# Patient Record
Sex: FEMALE | Race: WHITE | HISPANIC OR LATINO | Employment: OTHER | URBAN - NONMETROPOLITAN AREA
[De-identification: names, ages, dates, MRNs, and addresses within clinical notes are randomized per-mention and may not be internally consistent; named-entity substitution may affect disease eponyms.]

---

## 2022-07-28 ENCOUNTER — APPOINTMENT (OUTPATIENT)
Dept: GENERAL RADIOLOGY | Facility: HOSPITAL | Age: 82
End: 2022-07-28

## 2022-07-28 ENCOUNTER — HOSPITAL ENCOUNTER (EMERGENCY)
Facility: HOSPITAL | Age: 82
Discharge: HOME | End: 2022-07-28
Attending: EMERGENCY MEDICINE | Admitting: EMERGENCY MEDICINE

## 2022-07-28 ENCOUNTER — APPOINTMENT (OUTPATIENT)
Dept: CT IMAGING | Facility: HOSPITAL | Age: 82
End: 2022-07-28

## 2022-07-28 VITALS
SYSTOLIC BLOOD PRESSURE: 109 MMHG | TEMPERATURE: 98.3 F | HEIGHT: 60 IN | OXYGEN SATURATION: 95 % | DIASTOLIC BLOOD PRESSURE: 58 MMHG | HEART RATE: 65 BPM | WEIGHT: 110 LBS | BODY MASS INDEX: 21.6 KG/M2 | RESPIRATION RATE: 16 BRPM

## 2022-07-28 DIAGNOSIS — N39.0 URINARY TRACT INFECTION WITHOUT HEMATURIA, SITE UNSPECIFIED: Primary | ICD-10-CM

## 2022-07-28 LAB
ALBUMIN SERPL-MCNC: 3.3 G/DL (ref 3.5–5.2)
ALBUMIN/GLOB SERPL: 1.2 G/DL
ALP SERPL-CCNC: 59 U/L (ref 39–117)
ALT SERPL W P-5'-P-CCNC: 9 U/L (ref 1–33)
ANION GAP SERPL CALCULATED.3IONS-SCNC: 15.2 MMOL/L (ref 5–15)
AST SERPL-CCNC: 14 U/L (ref 1–32)
BACTERIA UR QL AUTO: ABNORMAL /HPF
BASOPHILS # BLD AUTO: 0.02 10*3/MM3 (ref 0–0.2)
BASOPHILS NFR BLD AUTO: 0.3 % (ref 0–1.5)
BILIRUB SERPL-MCNC: 0.3 MG/DL (ref 0–1.2)
BILIRUB UR QL STRIP: NEGATIVE
BUN SERPL-MCNC: 40 MG/DL (ref 8–23)
BUN/CREAT SERPL: 26.5 (ref 7–25)
CALCIUM SPEC-SCNC: 8.9 MG/DL (ref 8.6–10.5)
CHLORIDE SERPL-SCNC: 107 MMOL/L (ref 98–107)
CLARITY UR: ABNORMAL
CO2 SERPL-SCNC: 19.8 MMOL/L (ref 22–29)
COLOR UR: YELLOW
CREAT SERPL-MCNC: 1.51 MG/DL (ref 0.57–1)
D-LACTATE SERPL-SCNC: 1.9 MMOL/L (ref 0.5–2)
D-LACTATE SERPL-SCNC: 3.4 MMOL/L (ref 0.5–2)
DEPRECATED RDW RBC AUTO: 51.7 FL (ref 37–54)
EGFRCR SERPLBLD CKD-EPI 2021: 34.2 ML/MIN/1.73
EOSINOPHIL # BLD AUTO: 0.23 10*3/MM3 (ref 0–0.4)
EOSINOPHIL NFR BLD AUTO: 3.3 % (ref 0.3–6.2)
ERYTHROCYTE [DISTWIDTH] IN BLOOD BY AUTOMATED COUNT: 15.2 % (ref 12.3–15.4)
FLUAV RNA RESP QL NAA+PROBE: NOT DETECTED
FLUBV RNA RESP QL NAA+PROBE: NOT DETECTED
GLOBULIN UR ELPH-MCNC: 2.7 GM/DL
GLUCOSE BLDC GLUCOMTR-MCNC: 134 MG/DL (ref 70–130)
GLUCOSE SERPL-MCNC: 125 MG/DL (ref 65–99)
GLUCOSE UR STRIP-MCNC: NEGATIVE MG/DL
HCT VFR BLD AUTO: 30.5 % (ref 34–46.6)
HGB BLD-MCNC: 9.6 G/DL (ref 12–15.9)
HGB UR QL STRIP.AUTO: NEGATIVE
HOLD SPECIMEN: NORMAL
HOLD SPECIMEN: NORMAL
HYALINE CASTS UR QL AUTO: ABNORMAL /LPF
IMM GRANULOCYTES # BLD AUTO: 0.03 10*3/MM3 (ref 0–0.05)
IMM GRANULOCYTES NFR BLD AUTO: 0.4 % (ref 0–0.5)
KETONES UR QL STRIP: NEGATIVE
LEUKOCYTE ESTERASE UR QL STRIP.AUTO: ABNORMAL
LYMPHOCYTES # BLD AUTO: 1.23 10*3/MM3 (ref 0.7–3.1)
LYMPHOCYTES NFR BLD AUTO: 17.6 % (ref 19.6–45.3)
MAGNESIUM SERPL-MCNC: 1.8 MG/DL (ref 1.6–2.4)
MCH RBC QN AUTO: 29.1 PG (ref 26.6–33)
MCHC RBC AUTO-ENTMCNC: 31.5 G/DL (ref 31.5–35.7)
MCV RBC AUTO: 92.4 FL (ref 79–97)
MONOCYTES # BLD AUTO: 0.37 10*3/MM3 (ref 0.1–0.9)
MONOCYTES NFR BLD AUTO: 5.3 % (ref 5–12)
NEUTROPHILS NFR BLD AUTO: 5.1 10*3/MM3 (ref 1.7–7)
NEUTROPHILS NFR BLD AUTO: 73.1 % (ref 42.7–76)
NITRITE UR QL STRIP: NEGATIVE
NRBC BLD AUTO-RTO: 0 /100 WBC (ref 0–0.2)
PH UR STRIP.AUTO: 6 [PH] (ref 5–8)
PLATELET # BLD AUTO: 197 10*3/MM3 (ref 140–450)
PMV BLD AUTO: 10.6 FL (ref 6–12)
POTASSIUM SERPL-SCNC: 3.2 MMOL/L (ref 3.5–5.2)
PROCALCITONIN SERPL-MCNC: 0.06 NG/ML (ref 0–0.25)
PROT SERPL-MCNC: 6 G/DL (ref 6–8.5)
PROT UR QL STRIP: NEGATIVE
RBC # BLD AUTO: 3.3 10*6/MM3 (ref 3.77–5.28)
RBC # UR STRIP: ABNORMAL /HPF
REF LAB TEST METHOD: ABNORMAL
SARS-COV-2 RNA RESP QL NAA+PROBE: NOT DETECTED
SODIUM SERPL-SCNC: 142 MMOL/L (ref 136–145)
SP GR UR STRIP: 1.01 (ref 1–1.03)
SQUAMOUS #/AREA URNS HPF: ABNORMAL /HPF
TROPONIN T SERPL-MCNC: 0.02 NG/ML (ref 0–0.03)
UROBILINOGEN UR QL STRIP: ABNORMAL
WBC # UR STRIP: ABNORMAL /HPF
WBC NRBC COR # BLD: 6.98 10*3/MM3 (ref 3.4–10.8)
WHOLE BLOOD HOLD COAG: NORMAL
WHOLE BLOOD HOLD SPECIMEN: NORMAL

## 2022-07-28 PROCEDURE — 87186 SC STD MICRODIL/AGAR DIL: CPT | Performed by: EMERGENCY MEDICINE

## 2022-07-28 PROCEDURE — 85025 COMPLETE CBC W/AUTO DIFF WBC: CPT | Performed by: EMERGENCY MEDICINE

## 2022-07-28 PROCEDURE — 83735 ASSAY OF MAGNESIUM: CPT | Performed by: EMERGENCY MEDICINE

## 2022-07-28 PROCEDURE — 80053 COMPREHEN METABOLIC PANEL: CPT | Performed by: EMERGENCY MEDICINE

## 2022-07-28 PROCEDURE — 70450 CT HEAD/BRAIN W/O DYE: CPT

## 2022-07-28 PROCEDURE — C9803 HOPD COVID-19 SPEC COLLECT: HCPCS | Performed by: PHYSICIAN ASSISTANT

## 2022-07-28 PROCEDURE — 84484 ASSAY OF TROPONIN QUANT: CPT | Performed by: EMERGENCY MEDICINE

## 2022-07-28 PROCEDURE — 83605 ASSAY OF LACTIC ACID: CPT | Performed by: PHYSICIAN ASSISTANT

## 2022-07-28 PROCEDURE — 99285 EMERGENCY DEPT VISIT HI MDM: CPT

## 2022-07-28 PROCEDURE — 84145 PROCALCITONIN (PCT): CPT | Performed by: PHYSICIAN ASSISTANT

## 2022-07-28 PROCEDURE — 87077 CULTURE AEROBIC IDENTIFY: CPT | Performed by: EMERGENCY MEDICINE

## 2022-07-28 PROCEDURE — 87086 URINE CULTURE/COLONY COUNT: CPT | Performed by: EMERGENCY MEDICINE

## 2022-07-28 PROCEDURE — 82962 GLUCOSE BLOOD TEST: CPT

## 2022-07-28 PROCEDURE — 81001 URINALYSIS AUTO W/SCOPE: CPT | Performed by: EMERGENCY MEDICINE

## 2022-07-28 PROCEDURE — 25010000002 CEFTRIAXONE SODIUM-DEXTROSE 1-3.74 GM-%(50ML) RECONSTITUTED SOLUTION: Performed by: PHYSICIAN ASSISTANT

## 2022-07-28 PROCEDURE — 93005 ELECTROCARDIOGRAM TRACING: CPT

## 2022-07-28 PROCEDURE — 71045 X-RAY EXAM CHEST 1 VIEW: CPT

## 2022-07-28 PROCEDURE — 96374 THER/PROPH/DIAG INJ IV PUSH: CPT

## 2022-07-28 PROCEDURE — 87636 SARSCOV2 & INF A&B AMP PRB: CPT | Performed by: PHYSICIAN ASSISTANT

## 2022-07-28 RX ORDER — AMLODIPINE BESYLATE 2.5 MG/1
2.5 TABLET ORAL DAILY
COMMUNITY

## 2022-07-28 RX ORDER — CEFTRIAXONE 1 G/50ML
1 INJECTION, SOLUTION INTRAVENOUS ONCE
Status: COMPLETED | OUTPATIENT
Start: 2022-07-28 | End: 2022-07-28

## 2022-07-28 RX ORDER — VALSARTAN AND HYDROCHLOROTHIAZIDE 320; 25 MG/1; MG/1
1 TABLET, FILM COATED ORAL DAILY
COMMUNITY

## 2022-07-28 RX ORDER — ONDANSETRON 4 MG/1
4 TABLET, ORALLY DISINTEGRATING ORAL EVERY 6 HOURS PRN
Qty: 20 TABLET | Refills: 0 | Status: SHIPPED | OUTPATIENT
Start: 2022-07-28

## 2022-07-28 RX ORDER — CEPHALEXIN 500 MG/1
500 CAPSULE ORAL 3 TIMES DAILY
Qty: 21 CAPSULE | Refills: 0 | Status: SHIPPED | OUTPATIENT
Start: 2022-07-28 | End: 2022-08-04

## 2022-07-28 RX ORDER — SODIUM CHLORIDE 0.9 % (FLUSH) 0.9 %
10 SYRINGE (ML) INJECTION AS NEEDED
Status: DISCONTINUED | OUTPATIENT
Start: 2022-07-28 | End: 2022-07-28 | Stop reason: HOSPADM

## 2022-07-28 RX ORDER — QUETIAPINE FUMARATE 25 MG/1
25 TABLET, FILM COATED ORAL
COMMUNITY

## 2022-07-28 RX ORDER — ATORVASTATIN CALCIUM 40 MG/1
40 TABLET, FILM COATED ORAL DAILY
COMMUNITY

## 2022-07-28 RX ORDER — ASPIRIN 81 MG/1
81 TABLET, CHEWABLE ORAL DAILY
COMMUNITY

## 2022-07-28 RX ADMIN — SODIUM CHLORIDE 1000 ML: 9 INJECTION, SOLUTION INTRAVENOUS at 13:30

## 2022-07-28 RX ADMIN — CEFTRIAXONE 1 G: 1 INJECTION, SOLUTION INTRAVENOUS at 18:07

## 2022-07-30 LAB — BACTERIA SPEC AEROBE CULT: ABNORMAL

## 2022-09-29 ENCOUNTER — HOSPITAL ENCOUNTER (EMERGENCY)
Facility: HOSPITAL | Age: 82
Discharge: HOME | End: 2022-09-29
Attending: FAMILY MEDICINE | Admitting: FAMILY MEDICINE

## 2022-09-29 ENCOUNTER — APPOINTMENT (OUTPATIENT)
Dept: GENERAL RADIOLOGY | Facility: HOSPITAL | Age: 82
End: 2022-09-29

## 2022-09-29 VITALS
HEART RATE: 102 BPM | DIASTOLIC BLOOD PRESSURE: 97 MMHG | RESPIRATION RATE: 20 BRPM | WEIGHT: 90 LBS | OXYGEN SATURATION: 94 % | SYSTOLIC BLOOD PRESSURE: 152 MMHG | TEMPERATURE: 99.4 F

## 2022-09-29 DIAGNOSIS — U07.1 COVID-19 VIRUS INFECTION: Primary | ICD-10-CM

## 2022-09-29 LAB
B PARAPERT DNA SPEC QL NAA+PROBE: NOT DETECTED
B PERT DNA SPEC QL NAA+PROBE: NOT DETECTED
C PNEUM DNA NPH QL NAA+NON-PROBE: NOT DETECTED
FLUAV SUBTYP SPEC NAA+PROBE: NOT DETECTED
FLUBV RNA ISLT QL NAA+PROBE: NOT DETECTED
HADV DNA SPEC NAA+PROBE: NOT DETECTED
HCOV 229E RNA SPEC QL NAA+PROBE: NOT DETECTED
HCOV HKU1 RNA SPEC QL NAA+PROBE: NOT DETECTED
HCOV NL63 RNA SPEC QL NAA+PROBE: NOT DETECTED
HCOV OC43 RNA SPEC QL NAA+PROBE: NOT DETECTED
HMPV RNA NPH QL NAA+NON-PROBE: NOT DETECTED
HPIV1 RNA ISLT QL NAA+PROBE: NOT DETECTED
HPIV2 RNA SPEC QL NAA+PROBE: NOT DETECTED
HPIV3 RNA NPH QL NAA+PROBE: NOT DETECTED
HPIV4 P GENE NPH QL NAA+PROBE: NOT DETECTED
M PNEUMO IGG SER IA-ACNC: NOT DETECTED
RHINOVIRUS RNA SPEC NAA+PROBE: NOT DETECTED
RSV RNA NPH QL NAA+NON-PROBE: NOT DETECTED
SARS-COV-2 RNA NPH QL NAA+NON-PROBE: DETECTED

## 2022-09-29 PROCEDURE — 71045 X-RAY EXAM CHEST 1 VIEW: CPT

## 2022-09-29 PROCEDURE — 99283 EMERGENCY DEPT VISIT LOW MDM: CPT

## 2022-09-29 PROCEDURE — 0202U NFCT DS 22 TRGT SARS-COV-2: CPT | Performed by: FAMILY MEDICINE

## 2022-09-29 RX ORDER — ACETAMINOPHEN 500 MG
500 TABLET ORAL ONCE
Status: COMPLETED | OUTPATIENT
Start: 2022-09-29 | End: 2022-09-29

## 2022-09-29 RX ORDER — IBUPROFEN 200 MG
400 TABLET ORAL ONCE
Status: COMPLETED | OUTPATIENT
Start: 2022-09-29 | End: 2022-09-29

## 2022-09-29 RX ORDER — VALSARTAN AND HYDROCHLOROTHIAZIDE 160; 12.5 MG/1; MG/1
1 TABLET, FILM COATED ORAL DAILY
COMMUNITY

## 2022-09-29 RX ORDER — AMLODIPINE BESYLATE AND BENAZEPRIL HYDROCHLORIDE 2.5; 1 MG/1; MG/1
1 CAPSULE ORAL DAILY
COMMUNITY

## 2022-09-29 RX ORDER — ACETAMINOPHEN 325 MG/1
650 TABLET ORAL EVERY 6 HOURS PRN
Status: DISCONTINUED | OUTPATIENT
Start: 2022-09-29 | End: 2022-09-30 | Stop reason: HOSPADM

## 2022-09-29 RX ORDER — ATORVASTATIN CALCIUM 40 MG/1
40 TABLET, FILM COATED ORAL DAILY
COMMUNITY

## 2022-09-29 RX ORDER — QUETIAPINE FUMARATE 25 MG/1
25 TABLET, FILM COATED ORAL NIGHTLY
COMMUNITY

## 2022-09-29 RX ORDER — IBUPROFEN 600 MG/1
600 TABLET ORAL 3 TIMES DAILY
Qty: 30 TABLET | Refills: 0 | Status: SHIPPED | OUTPATIENT
Start: 2022-09-29

## 2022-09-29 RX ORDER — ACETAMINOPHEN 500 MG
500 TABLET ORAL EVERY 6 HOURS PRN
Qty: 30 TABLET | Refills: 0 | Status: SHIPPED | OUTPATIENT
Start: 2022-09-29

## 2022-09-29 RX ADMIN — ACETAMINOPHEN 500 MG: 500 TABLET, FILM COATED ORAL at 21:57

## 2022-09-29 RX ADMIN — ACETAMINOPHEN 650 MG: 325 TABLET, FILM COATED ORAL at 20:01

## 2022-09-29 RX ADMIN — IBUPROFEN 400 MG: 200 TABLET, FILM COATED ORAL at 21:57

## 2024-01-01 ENCOUNTER — APPOINTMENT (OUTPATIENT)
Dept: CT IMAGING | Facility: HOSPITAL | Age: 84
DRG: 207 | End: 2024-01-01
Payer: COMMERCIAL

## 2024-01-01 ENCOUNTER — APPOINTMENT (OUTPATIENT)
Dept: GENERAL RADIOLOGY | Facility: HOSPITAL | Age: 84
DRG: 207 | End: 2024-01-01
Payer: COMMERCIAL

## 2024-01-01 ENCOUNTER — HOSPITAL ENCOUNTER (INPATIENT)
Facility: HOSPITAL | Age: 84
LOS: 8 days | DRG: 207 | End: 2024-10-02
Attending: EMERGENCY MEDICINE | Admitting: INTERNAL MEDICINE
Payer: COMMERCIAL

## 2024-01-01 ENCOUNTER — APPOINTMENT (OUTPATIENT)
Dept: SLEEP MEDICINE | Facility: HOSPITAL | Age: 84
DRG: 207 | End: 2024-01-01
Payer: COMMERCIAL

## 2024-01-01 VITALS
OXYGEN SATURATION: 61 % | DIASTOLIC BLOOD PRESSURE: 73 MMHG | HEIGHT: 55 IN | WEIGHT: 112.43 LBS | BODY MASS INDEX: 26.02 KG/M2 | SYSTOLIC BLOOD PRESSURE: 152 MMHG | RESPIRATION RATE: 30 BRPM | TEMPERATURE: 100.8 F

## 2024-01-01 DIAGNOSIS — R09.2 RESPIRATORY ARREST: Primary | ICD-10-CM

## 2024-01-01 DIAGNOSIS — T17.308A CHOKING, INITIAL ENCOUNTER: ICD-10-CM

## 2024-01-01 LAB
A-A DO2: 198 MMHG
A-A DO2: 499.2 MMHG
A-A DO2: 52.8 MMHG
A-A DO2: 82.7 MMHG
ABO GROUP BLD: NORMAL
ABO GROUP BLD: NORMAL
ACANTHOCYTES BLD QL SMEAR: ABNORMAL
ALBUMIN SERPL-MCNC: 2.4 G/DL (ref 3.5–5.2)
ALBUMIN SERPL-MCNC: 2.6 G/DL (ref 3.5–5.2)
ALBUMIN SERPL-MCNC: 3 G/DL (ref 3.5–5.2)
ALBUMIN SERPL-MCNC: 3.6 G/DL (ref 3.5–5.2)
ALBUMIN SERPL-MCNC: 3.6 G/DL (ref 3.5–5.2)
ALBUMIN/GLOB SERPL: 0.9 G/DL
ALBUMIN/GLOB SERPL: 1 G/DL
ALBUMIN/GLOB SERPL: 1 G/DL
ALBUMIN/GLOB SERPL: 1.1 G/DL
ALBUMIN/GLOB SERPL: 1.2 G/DL
ALP SERPL-CCNC: 115 U/L (ref 39–117)
ALP SERPL-CCNC: 135 U/L (ref 39–117)
ALP SERPL-CCNC: 155 U/L (ref 39–117)
ALP SERPL-CCNC: 231 U/L (ref 39–117)
ALP SERPL-CCNC: 86 U/L (ref 39–117)
ALT SERPL W P-5'-P-CCNC: 150 U/L (ref 1–33)
ALT SERPL W P-5'-P-CCNC: 152 U/L (ref 1–33)
ALT SERPL W P-5'-P-CCNC: 235 U/L (ref 1–33)
ALT SERPL W P-5'-P-CCNC: 71 U/L (ref 1–33)
ALT SERPL W P-5'-P-CCNC: 94 U/L (ref 1–33)
AMORPH URATE CRY URNS QL MICRO: ABNORMAL /HPF
ANION GAP SERPL CALCULATED.3IONS-SCNC: 10.5 MMOL/L (ref 5–15)
ANION GAP SERPL CALCULATED.3IONS-SCNC: 10.9 MMOL/L (ref 5–15)
ANION GAP SERPL CALCULATED.3IONS-SCNC: 11.7 MMOL/L (ref 5–15)
ANION GAP SERPL CALCULATED.3IONS-SCNC: 15.2 MMOL/L (ref 5–15)
ANION GAP SERPL CALCULATED.3IONS-SCNC: 25.2 MMOL/L (ref 5–15)
ANION GAP SERPL CALCULATED.3IONS-SCNC: 6.6 MMOL/L (ref 5–15)
APTT PPP: 38.3 SECONDS (ref 23–35)
ARTERIAL PATENCY WRIST A: ABNORMAL
ARTERIAL PATENCY WRIST A: POSITIVE
AST SERPL-CCNC: 132 U/L (ref 1–32)
AST SERPL-CCNC: 179 U/L (ref 1–32)
AST SERPL-CCNC: 197 U/L (ref 1–32)
AST SERPL-CCNC: 202 U/L (ref 1–32)
AST SERPL-CCNC: 339 U/L (ref 1–32)
ATMOSPHERIC PRESS: 723 MMHG
ATMOSPHERIC PRESS: 729 MMHG
ATMOSPHERIC PRESS: 730 MMHG
ATMOSPHERIC PRESS: 730 MMHG
BACTERIA SPEC AEROBE CULT: ABNORMAL
BACTERIA SPEC AEROBE CULT: NORMAL
BACTERIA SPEC AEROBE CULT: NORMAL
BACTERIA UR QL AUTO: ABNORMAL /HPF
BASE EXCESS BLDA CALC-SCNC: -25.3 MMOL/L (ref 0–2)
BASE EXCESS BLDA CALC-SCNC: -6.1 MMOL/L (ref 0–2)
BASE EXCESS BLDA CALC-SCNC: 0.3 MMOL/L (ref 0–2)
BASE EXCESS BLDA CALC-SCNC: 8.2 MMOL/L (ref 0–2)
BASOPHILS # BLD AUTO: 0.02 10*3/MM3 (ref 0–0.2)
BASOPHILS # BLD AUTO: 0.03 10*3/MM3 (ref 0–0.2)
BASOPHILS NFR BLD AUTO: 0.1 % (ref 0–1.5)
BASOPHILS NFR BLD AUTO: 0.3 % (ref 0–1.5)
BDY SITE: ABNORMAL
BH BB BLOOD EXPIRATION DATE: NORMAL
BH BB BLOOD TYPE BARCODE: 6200
BH BB DISPENSE STATUS: NORMAL
BH BB PRODUCT CODE: NORMAL
BH BB UNIT NUMBER: NORMAL
BILIRUB CONJ SERPL-MCNC: <0.2 MG/DL (ref 0–0.3)
BILIRUB SERPL-MCNC: 0.2 MG/DL (ref 0–1.2)
BILIRUB SERPL-MCNC: 0.3 MG/DL (ref 0–1.2)
BILIRUB SERPL-MCNC: 0.3 MG/DL (ref 0–1.2)
BILIRUB SERPL-MCNC: 0.4 MG/DL (ref 0–1.2)
BILIRUB SERPL-MCNC: 0.4 MG/DL (ref 0–1.2)
BILIRUB UR QL STRIP: NEGATIVE
BLD GP AB SCN SERPL QL: NEGATIVE
BUN SERPL-MCNC: 12 MG/DL (ref 8–23)
BUN SERPL-MCNC: 16 MG/DL (ref 8–23)
BUN SERPL-MCNC: 23 MG/DL (ref 8–23)
BUN SERPL-MCNC: 28 MG/DL (ref 8–23)
BUN SERPL-MCNC: 30 MG/DL (ref 8–23)
BUN SERPL-MCNC: 39 MG/DL (ref 8–23)
BUN/CREAT SERPL: 15 (ref 7–25)
BUN/CREAT SERPL: 16.5 (ref 7–25)
BUN/CREAT SERPL: 19.2 (ref 7–25)
BUN/CREAT SERPL: 19.8 (ref 7–25)
BUN/CREAT SERPL: 22.6 (ref 7–25)
BUN/CREAT SERPL: 31.7 (ref 7–25)
BURR CELLS BLD QL SMEAR: ABNORMAL
CALCIUM SPEC-SCNC: 7.3 MG/DL (ref 8.6–10.5)
CALCIUM SPEC-SCNC: 7.4 MG/DL (ref 8.6–10.5)
CALCIUM SPEC-SCNC: 7.7 MG/DL (ref 8.6–10.5)
CALCIUM SPEC-SCNC: 8 MG/DL (ref 8.6–10.5)
CALCIUM SPEC-SCNC: 9.4 MG/DL (ref 8.6–10.5)
CALCIUM SPEC-SCNC: 9.6 MG/DL (ref 8.6–10.5)
CHLORIDE SERPL-SCNC: 101 MMOL/L (ref 98–107)
CHLORIDE SERPL-SCNC: 103 MMOL/L (ref 98–107)
CHLORIDE SERPL-SCNC: 104 MMOL/L (ref 98–107)
CHLORIDE SERPL-SCNC: 104 MMOL/L (ref 98–107)
CHLORIDE SERPL-SCNC: 98 MMOL/L (ref 98–107)
CHLORIDE SERPL-SCNC: 98 MMOL/L (ref 98–107)
CLARITY UR: ABNORMAL
CO2 SERPL-SCNC: 13.8 MMOL/L (ref 22–29)
CO2 SERPL-SCNC: 20.5 MMOL/L (ref 22–29)
CO2 SERPL-SCNC: 22.4 MMOL/L (ref 22–29)
CO2 SERPL-SCNC: 24.1 MMOL/L (ref 22–29)
CO2 SERPL-SCNC: 30.3 MMOL/L (ref 22–29)
CO2 SERPL-SCNC: 30.8 MMOL/L (ref 22–29)
COHGB MFR BLD: 0.3 % (ref 0–2)
COHGB MFR BLD: 0.6 % (ref 0–2)
COHGB MFR BLD: 0.8 % (ref 0–2)
COHGB MFR BLD: <0 % (ref 0–2)
COLOR UR: YELLOW
CREAT SERPL-MCNC: 0.8 MG/DL (ref 0.57–1)
CREAT SERPL-MCNC: 0.97 MG/DL (ref 0.57–1)
CREAT SERPL-MCNC: 1.16 MG/DL (ref 0.57–1)
CREAT SERPL-MCNC: 1.23 MG/DL (ref 0.57–1)
CREAT SERPL-MCNC: 1.33 MG/DL (ref 0.57–1)
CREAT SERPL-MCNC: 1.46 MG/DL (ref 0.57–1)
CROSSMATCH INTERPRETATION: NORMAL
CRP SERPL-MCNC: 0.45 MG/DL (ref 0–0.5)
D-LACTATE SERPL-SCNC: 14.1 MMOL/L (ref 0.5–2)
D-LACTATE SERPL-SCNC: 3.1 MMOL/L (ref 0.5–2)
D-LACTATE SERPL-SCNC: 4.8 MMOL/L (ref 0.5–2)
D-LACTATE SERPL-SCNC: 8.9 MMOL/L (ref 0.5–2)
DEPRECATED RDW RBC AUTO: 49.2 FL (ref 37–54)
DEPRECATED RDW RBC AUTO: 53.1 FL (ref 37–54)
DEPRECATED RDW RBC AUTO: 53.2 FL (ref 37–54)
DEPRECATED RDW RBC AUTO: 54.8 FL (ref 37–54)
DEPRECATED RDW RBC AUTO: 55.5 FL (ref 37–54)
DEPRECATED RDW RBC AUTO: 62.4 FL (ref 37–54)
EGFRCR SERPLBLD CKD-EPI 2021: 34.9 ML/MIN/1.73
EGFRCR SERPLBLD CKD-EPI 2021: 39 ML/MIN/1.73
EGFRCR SERPLBLD CKD-EPI 2021: 42.9 ML/MIN/1.73
EGFRCR SERPLBLD CKD-EPI 2021: 46 ML/MIN/1.73
EGFRCR SERPLBLD CKD-EPI 2021: 57 ML/MIN/1.73
EGFRCR SERPLBLD CKD-EPI 2021: 71.9 ML/MIN/1.73
EOSINOPHIL # BLD AUTO: 0.03 10*3/MM3 (ref 0–0.4)
EOSINOPHIL # BLD AUTO: 0.05 10*3/MM3 (ref 0–0.4)
EOSINOPHIL # BLD MANUAL: 0.62 10*3/MM3 (ref 0–0.4)
EOSINOPHIL NFR BLD AUTO: 0.2 % (ref 0.3–6.2)
EOSINOPHIL NFR BLD AUTO: 0.5 % (ref 0.3–6.2)
EOSINOPHIL NFR BLD MANUAL: 5 % (ref 0.3–6.2)
ERYTHROCYTE [DISTWIDTH] IN BLOOD BY AUTOMATED COUNT: 16.4 % (ref 12.3–15.4)
ERYTHROCYTE [DISTWIDTH] IN BLOOD BY AUTOMATED COUNT: 17 % (ref 12.3–15.4)
ERYTHROCYTE [DISTWIDTH] IN BLOOD BY AUTOMATED COUNT: 17.5 % (ref 12.3–15.4)
ERYTHROCYTE [DISTWIDTH] IN BLOOD BY AUTOMATED COUNT: 17.6 % (ref 12.3–15.4)
ERYTHROCYTE [DISTWIDTH] IN BLOOD BY AUTOMATED COUNT: 17.9 % (ref 12.3–15.4)
ERYTHROCYTE [DISTWIDTH] IN BLOOD BY AUTOMATED COUNT: 18.2 % (ref 12.3–15.4)
GLOBULIN UR ELPH-MCNC: 2.3 GM/DL
GLOBULIN UR ELPH-MCNC: 2.8 GM/DL
GLOBULIN UR ELPH-MCNC: 2.9 GM/DL
GLOBULIN UR ELPH-MCNC: 3.1 GM/DL
GLOBULIN UR ELPH-MCNC: 3.4 GM/DL
GLUCOSE BLDC GLUCOMTR-MCNC: 112 MG/DL (ref 70–130)
GLUCOSE BLDC GLUCOMTR-MCNC: 134 MG/DL (ref 70–130)
GLUCOSE BLDC GLUCOMTR-MCNC: 160 MG/DL (ref 70–130)
GLUCOSE BLDC GLUCOMTR-MCNC: 164 MG/DL (ref 70–130)
GLUCOSE BLDC GLUCOMTR-MCNC: 164 MG/DL (ref 70–130)
GLUCOSE BLDC GLUCOMTR-MCNC: 166 MG/DL (ref 70–130)
GLUCOSE BLDC GLUCOMTR-MCNC: 166 MG/DL (ref 70–130)
GLUCOSE BLDC GLUCOMTR-MCNC: 186 MG/DL (ref 70–130)
GLUCOSE BLDC GLUCOMTR-MCNC: 190 MG/DL (ref 70–130)
GLUCOSE BLDC GLUCOMTR-MCNC: 211 MG/DL (ref 70–130)
GLUCOSE BLDC GLUCOMTR-MCNC: 213 MG/DL (ref 70–130)
GLUCOSE BLDC GLUCOMTR-MCNC: 243 MG/DL (ref 70–130)
GLUCOSE SERPL-MCNC: 155 MG/DL (ref 65–99)
GLUCOSE SERPL-MCNC: 158 MG/DL (ref 65–99)
GLUCOSE SERPL-MCNC: 182 MG/DL (ref 65–99)
GLUCOSE SERPL-MCNC: 190 MG/DL (ref 65–99)
GLUCOSE SERPL-MCNC: 215 MG/DL (ref 65–99)
GLUCOSE SERPL-MCNC: 240 MG/DL (ref 65–99)
GLUCOSE UR STRIP-MCNC: NEGATIVE MG/DL
HCO3 BLDA-SCNC: 18.6 MMOL/L (ref 22–28)
HCO3 BLDA-SCNC: 24.5 MMOL/L (ref 22–28)
HCO3 BLDA-SCNC: 31.8 MMOL/L (ref 22–28)
HCO3 BLDA-SCNC: 9.6 MMOL/L (ref 22–28)
HCT VFR BLD AUTO: 21.5 % (ref 34–46.6)
HCT VFR BLD AUTO: 23 % (ref 34–46.6)
HCT VFR BLD AUTO: 24.7 % (ref 34–46.6)
HCT VFR BLD AUTO: 26.9 % (ref 34–46.6)
HCT VFR BLD AUTO: 29.5 % (ref 34–46.6)
HCT VFR BLD AUTO: 33.3 % (ref 34–46.6)
HCT VFR BLD CALC: 21.4 %
HCT VFR BLD CALC: 24.7 %
HCT VFR BLD CALC: 29.6 %
HCT VFR BLD CALC: 30 %
HGB BLD-MCNC: 6.8 G/DL (ref 12–15.9)
HGB BLD-MCNC: 7.2 G/DL (ref 12–15.9)
HGB BLD-MCNC: 7.8 G/DL (ref 12–15.9)
HGB BLD-MCNC: 8.7 G/DL (ref 12–15.9)
HGB BLD-MCNC: 9.5 G/DL (ref 12–15.9)
HGB BLD-MCNC: 9.6 G/DL (ref 12–15.9)
HGB UR QL STRIP.AUTO: ABNORMAL
HOLD SPECIMEN: NORMAL
HOLD SPECIMEN: NORMAL
HYALINE CASTS UR QL AUTO: ABNORMAL /LPF
HYPOCHROMIA BLD QL: ABNORMAL
IMM GRANULOCYTES # BLD AUTO: 0.07 10*3/MM3 (ref 0–0.05)
IMM GRANULOCYTES # BLD AUTO: 0.07 10*3/MM3 (ref 0–0.05)
IMM GRANULOCYTES NFR BLD AUTO: 0.5 % (ref 0–0.5)
IMM GRANULOCYTES NFR BLD AUTO: 0.6 % (ref 0–0.5)
INHALED O2 CONCENTRATION: 100 %
INHALED O2 CONCENTRATION: 100 %
INHALED O2 CONCENTRATION: 30 %
INHALED O2 CONCENTRATION: 35 %
INR PPP: 1.32 (ref 0.9–1.1)
KETONES UR QL STRIP: NEGATIVE
LEUKOCYTE ESTERASE UR QL STRIP.AUTO: ABNORMAL
LYMPHOCYTES # BLD AUTO: 1.57 10*3/MM3 (ref 0.7–3.1)
LYMPHOCYTES # BLD AUTO: 1.69 10*3/MM3 (ref 0.7–3.1)
LYMPHOCYTES # BLD MANUAL: 6.77 10*3/MM3 (ref 0.7–3.1)
LYMPHOCYTES NFR BLD AUTO: 11.1 % (ref 19.6–45.3)
LYMPHOCYTES NFR BLD AUTO: 14.5 % (ref 19.6–45.3)
LYMPHOCYTES NFR BLD MANUAL: 6 % (ref 5–12)
Lab: ABNORMAL
MAGNESIUM SERPL-MCNC: 1.7 MG/DL (ref 1.6–2.4)
MAGNESIUM SERPL-MCNC: 2.2 MG/DL (ref 1.6–2.4)
MAGNESIUM SERPL-MCNC: 3 MG/DL (ref 1.6–2.4)
MCH RBC QN AUTO: 26.4 PG (ref 26.6–33)
MCH RBC QN AUTO: 26.5 PG (ref 26.6–33)
MCH RBC QN AUTO: 26.6 PG (ref 26.6–33)
MCH RBC QN AUTO: 27.2 PG (ref 26.6–33)
MCHC RBC AUTO-ENTMCNC: 28.8 G/DL (ref 31.5–35.7)
MCHC RBC AUTO-ENTMCNC: 31.3 G/DL (ref 31.5–35.7)
MCHC RBC AUTO-ENTMCNC: 31.6 G/DL (ref 31.5–35.7)
MCHC RBC AUTO-ENTMCNC: 31.6 G/DL (ref 31.5–35.7)
MCHC RBC AUTO-ENTMCNC: 32.2 G/DL (ref 31.5–35.7)
MCHC RBC AUTO-ENTMCNC: 32.3 G/DL (ref 31.5–35.7)
MCV RBC AUTO: 81.5 FL (ref 79–97)
MCV RBC AUTO: 82.6 FL (ref 79–97)
MCV RBC AUTO: 84 FL (ref 79–97)
MCV RBC AUTO: 84 FL (ref 79–97)
MCV RBC AUTO: 84.9 FL (ref 79–97)
MCV RBC AUTO: 94.3 FL (ref 79–97)
METHGB BLD QL: 0.8 % (ref 0–1.5)
METHGB BLD QL: 0.9 % (ref 0–1.5)
METHGB BLD QL: 1.2 % (ref 0–1.5)
METHGB BLD QL: 1.2 % (ref 0–1.5)
MODALITY: ABNORMAL
MONOCYTES # BLD AUTO: 0.45 10*3/MM3 (ref 0.1–0.9)
MONOCYTES # BLD AUTO: 0.71 10*3/MM3 (ref 0.1–0.9)
MONOCYTES # BLD: 0.74 10*3/MM3 (ref 0.1–0.9)
MONOCYTES NFR BLD AUTO: 4.2 % (ref 5–12)
MONOCYTES NFR BLD AUTO: 4.7 % (ref 5–12)
MRSA DNA SPEC QL NAA+PROBE: NORMAL
NEUTROPHILS # BLD AUTO: 4.19 10*3/MM3 (ref 1.7–7)
NEUTROPHILS NFR BLD AUTO: 12.74 10*3/MM3 (ref 1.7–7)
NEUTROPHILS NFR BLD AUTO: 79.9 % (ref 42.7–76)
NEUTROPHILS NFR BLD AUTO: 8.65 10*3/MM3 (ref 1.7–7)
NEUTROPHILS NFR BLD AUTO: 83.4 % (ref 42.7–76)
NEUTROPHILS NFR BLD MANUAL: 33 % (ref 42.7–76)
NEUTS BAND NFR BLD MANUAL: 1 % (ref 0–5)
NITRITE UR QL STRIP: NEGATIVE
NOTIFIED BY: ABNORMAL
NOTIFIED WHO: ABNORMAL
NRBC BLD AUTO-RTO: 0 /100 WBC (ref 0–0.2)
NRBC BLD AUTO-RTO: 0 /100 WBC (ref 0–0.2)
OXYHGB MFR BLDV: 88.2 % (ref 94–99)
OXYHGB MFR BLDV: 96.6 % (ref 94–99)
OXYHGB MFR BLDV: 97.2 % (ref 94–99)
OXYHGB MFR BLDV: 98.1 % (ref 94–99)
PCO2 BLDA: 32.8 MM HG (ref 35–45)
PCO2 BLDA: 36.8 MM HG (ref 35–45)
PCO2 BLDA: 39.3 MM HG (ref 35–45)
PCO2 BLDA: 70.5 MM HG (ref 35–45)
PCO2 TEMP ADJ BLD: ABNORMAL MM[HG]
PEEP RESPIRATORY: 5 CM[H2O]
PH BLDA: 7.36 PH UNITS (ref 7.3–7.5)
PH BLDA: 7.43 PH UNITS (ref 7.3–7.5)
PH BLDA: 7.52 PH UNITS (ref 7.3–7.5)
PH BLDA: <6.767 PH UNITS (ref 7.3–7.5)
PH UR STRIP.AUTO: 6 [PH] (ref 5–8)
PH, TEMP CORRECTED: ABNORMAL
PHENYTOIN SERPL-MCNC: 10.2 MCG/ML (ref 10–20)
PHENYTOIN SERPL-MCNC: 12.6 MCG/ML (ref 10–20)
PHENYTOIN SERPL-MCNC: 17 MCG/ML (ref 10–20)
PHENYTOIN SERPL-MCNC: 19.4 MCG/ML (ref 10–20)
PHOSPHATE SERPL-MCNC: 1.7 MG/DL (ref 2.5–4.5)
PHOSPHATE SERPL-MCNC: 3.3 MG/DL (ref 2.5–4.5)
PHOSPHATE SERPL-MCNC: 8 MG/DL (ref 2.5–4.5)
PLATELET # BLD AUTO: 148 10*3/MM3 (ref 140–450)
PLATELET # BLD AUTO: 169 10*3/MM3 (ref 140–450)
PLATELET # BLD AUTO: 176 10*3/MM3 (ref 140–450)
PLATELET # BLD AUTO: 243 10*3/MM3 (ref 140–450)
PLATELET # BLD AUTO: 250 10*3/MM3 (ref 140–450)
PLATELET # BLD AUTO: 302 10*3/MM3 (ref 140–450)
PMV BLD AUTO: 10.2 FL (ref 6–12)
PMV BLD AUTO: 10.4 FL (ref 6–12)
PMV BLD AUTO: 10.4 FL (ref 6–12)
PMV BLD AUTO: 10.7 FL (ref 6–12)
PMV BLD AUTO: 10.8 FL (ref 6–12)
PMV BLD AUTO: 11.8 FL (ref 6–12)
PO2 BLDA: 109 MM HG (ref 75–100)
PO2 BLDA: 113 MM HG (ref 75–100)
PO2 BLDA: 113 MM HG (ref 75–100)
PO2 BLDA: 452 MM HG (ref 75–100)
PO2 TEMP ADJ BLD: ABNORMAL MM[HG]
POTASSIUM SERPL-SCNC: 3.1 MMOL/L (ref 3.5–5.2)
POTASSIUM SERPL-SCNC: 3.1 MMOL/L (ref 3.5–5.2)
POTASSIUM SERPL-SCNC: 3.3 MMOL/L (ref 3.5–5.2)
POTASSIUM SERPL-SCNC: 3.4 MMOL/L (ref 3.5–5.2)
POTASSIUM SERPL-SCNC: 3.5 MMOL/L (ref 3.5–5.2)
POTASSIUM SERPL-SCNC: 5.3 MMOL/L (ref 3.5–5.2)
PROT SERPL-MCNC: 4.7 G/DL (ref 6–8.5)
PROT SERPL-MCNC: 5.4 G/DL (ref 6–8.5)
PROT SERPL-MCNC: 5.9 G/DL (ref 6–8.5)
PROT SERPL-MCNC: 6.7 G/DL (ref 6–8.5)
PROT SERPL-MCNC: 7 G/DL (ref 6–8.5)
PROT UR QL STRIP: ABNORMAL
PROTHROMBIN TIME: 16.9 SECONDS (ref 12.3–15.1)
RBC # BLD AUTO: 2.56 10*6/MM3 (ref 3.77–5.28)
RBC # BLD AUTO: 2.71 10*6/MM3 (ref 3.77–5.28)
RBC # BLD AUTO: 2.94 10*6/MM3 (ref 3.77–5.28)
RBC # BLD AUTO: 3.3 10*6/MM3 (ref 3.77–5.28)
RBC # BLD AUTO: 3.53 10*6/MM3 (ref 3.77–5.28)
RBC # BLD AUTO: 3.57 10*6/MM3 (ref 3.77–5.28)
RBC # UR STRIP: ABNORMAL /HPF
REF LAB TEST METHOD: ABNORMAL
RH BLD: POSITIVE
RH BLD: POSITIVE
SAO2 % BLDCOA: 89.6 % (ref 94–100)
SAO2 % BLDCOA: 98.5 % (ref 94–100)
SAO2 % BLDCOA: 98.6 % (ref 94–100)
SAO2 % BLDCOA: 98.7 % (ref 94–100)
SCAN SLIDE: NORMAL
SET MECH RESP RATE: 20
SMALL PLATELETS BLD QL SMEAR: ADEQUATE
SODIUM SERPL-SCNC: 129 MMOL/L (ref 136–145)
SODIUM SERPL-SCNC: 130 MMOL/L (ref 136–145)
SODIUM SERPL-SCNC: 133 MMOL/L (ref 136–145)
SODIUM SERPL-SCNC: 143 MMOL/L (ref 136–145)
SODIUM SERPL-SCNC: 145 MMOL/L (ref 136–145)
SODIUM SERPL-SCNC: 150 MMOL/L (ref 136–145)
SP GR UR STRIP: 1.02 (ref 1–1.03)
SQUAMOUS #/AREA URNS HPF: ABNORMAL /HPF
T&S EXPIRATION DATE: NORMAL
UNIT  ABO: NORMAL
UNIT  RH: NORMAL
UROBILINOGEN UR QL STRIP: ABNORMAL
VANCOMYCIN SERPL-MCNC: 10.9 MCG/ML (ref 5–40)
VANCOMYCIN SERPL-MCNC: 7.2 MCG/ML (ref 5–40)
VARIANT LYMPHS NFR BLD MANUAL: 15 % (ref 0–5)
VARIANT LYMPHS NFR BLD MANUAL: 40 % (ref 19.6–45.3)
VENTILATOR MODE: ABNORMAL
VENTILATOR MODE: AC
VT ON VENT VENT: 320 ML
WBC # UR STRIP: ABNORMAL /HPF
WBC MORPH BLD: NORMAL
WBC NRBC COR # BLD AUTO: 10.82 10*3/MM3 (ref 3.4–10.8)
WBC NRBC COR # BLD AUTO: 12.31 10*3/MM3 (ref 3.4–10.8)
WBC NRBC COR # BLD AUTO: 15.26 10*3/MM3 (ref 3.4–10.8)
WBC NRBC COR # BLD AUTO: 18.45 10*3/MM3 (ref 3.4–10.8)
WBC NRBC COR # BLD AUTO: 6.28 10*3/MM3 (ref 3.4–10.8)
WBC NRBC COR # BLD AUTO: 6.31 10*3/MM3 (ref 3.4–10.8)
WHOLE BLOOD HOLD COAG: NORMAL
WHOLE BLOOD HOLD SPECIMEN: NORMAL

## 2024-01-01 PROCEDURE — 74018 RADEX ABDOMEN 1 VIEW: CPT

## 2024-01-01 PROCEDURE — 83735 ASSAY OF MAGNESIUM: CPT | Performed by: INTERNAL MEDICINE

## 2024-01-01 PROCEDURE — 94761 N-INVAS EAR/PLS OXIMETRY MLT: CPT

## 2024-01-01 PROCEDURE — 94003 VENT MGMT INPAT SUBQ DAY: CPT

## 2024-01-01 PROCEDURE — 83735 ASSAY OF MAGNESIUM: CPT | Performed by: EMERGENCY MEDICINE

## 2024-01-01 PROCEDURE — 25010000002 LORAZEPAM PER 2 MG: Performed by: PHYSICIAN ASSISTANT

## 2024-01-01 PROCEDURE — 82805 BLOOD GASES W/O2 SATURATION: CPT

## 2024-01-01 PROCEDURE — 85027 COMPLETE CBC AUTOMATED: CPT | Performed by: INTERNAL MEDICINE

## 2024-01-01 PROCEDURE — 25010000002 PIPERACILLIN SOD-TAZOBACTAM PER 1 G: Performed by: INTERNAL MEDICINE

## 2024-01-01 PROCEDURE — 25010000002 VANCOMYCIN 1 G RECONSTITUTED SOLUTION 1 EACH VIAL: Performed by: INTERNAL MEDICINE

## 2024-01-01 PROCEDURE — 95816 EEG AWAKE AND DROWSY: CPT | Performed by: PSYCHIATRY & NEUROLOGY

## 2024-01-01 PROCEDURE — 25010000002 FOSPHENYTOIN 100 MG PE/2ML SOLUTION 2 ML VIAL: Performed by: PSYCHIATRY & NEUROLOGY

## 2024-01-01 PROCEDURE — 25010000002 ENOXAPARIN PER 10 MG: Performed by: INTERNAL MEDICINE

## 2024-01-01 PROCEDURE — 99231 SBSQ HOSP IP/OBS SF/LOW 25: CPT | Performed by: INTERNAL MEDICINE

## 2024-01-01 PROCEDURE — 94799 UNLISTED PULMONARY SVC/PX: CPT

## 2024-01-01 PROCEDURE — 82948 REAGENT STRIP/BLOOD GLUCOSE: CPT | Performed by: INTERNAL MEDICINE

## 2024-01-01 PROCEDURE — 99223 1ST HOSP IP/OBS HIGH 75: CPT | Performed by: INTERNAL MEDICINE

## 2024-01-01 PROCEDURE — 99232 SBSQ HOSP IP/OBS MODERATE 35: CPT | Performed by: PHYSICIAN ASSISTANT

## 2024-01-01 PROCEDURE — 83605 ASSAY OF LACTIC ACID: CPT | Performed by: EMERGENCY MEDICINE

## 2024-01-01 PROCEDURE — 95816 EEG AWAKE AND DROWSY: CPT

## 2024-01-01 PROCEDURE — 25010000002 MIDAZOLAM-SODIUM CHLORIDE 1 MG/ML 100ML NS 100-0.9 MG/100ML-% SOLUTION: Performed by: EMERGENCY MEDICINE

## 2024-01-01 PROCEDURE — 80185 ASSAY OF PHENYTOIN TOTAL: CPT | Performed by: PSYCHIATRY & NEUROLOGY

## 2024-01-01 PROCEDURE — 36600 WITHDRAWAL OF ARTERIAL BLOOD: CPT

## 2024-01-01 PROCEDURE — 86850 RBC ANTIBODY SCREEN: CPT | Performed by: INTERNAL MEDICINE

## 2024-01-01 PROCEDURE — 99233 SBSQ HOSP IP/OBS HIGH 50: CPT | Performed by: PHYSICIAN ASSISTANT

## 2024-01-01 PROCEDURE — 86900 BLOOD TYPING SEROLOGIC ABO: CPT | Performed by: INTERNAL MEDICINE

## 2024-01-01 PROCEDURE — 99233 SBSQ HOSP IP/OBS HIGH 50: CPT | Performed by: INTERNAL MEDICINE

## 2024-01-01 PROCEDURE — 82375 ASSAY CARBOXYHB QUANT: CPT

## 2024-01-01 PROCEDURE — 25810000003 DEXTROSE 5 % WITH KCL 20 MEQ 20 MEQ/L SOLUTION: Performed by: INTERNAL MEDICINE

## 2024-01-01 PROCEDURE — 80053 COMPREHEN METABOLIC PANEL: CPT | Performed by: INTERNAL MEDICINE

## 2024-01-01 PROCEDURE — 25010000002 MORPHINE PER 10 MG: Performed by: INTERNAL MEDICINE

## 2024-01-01 PROCEDURE — 25010000002 LORAZEPAM PER 2 MG

## 2024-01-01 PROCEDURE — 71045 X-RAY EXAM CHEST 1 VIEW: CPT

## 2024-01-01 PROCEDURE — 36415 COLL VENOUS BLD VENIPUNCTURE: CPT

## 2024-01-01 PROCEDURE — 25010000002 FOSPHENYTOIN 500 MG PE/10ML SOLUTION 10 ML VIAL: Performed by: PSYCHIATRY & NEUROLOGY

## 2024-01-01 PROCEDURE — 99291 CRITICAL CARE FIRST HOUR: CPT | Performed by: INTERNAL MEDICINE

## 2024-01-01 PROCEDURE — 5A1955Z RESPIRATORY VENTILATION, GREATER THAN 96 CONSECUTIVE HOURS: ICD-10-PCS | Performed by: INTERNAL MEDICINE

## 2024-01-01 PROCEDURE — 25510000001 IOPAMIDOL 61 % SOLUTION: Performed by: EMERGENCY MEDICINE

## 2024-01-01 PROCEDURE — 25010000002 GLYCOPYRROLATE PF 0.4 MG/2ML SOLUTION: Performed by: INTERNAL MEDICINE

## 2024-01-01 PROCEDURE — 25010000002 PROPOFOL 10 MG/ML EMULSION: Performed by: INTERNAL MEDICINE

## 2024-01-01 PROCEDURE — 84100 ASSAY OF PHOSPHORUS: CPT | Performed by: INTERNAL MEDICINE

## 2024-01-01 PROCEDURE — 85025 COMPLETE CBC W/AUTO DIFF WBC: CPT | Performed by: INTERNAL MEDICINE

## 2024-01-01 PROCEDURE — 87077 CULTURE AEROBIC IDENTIFY: CPT | Performed by: INTERNAL MEDICINE

## 2024-01-01 PROCEDURE — 87040 BLOOD CULTURE FOR BACTERIA: CPT | Performed by: EMERGENCY MEDICINE

## 2024-01-01 PROCEDURE — 85007 BL SMEAR W/DIFF WBC COUNT: CPT | Performed by: EMERGENCY MEDICINE

## 2024-01-01 PROCEDURE — 02H633Z INSERTION OF INFUSION DEVICE INTO RIGHT ATRIUM, PERCUTANEOUS APPROACH: ICD-10-PCS | Performed by: EMERGENCY MEDICINE

## 2024-01-01 PROCEDURE — 99223 1ST HOSP IP/OBS HIGH 75: CPT | Performed by: PSYCHIATRY & NEUROLOGY

## 2024-01-01 PROCEDURE — 82948 REAGENT STRIP/BLOOD GLUCOSE: CPT

## 2024-01-01 PROCEDURE — 70450 CT HEAD/BRAIN W/O DYE: CPT

## 2024-01-01 PROCEDURE — 94002 VENT MGMT INPAT INIT DAY: CPT

## 2024-01-01 PROCEDURE — 87186 SC STD MICRODIL/AGAR DIL: CPT | Performed by: INTERNAL MEDICINE

## 2024-01-01 PROCEDURE — 80053 COMPREHEN METABOLIC PANEL: CPT | Performed by: PSYCHIATRY & NEUROLOGY

## 2024-01-01 PROCEDURE — 25010000002 LEVETIRACETAM IN NACL 0.82% 500 MG/100ML SOLUTION: Performed by: PSYCHIATRY & NEUROLOGY

## 2024-01-01 PROCEDURE — 36430 TRANSFUSION BLD/BLD COMPNT: CPT

## 2024-01-01 PROCEDURE — 80202 ASSAY OF VANCOMYCIN: CPT

## 2024-01-01 PROCEDURE — 80048 BASIC METABOLIC PNL TOTAL CA: CPT | Performed by: INTERNAL MEDICINE

## 2024-01-01 PROCEDURE — 25010000002 LEVETIRACETAM IN NACL 0.75% 1000 MG/100ML SOLUTION: Performed by: PSYCHIATRY & NEUROLOGY

## 2024-01-01 PROCEDURE — 86920 COMPATIBILITY TEST SPIN: CPT

## 2024-01-01 PROCEDURE — C1751 CATH, INF, PER/CENT/MIDLINE: HCPCS

## 2024-01-01 PROCEDURE — 99291 CRITICAL CARE FIRST HOUR: CPT

## 2024-01-01 PROCEDURE — 83050 HGB METHEMOGLOBIN QUAN: CPT

## 2024-01-01 PROCEDURE — 25010000002 MAGNESIUM SULFATE PER 500 MG OF MAGNESIUM: Performed by: EMERGENCY MEDICINE

## 2024-01-01 PROCEDURE — 99232 SBSQ HOSP IP/OBS MODERATE 35: CPT | Performed by: PSYCHIATRY & NEUROLOGY

## 2024-01-01 PROCEDURE — 99221 1ST HOSP IP/OBS SF/LOW 40: CPT | Performed by: PHYSICIAN ASSISTANT

## 2024-01-01 PROCEDURE — 80202 ASSAY OF VANCOMYCIN: CPT | Performed by: INTERNAL MEDICINE

## 2024-01-01 PROCEDURE — 86901 BLOOD TYPING SEROLOGIC RH(D): CPT

## 2024-01-01 PROCEDURE — 85610 PROTHROMBIN TIME: CPT | Performed by: EMERGENCY MEDICINE

## 2024-01-01 PROCEDURE — 86140 C-REACTIVE PROTEIN: CPT | Performed by: EMERGENCY MEDICINE

## 2024-01-01 PROCEDURE — 87086 URINE CULTURE/COLONY COUNT: CPT | Performed by: INTERNAL MEDICINE

## 2024-01-01 PROCEDURE — 74177 CT ABD & PELVIS W/CONTRAST: CPT

## 2024-01-01 PROCEDURE — 86900 BLOOD TYPING SEROLOGIC ABO: CPT

## 2024-01-01 PROCEDURE — P9612 CATHETERIZE FOR URINE SPEC: HCPCS

## 2024-01-01 PROCEDURE — 85730 THROMBOPLASTIN TIME PARTIAL: CPT | Performed by: EMERGENCY MEDICINE

## 2024-01-01 PROCEDURE — 25810000003 SODIUM CHLORIDE 0.9 % SOLUTION 250 ML FLEX CONT: Performed by: INTERNAL MEDICINE

## 2024-01-01 PROCEDURE — 80053 COMPREHEN METABOLIC PANEL: CPT | Performed by: EMERGENCY MEDICINE

## 2024-01-01 PROCEDURE — 92950 HEART/LUNG RESUSCITATION CPR: CPT

## 2024-01-01 PROCEDURE — 25010000002 MIDAZOLAM PER 1MG

## 2024-01-01 PROCEDURE — 84100 ASSAY OF PHOSPHORUS: CPT | Performed by: EMERGENCY MEDICINE

## 2024-01-01 PROCEDURE — 25810000003 SODIUM CHLORIDE 0.9 % SOLUTION: Performed by: INTERNAL MEDICINE

## 2024-01-01 PROCEDURE — 81001 URINALYSIS AUTO W/SCOPE: CPT | Performed by: EMERGENCY MEDICINE

## 2024-01-01 PROCEDURE — 86901 BLOOD TYPING SEROLOGIC RH(D): CPT | Performed by: INTERNAL MEDICINE

## 2024-01-01 PROCEDURE — 82248 BILIRUBIN DIRECT: CPT | Performed by: PSYCHIATRY & NEUROLOGY

## 2024-01-01 PROCEDURE — 99239 HOSP IP/OBS DSCHRG MGMT >30: CPT | Performed by: INTERNAL MEDICINE

## 2024-01-01 PROCEDURE — 25010000002 EPINEPHRINE 1 MG/10ML SOLUTION PREFILLED SYRINGE: Performed by: EMERGENCY MEDICINE

## 2024-01-01 PROCEDURE — 25010000002 FENTANYL 10 MCG/1 ML NS: Performed by: INTERNAL MEDICINE

## 2024-01-01 PROCEDURE — P9016 RBC LEUKOCYTES REDUCED: HCPCS

## 2024-01-01 PROCEDURE — 25010000002 LEVETIRACETAM IN NACL 0.54% 1500 MG/100ML SOLUTION: Performed by: PSYCHIATRY & NEUROLOGY

## 2024-01-01 PROCEDURE — 93005 ELECTROCARDIOGRAM TRACING: CPT | Performed by: EMERGENCY MEDICINE

## 2024-01-01 PROCEDURE — 25010000002 MORPHINE SULFATE (PF) 2 MG/ML SOLUTION: Performed by: INTERNAL MEDICINE

## 2024-01-01 PROCEDURE — 71275 CT ANGIOGRAPHY CHEST: CPT

## 2024-01-01 PROCEDURE — 25010000002 MIDAZOLAM PER 1MG: Performed by: EMERGENCY MEDICINE

## 2024-01-01 PROCEDURE — 85025 COMPLETE CBC W/AUTO DIFF WBC: CPT | Performed by: EMERGENCY MEDICINE

## 2024-01-01 PROCEDURE — 87641 MR-STAPH DNA AMP PROBE: CPT | Performed by: INTERNAL MEDICINE

## 2024-01-01 RX ORDER — POLYETHYLENE GLYCOL 3350 17 G/17G
17 POWDER, FOR SOLUTION ORAL DAILY PRN
Status: DISCONTINUED | OUTPATIENT
Start: 2024-01-01 | End: 2024-01-01

## 2024-01-01 RX ORDER — SCOLOPAMINE TRANSDERMAL SYSTEM 1 MG/1
1 PATCH, EXTENDED RELEASE TRANSDERMAL
Status: DISCONTINUED | OUTPATIENT
Start: 2024-01-01 | End: 2024-01-01 | Stop reason: HOSPADM

## 2024-01-01 RX ORDER — DEXMEDETOMIDINE HYDROCHLORIDE 4 UG/ML
0.2 INJECTION, SOLUTION INTRAVENOUS
Status: DISCONTINUED | OUTPATIENT
Start: 2024-01-01 | End: 2024-01-01

## 2024-01-01 RX ORDER — SODIUM CHLORIDE 0.9 % (FLUSH) 0.9 %
10 SYRINGE (ML) INJECTION AS NEEDED
Status: DISCONTINUED | OUTPATIENT
Start: 2024-01-01 | End: 2024-01-01 | Stop reason: HOSPADM

## 2024-01-01 RX ORDER — MIDAZOLAM HYDROCHLORIDE 2 MG/2ML
2 INJECTION, SOLUTION INTRAMUSCULAR; INTRAVENOUS ONCE
Status: COMPLETED | OUTPATIENT
Start: 2024-01-01 | End: 2024-01-01

## 2024-01-01 RX ORDER — FOSPHENYTOIN SODIUM 50 MG/ML
1000 INJECTION, SOLUTION INTRAMUSCULAR; INTRAVENOUS ONCE
Status: DISCONTINUED | OUTPATIENT
Start: 2024-01-01 | End: 2024-01-01

## 2024-01-01 RX ORDER — NOREPINEPHRINE BITARTRATE/D5W 8 MG/250ML
.02-.3 PLASTIC BAG, INJECTION (ML) INTRAVENOUS
Status: DISCONTINUED | OUTPATIENT
Start: 2024-01-01 | End: 2024-01-01

## 2024-01-01 RX ORDER — ENOXAPARIN SODIUM 100 MG/ML
30 INJECTION SUBCUTANEOUS EVERY 24 HOURS
Status: DISCONTINUED | OUTPATIENT
Start: 2024-01-01 | End: 2024-01-01

## 2024-01-01 RX ORDER — ASPIRIN 81 MG/1
81 TABLET, CHEWABLE ORAL DAILY
COMMUNITY

## 2024-01-01 RX ORDER — SODIUM CHLORIDE 9 MG/ML
40 INJECTION, SOLUTION INTRAVENOUS AS NEEDED
Status: DISCONTINUED | OUTPATIENT
Start: 2024-01-01 | End: 2024-01-01

## 2024-01-01 RX ORDER — CHLORHEXIDINE GLUCONATE ORAL RINSE 1.2 MG/ML
15 SOLUTION DENTAL EVERY 12 HOURS SCHEDULED
Status: DISCONTINUED | OUTPATIENT
Start: 2024-01-01 | End: 2024-01-01 | Stop reason: HOSPADM

## 2024-01-01 RX ORDER — LEVETIRACETAM 5 MG/ML
500 INJECTION INTRAVASCULAR EVERY 12 HOURS SCHEDULED
Status: DISCONTINUED | OUTPATIENT
Start: 2024-01-01 | End: 2024-01-01

## 2024-01-01 RX ORDER — MAGNESIUM SULFATE HEPTAHYDRATE 500 MG/ML
INJECTION, SOLUTION INTRAMUSCULAR; INTRAVENOUS
Status: COMPLETED | OUTPATIENT
Start: 2024-01-01 | End: 2024-01-01

## 2024-01-01 RX ORDER — ACETAMINOPHEN 325 MG/1
650 TABLET ORAL EVERY 6 HOURS PRN
Status: DISCONTINUED | OUTPATIENT
Start: 2024-01-01 | End: 2024-01-01

## 2024-01-01 RX ORDER — MIDAZOLAM HYDROCHLORIDE 2 MG/2ML
INJECTION, SOLUTION INTRAMUSCULAR; INTRAVENOUS
Status: COMPLETED | OUTPATIENT
Start: 2024-01-01 | End: 2024-01-01

## 2024-01-01 RX ORDER — BISACODYL 10 MG
10 SUPPOSITORY, RECTAL RECTAL DAILY PRN
Status: DISCONTINUED | OUTPATIENT
Start: 2024-01-01 | End: 2024-01-01

## 2024-01-01 RX ORDER — MORPHINE SULFATE 2 MG/ML
2 INJECTION, SOLUTION INTRAMUSCULAR; INTRAVENOUS EVERY 4 HOURS PRN
Status: DISCONTINUED | OUTPATIENT
Start: 2024-01-01 | End: 2024-01-01

## 2024-01-01 RX ORDER — PANTOPRAZOLE SODIUM 40 MG/10ML
40 INJECTION, POWDER, LYOPHILIZED, FOR SOLUTION INTRAVENOUS
Status: DISCONTINUED | OUTPATIENT
Start: 2024-01-01 | End: 2024-01-01

## 2024-01-01 RX ORDER — MIDAZOLAM HYDROCHLORIDE 1 MG/ML
1-10 INJECTION, SOLUTION INTRAVENOUS
Status: DISCONTINUED | OUTPATIENT
Start: 2024-01-01 | End: 2024-01-01

## 2024-01-01 RX ORDER — QUETIAPINE FUMARATE 25 MG/1
25 TABLET, FILM COATED ORAL NIGHTLY
COMMUNITY

## 2024-01-01 RX ORDER — POTASSIUM CHLORIDE, DEXTROSE MONOHYDRATE 150; 5 MG/100ML; G/100ML
100 INJECTION, SOLUTION INTRAVENOUS CONTINUOUS
Status: DISCONTINUED | OUTPATIENT
Start: 2024-01-01 | End: 2024-01-01

## 2024-01-01 RX ORDER — CHLORHEXIDINE GLUCONATE ORAL RINSE 1.2 MG/ML
15 SOLUTION DENTAL EVERY 12 HOURS SCHEDULED
Status: DISCONTINUED | OUTPATIENT
Start: 2024-01-01 | End: 2024-01-01 | Stop reason: SDUPTHER

## 2024-01-01 RX ORDER — LORAZEPAM 2 MG/ML
INJECTION INTRAMUSCULAR
Status: COMPLETED
Start: 2024-01-01 | End: 2024-01-01

## 2024-01-01 RX ORDER — DEXMEDETOMIDINE HYDROCHLORIDE 4 UG/ML
0.5 INJECTION, SOLUTION INTRAVENOUS
Status: DISCONTINUED | OUTPATIENT
Start: 2024-01-01 | End: 2024-01-01

## 2024-01-01 RX ORDER — MORPHINE SULFATE 2 MG/ML
1 INJECTION, SOLUTION INTRAMUSCULAR; INTRAVENOUS
Status: DISCONTINUED | OUTPATIENT
Start: 2024-01-01 | End: 2024-01-01

## 2024-01-01 RX ORDER — BISACODYL 5 MG/1
5 TABLET, DELAYED RELEASE ORAL DAILY PRN
Status: DISCONTINUED | OUTPATIENT
Start: 2024-01-01 | End: 2024-01-01 | Stop reason: ALTCHOICE

## 2024-01-01 RX ORDER — ATORVASTATIN CALCIUM 40 MG/1
40 TABLET, FILM COATED ORAL DAILY
COMMUNITY

## 2024-01-01 RX ORDER — MORPHINE SULFATE 2 MG/ML
2 INJECTION, SOLUTION INTRAMUSCULAR; INTRAVENOUS
Status: DISCONTINUED | OUTPATIENT
Start: 2024-01-01 | End: 2024-01-01

## 2024-01-01 RX ORDER — AMOXICILLIN 250 MG
2 CAPSULE ORAL 2 TIMES DAILY
Status: DISCONTINUED | OUTPATIENT
Start: 2024-01-01 | End: 2024-01-01

## 2024-01-01 RX ORDER — IOPAMIDOL 612 MG/ML
100 INJECTION, SOLUTION INTRAVASCULAR
Status: COMPLETED | OUTPATIENT
Start: 2024-01-01 | End: 2024-01-01

## 2024-01-01 RX ORDER — SODIUM CHLORIDE 0.9 % (FLUSH) 0.9 %
10 SYRINGE (ML) INJECTION EVERY 12 HOURS SCHEDULED
Status: DISCONTINUED | OUTPATIENT
Start: 2024-01-01 | End: 2024-01-01 | Stop reason: HOSPADM

## 2024-01-01 RX ORDER — MIDAZOLAM HYDROCHLORIDE 2 MG/2ML
2 INJECTION, SOLUTION INTRAMUSCULAR; INTRAVENOUS EVERY 4 HOURS PRN
Status: DISCONTINUED | OUTPATIENT
Start: 2024-01-01 | End: 2024-01-01

## 2024-01-01 RX ORDER — LORAZEPAM 2 MG/ML
1 INJECTION INTRAMUSCULAR EVERY 4 HOURS PRN
Status: DISCONTINUED | OUTPATIENT
Start: 2024-01-01 | End: 2024-01-01

## 2024-01-01 RX ORDER — NITROGLYCERIN 0.4 MG/1
0.4 TABLET SUBLINGUAL
Status: DISCONTINUED | OUTPATIENT
Start: 2024-01-01 | End: 2024-01-01

## 2024-01-01 RX ORDER — LEVETIRACETAM 15 MG/ML
1500 INJECTION INTRAVASCULAR
Status: COMPLETED | OUTPATIENT
Start: 2024-01-01 | End: 2024-01-01

## 2024-01-01 RX ORDER — LEVETIRACETAM 10 MG/ML
1000 INJECTION INTRAVASCULAR EVERY 12 HOURS SCHEDULED
Status: DISCONTINUED | OUTPATIENT
Start: 2024-01-01 | End: 2024-01-01

## 2024-01-01 RX ORDER — CALCIUM CHLORIDE 100 MG/ML
INJECTION INTRAVENOUS; INTRAVENTRICULAR
Status: COMPLETED | OUTPATIENT
Start: 2024-01-01 | End: 2024-01-01

## 2024-01-01 RX ORDER — PANTOPRAZOLE SODIUM 40 MG/10ML
40 INJECTION, POWDER, LYOPHILIZED, FOR SOLUTION INTRAVENOUS
Status: DISCONTINUED | OUTPATIENT
Start: 2024-01-01 | End: 2024-01-01 | Stop reason: SDUPTHER

## 2024-01-01 RX ORDER — AMLODIPINE BESYLATE 2.5 MG/1
2.5 TABLET ORAL DAILY
COMMUNITY

## 2024-01-01 RX ORDER — FOSPHENYTOIN SODIUM 50 MG/ML
15 INJECTION, SOLUTION INTRAMUSCULAR; INTRAVENOUS ONCE
Status: DISCONTINUED | OUTPATIENT
Start: 2024-01-01 | End: 2024-01-01

## 2024-01-01 RX ORDER — SODIUM CHLORIDE 0.9 % (FLUSH) 0.9 %
10 SYRINGE (ML) INJECTION AS NEEDED
Status: DISCONTINUED | OUTPATIENT
Start: 2024-01-01 | End: 2024-01-01 | Stop reason: SDUPTHER

## 2024-01-01 RX ORDER — LORAZEPAM 2 MG/ML
1 INJECTION INTRAMUSCULAR
Status: DISCONTINUED | OUTPATIENT
Start: 2024-01-01 | End: 2024-01-01 | Stop reason: HOSPADM

## 2024-01-01 RX ORDER — MIDAZOLAM HYDROCHLORIDE 2 MG/2ML
INJECTION, SOLUTION INTRAMUSCULAR; INTRAVENOUS
Status: COMPLETED
Start: 2024-01-01 | End: 2024-01-01

## 2024-01-01 RX ORDER — LOSARTAN POTASSIUM 25 MG/1
25 TABLET ORAL DAILY
COMMUNITY

## 2024-01-01 RX ADMIN — CHLORHEXIDINE GLUCONATE 0.12% ORAL RINSE 15 ML: 1.2 LIQUID ORAL at 08:50

## 2024-01-01 RX ADMIN — ENOXAPARIN SODIUM 30 MG: 100 INJECTION SUBCUTANEOUS at 01:20

## 2024-01-01 RX ADMIN — Medication 10 ML: at 09:00

## 2024-01-01 RX ADMIN — LORAZEPAM 1 MG: 2 INJECTION INTRAMUSCULAR; INTRAVENOUS at 16:59

## 2024-01-01 RX ADMIN — Medication 10 ML: at 20:14

## 2024-01-01 RX ADMIN — FOSPHENYTOIN SODIUM 100 MG PE: 50 INJECTION, SOLUTION INTRAMUSCULAR; INTRAVENOUS at 01:20

## 2024-01-01 RX ADMIN — Medication 150 MEQ: at 18:22

## 2024-01-01 RX ADMIN — MORPHINE SULFATE 1 MG: 2 INJECTION, SOLUTION INTRAMUSCULAR; INTRAVENOUS at 14:07

## 2024-01-01 RX ADMIN — MORPHINE SULFATE 2 MG: 2 INJECTION, SOLUTION INTRAMUSCULAR; INTRAVENOUS at 13:11

## 2024-01-01 RX ADMIN — PIPERACILLIN AND TAZOBACTAM 4.5 G: 4; .5 INJECTION, POWDER, FOR SOLUTION INTRAVENOUS at 23:32

## 2024-01-01 RX ADMIN — Medication 50 MCG/HR: at 15:33

## 2024-01-01 RX ADMIN — CALCIUM CHLORIDE 1 G: 100 INJECTION, SOLUTION INTRAVENOUS; INTRAVENTRICULAR at 18:06

## 2024-01-01 RX ADMIN — CHLORHEXIDINE GLUCONATE 0.12% ORAL RINSE 15 ML: 1.2 LIQUID ORAL at 10:17

## 2024-01-01 RX ADMIN — LEVETIRACETAM 1500 MG: 15 INJECTION INTRAVENOUS at 10:12

## 2024-01-01 RX ADMIN — ACETAMINOPHEN 650 MG: 325 TABLET, FILM COATED ORAL at 04:05

## 2024-01-01 RX ADMIN — PIPERACILLIN AND TAZOBACTAM 4.5 G: 4; .5 INJECTION, POWDER, FOR SOLUTION INTRAVENOUS at 08:48

## 2024-01-01 RX ADMIN — MORPHINE SULFATE 1 MG: 2 INJECTION, SOLUTION INTRAMUSCULAR; INTRAVENOUS at 18:14

## 2024-01-01 RX ADMIN — SODIUM CHLORIDE 720 MG PE: 9 INJECTION, SOLUTION INTRAVENOUS at 10:15

## 2024-01-01 RX ADMIN — PROPOFOL 5 MCG/KG/MIN: 10 INJECTION, EMULSION INTRAVENOUS at 10:14

## 2024-01-01 RX ADMIN — LEVETIRACETAM 1000 MG: 10 INJECTION INTRAVASCULAR at 21:19

## 2024-01-01 RX ADMIN — CHLORHEXIDINE GLUCONATE 0.12% ORAL RINSE 15 ML: 1.2 LIQUID ORAL at 22:32

## 2024-01-01 RX ADMIN — LEVETIRACETAM 500 MG: 5 INJECTION, SOLUTION INTRAVENOUS at 08:49

## 2024-01-01 RX ADMIN — SENNOSIDES AND DOCUSATE SODIUM 2 TABLET: 50; 8.6 TABLET ORAL at 22:32

## 2024-01-01 RX ADMIN — CHLORHEXIDINE GLUCONATE 0.12% ORAL RINSE 15 ML: 1.2 LIQUID ORAL at 09:11

## 2024-01-01 RX ADMIN — MORPHINE SULFATE 2 MG: 2 INJECTION, SOLUTION INTRAMUSCULAR; INTRAVENOUS at 12:36

## 2024-01-01 RX ADMIN — MAGNESIUM SULFATE HEPTAHYDRATE 2 G: 500 INJECTION, SOLUTION INTRAMUSCULAR; INTRAVENOUS at 18:07

## 2024-01-01 RX ADMIN — MORPHINE SULFATE 2 MG: 2 INJECTION, SOLUTION INTRAMUSCULAR; INTRAVENOUS at 16:59

## 2024-01-01 RX ADMIN — CHLORHEXIDINE GLUCONATE 0.12% ORAL RINSE 15 ML: 1.2 LIQUID ORAL at 08:10

## 2024-01-01 RX ADMIN — GLYCOPYRROLATE 0.1 MG: 0.2 INJECTION, SOLUTION INTRAMUSCULAR; INTRAVENOUS at 16:53

## 2024-01-01 RX ADMIN — MORPHINE SULFATE 1 MG: 2 INJECTION, SOLUTION INTRAMUSCULAR; INTRAVENOUS at 13:15

## 2024-01-01 RX ADMIN — POTASSIUM CHLORIDE AND DEXTROSE MONOHYDRATE 100 ML/HR: 150; 5 INJECTION, SOLUTION INTRAVENOUS at 05:25

## 2024-01-01 RX ADMIN — Medication 10 ML: at 10:41

## 2024-01-01 RX ADMIN — FOSPHENYTOIN SODIUM 100 MG PE: 50 INJECTION, SOLUTION INTRAMUSCULAR; INTRAVENOUS at 12:05

## 2024-01-01 RX ADMIN — MORPHINE SULFATE 2 MG: 2 INJECTION, SOLUTION INTRAMUSCULAR; INTRAVENOUS at 06:51

## 2024-01-01 RX ADMIN — FOSPHENYTOIN SODIUM 100 MG PE: 50 INJECTION, SOLUTION INTRAMUSCULAR; INTRAVENOUS at 16:45

## 2024-01-01 RX ADMIN — VANCOMYCIN HYDROCHLORIDE 1000 MG: 1 INJECTION, POWDER, LYOPHILIZED, FOR SOLUTION INTRAVENOUS at 13:18

## 2024-01-01 RX ADMIN — MIDAZOLAM HYDROCHLORIDE 2 MG: 1 INJECTION, SOLUTION INTRAMUSCULAR; INTRAVENOUS at 18:14

## 2024-01-01 RX ADMIN — LORAZEPAM 1 MG: 2 INJECTION INTRAMUSCULAR; INTRAVENOUS at 13:16

## 2024-01-01 RX ADMIN — MORPHINE SULFATE 2 MG: 2 INJECTION, SOLUTION INTRAMUSCULAR; INTRAVENOUS at 21:56

## 2024-01-01 RX ADMIN — CHLORHEXIDINE GLUCONATE 0.12% ORAL RINSE 15 ML: 1.2 LIQUID ORAL at 10:25

## 2024-01-01 RX ADMIN — MIDAZOLAM HYDROCHLORIDE 2 MG: 1 INJECTION, SOLUTION INTRAMUSCULAR; INTRAVENOUS at 18:12

## 2024-01-01 RX ADMIN — PIPERACILLIN AND TAZOBACTAM 4.5 G: 4; .5 INJECTION, POWDER, FOR SOLUTION INTRAVENOUS at 02:20

## 2024-01-01 RX ADMIN — LORAZEPAM 1 MG: 2 INJECTION INTRAMUSCULAR; INTRAVENOUS at 21:56

## 2024-01-01 RX ADMIN — SODIUM BICARBONATE 50 MEQ: 84 INJECTION, SOLUTION INTRAVENOUS at 18:01

## 2024-01-01 RX ADMIN — LEVETIRACETAM 1000 MG: 10 INJECTION INTRAVASCULAR at 08:53

## 2024-01-01 RX ADMIN — DEXMEDETOMIDINE HYDROCHLORIDE 0.2 MCG/KG/HR: 400 INJECTION INTRAVENOUS at 09:48

## 2024-01-01 RX ADMIN — CHLORHEXIDINE GLUCONATE 0.12% ORAL RINSE 15 ML: 1.2 LIQUID ORAL at 22:04

## 2024-01-01 RX ADMIN — ENOXAPARIN SODIUM 30 MG: 100 INJECTION SUBCUTANEOUS at 01:47

## 2024-01-01 RX ADMIN — LORAZEPAM 1 MG: 2 INJECTION INTRAMUSCULAR; INTRAVENOUS at 15:41

## 2024-01-01 RX ADMIN — MORPHINE SULFATE 4 MG: 4 INJECTION, SOLUTION INTRAMUSCULAR; INTRAVENOUS at 13:53

## 2024-01-01 RX ADMIN — GLYCOPYRROLATE 0.1 MG: 0.2 INJECTION, SOLUTION INTRAMUSCULAR; INTRAVENOUS at 19:02

## 2024-01-01 RX ADMIN — PIPERACILLIN AND TAZOBACTAM 4.5 G: 4; .5 INJECTION, POWDER, FOR SOLUTION INTRAVENOUS at 17:19

## 2024-01-01 RX ADMIN — CHLORHEXIDINE GLUCONATE 0.12% ORAL RINSE 15 ML: 1.2 LIQUID ORAL at 08:34

## 2024-01-01 RX ADMIN — PANTOPRAZOLE SODIUM 40 MG: 40 INJECTION, POWDER, FOR SOLUTION INTRAVENOUS at 08:34

## 2024-01-01 RX ADMIN — GLYCOPYRROLATE 0.1 MG: 0.2 INJECTION, SOLUTION INTRAMUSCULAR; INTRAVENOUS at 16:57

## 2024-01-01 RX ADMIN — LEVETIRACETAM 500 MG: 5 INJECTION, SOLUTION INTRAVENOUS at 22:04

## 2024-01-01 RX ADMIN — DEXMEDETOMIDINE HYDROCHLORIDE 0.5 MCG/KG/HR: 400 INJECTION INTRAVENOUS at 18:18

## 2024-01-01 RX ADMIN — PIPERACILLIN AND TAZOBACTAM 4.5 G: 4; .5 INJECTION, POWDER, FOR SOLUTION INTRAVENOUS at 01:34

## 2024-01-01 RX ADMIN — PANTOPRAZOLE SODIUM 40 MG: 40 INJECTION, POWDER, FOR SOLUTION INTRAVENOUS at 08:50

## 2024-01-01 RX ADMIN — Medication 10 ML: at 00:13

## 2024-01-01 RX ADMIN — MORPHINE SULFATE 4 MG: 4 INJECTION, SOLUTION INTRAMUSCULAR; INTRAVENOUS at 14:30

## 2024-01-01 RX ADMIN — PIPERACILLIN AND TAZOBACTAM 4.5 G: 4; .5 INJECTION, POWDER, FOR SOLUTION INTRAVENOUS at 16:32

## 2024-01-01 RX ADMIN — MORPHINE SULFATE 2 MG: 2 INJECTION, SOLUTION INTRAMUSCULAR; INTRAVENOUS at 10:40

## 2024-01-01 RX ADMIN — Medication 10 ML: at 09:36

## 2024-01-01 RX ADMIN — SENNOSIDES AND DOCUSATE SODIUM 2 TABLET: 50; 8.6 TABLET ORAL at 08:50

## 2024-01-01 RX ADMIN — LORAZEPAM 1 MG: 2 INJECTION INTRAMUSCULAR at 16:59

## 2024-01-01 RX ADMIN — POTASSIUM CHLORIDE AND DEXTROSE MONOHYDRATE 100 ML/HR: 150; 5 INJECTION, SOLUTION INTRAVENOUS at 08:49

## 2024-01-01 RX ADMIN — Medication 10 ML: at 22:32

## 2024-01-01 RX ADMIN — DEXMEDETOMIDINE HYDROCHLORIDE 0.6 MCG/KG/HR: 400 INJECTION INTRAVENOUS at 01:34

## 2024-01-01 RX ADMIN — PIPERACILLIN AND TAZOBACTAM 4.5 G: 4; .5 INJECTION, POWDER, FOR SOLUTION INTRAVENOUS at 15:55

## 2024-01-01 RX ADMIN — PROPOFOL 20 MCG/KG/MIN: 10 INJECTION, EMULSION INTRAVENOUS at 22:02

## 2024-01-01 RX ADMIN — IOPAMIDOL 100 ML: 612 INJECTION, SOLUTION INTRAVENOUS at 20:33

## 2024-01-01 RX ADMIN — FOSPHENYTOIN SODIUM 100 MG PE: 50 INJECTION, SOLUTION INTRAMUSCULAR; INTRAVENOUS at 02:20

## 2024-01-01 RX ADMIN — Medication 10 ML: at 08:50

## 2024-01-01 RX ADMIN — CHLORHEXIDINE GLUCONATE 0.12% ORAL RINSE 15 ML: 1.2 LIQUID ORAL at 00:13

## 2024-01-01 RX ADMIN — MIDAZOLAM HYDROCHLORIDE 2 MG: 1 INJECTION, SOLUTION INTRAMUSCULAR; INTRAVENOUS at 19:50

## 2024-01-01 RX ADMIN — ACETAMINOPHEN 650 MG: 325 TABLET, FILM COATED ORAL at 16:45

## 2024-01-01 RX ADMIN — NOREPINEPHRINE BITARTRATE 0.02 MCG/KG/MIN: 8 INJECTION, SOLUTION INTRAVENOUS at 13:23

## 2024-01-01 RX ADMIN — PIPERACILLIN AND TAZOBACTAM 4.5 G: 4; .5 INJECTION, POWDER, FOR SOLUTION INTRAVENOUS at 08:33

## 2024-01-01 RX ADMIN — Medication 10 ML: at 08:34

## 2024-01-01 RX ADMIN — GLYCOPYRROLATE 0.1 MG: 0.2 INJECTION, SOLUTION INTRAMUSCULAR; INTRAVENOUS at 12:39

## 2024-01-01 RX ADMIN — MORPHINE SULFATE 2 MG: 2 INJECTION, SOLUTION INTRAMUSCULAR; INTRAVENOUS at 12:55

## 2024-01-01 RX ADMIN — POTASSIUM CHLORIDE AND DEXTROSE MONOHYDRATE 100 ML/HR: 150; 5 INJECTION, SOLUTION INTRAVENOUS at 08:48

## 2024-01-01 RX ADMIN — MIDAZOLAM HYDROCHLORIDE 2 MG/HR: 1 INJECTION, SOLUTION INTRAVENOUS at 21:09

## 2024-01-01 RX ADMIN — LORAZEPAM 1 MG: 2 INJECTION INTRAMUSCULAR; INTRAVENOUS at 14:30

## 2024-01-01 RX ADMIN — SODIUM BICARBONATE 50 MEQ: 84 INJECTION, SOLUTION INTRAVENOUS at 18:12

## 2024-01-01 RX ADMIN — POTASSIUM CHLORIDE AND DEXTROSE MONOHYDRATE 100 ML/HR: 150; 5 INJECTION, SOLUTION INTRAVENOUS at 11:20

## 2024-01-01 RX ADMIN — MIDAZOLAM HYDROCHLORIDE 2 MG: 2 INJECTION, SOLUTION INTRAMUSCULAR; INTRAVENOUS at 18:14

## 2024-01-01 RX ADMIN — CHLORHEXIDINE GLUCONATE 0.12% ORAL RINSE 15 ML: 1.2 LIQUID ORAL at 21:18

## 2024-01-01 RX ADMIN — MORPHINE SULFATE 1 MG: 2 INJECTION, SOLUTION INTRAMUSCULAR; INTRAVENOUS at 16:57

## 2024-01-01 RX ADMIN — ENOXAPARIN SODIUM 30 MG: 100 INJECTION SUBCUTANEOUS at 04:04

## 2024-01-01 RX ADMIN — SENNOSIDES AND DOCUSATE SODIUM 2 TABLET: 50; 8.6 TABLET ORAL at 08:09

## 2024-01-01 RX ADMIN — PANTOPRAZOLE SODIUM 40 MG: 40 INJECTION, POWDER, FOR SOLUTION INTRAVENOUS at 08:49

## 2024-01-01 RX ADMIN — DEXMEDETOMIDINE HYDROCHLORIDE 0.6 MCG/KG/HR: 400 INJECTION INTRAVENOUS at 09:29

## 2024-01-01 RX ADMIN — ACETAMINOPHEN 650 MG: 325 TABLET, FILM COATED ORAL at 22:18

## 2024-01-01 RX ADMIN — LEVETIRACETAM 500 MG: 5 INJECTION, SOLUTION INTRAVENOUS at 22:32

## 2024-01-01 RX ADMIN — LEVETIRACETAM 1000 MG: 10 INJECTION INTRAVASCULAR at 09:29

## 2024-01-01 RX ADMIN — VANCOMYCIN HYDROCHLORIDE 1000 MG: 1 INJECTION, POWDER, LYOPHILIZED, FOR SOLUTION INTRAVENOUS at 12:05

## 2024-01-01 RX ADMIN — LEVETIRACETAM 1500 MG: 15 INJECTION INTRAVENOUS at 10:32

## 2024-01-01 RX ADMIN — POTASSIUM CHLORIDE AND DEXTROSE MONOHYDRATE 100 ML/HR: 150; 5 INJECTION, SOLUTION INTRAVENOUS at 15:08

## 2024-01-01 RX ADMIN — GLYCOPYRROLATE 0.1 MG: 0.2 INJECTION, SOLUTION INTRAMUSCULAR; INTRAVENOUS at 06:51

## 2024-01-01 RX ADMIN — PIPERACILLIN SODIUM AND TAZOBACTAM SODIUM 3.38 G: 3; .375 INJECTION, POWDER, LYOPHILIZED, FOR SOLUTION INTRAVENOUS at 01:47

## 2024-01-01 RX ADMIN — CHLORHEXIDINE GLUCONATE 0.12% ORAL RINSE 15 ML: 1.2 LIQUID ORAL at 20:14

## 2024-01-01 RX ADMIN — POTASSIUM CHLORIDE AND DEXTROSE MONOHYDRATE 100 ML/HR: 150; 5 INJECTION, SOLUTION INTRAVENOUS at 19:03

## 2024-01-01 RX ADMIN — DEXMEDETOMIDINE HYDROCHLORIDE 0.6 MCG/KG/HR: 400 INJECTION INTRAVENOUS at 02:20

## 2024-01-01 RX ADMIN — LORAZEPAM 1 MG: 2 INJECTION INTRAMUSCULAR; INTRAVENOUS at 16:53

## 2024-01-01 RX ADMIN — MORPHINE SULFATE 4 MG: 4 INJECTION, SOLUTION INTRAMUSCULAR; INTRAVENOUS at 15:03

## 2024-01-01 RX ADMIN — SODIUM BICARBONATE 50 MEQ: 84 INJECTION, SOLUTION INTRAVENOUS at 17:55

## 2024-01-01 RX ADMIN — FOSPHENYTOIN SODIUM 100 MG PE: 50 INJECTION, SOLUTION INTRAMUSCULAR; INTRAVENOUS at 17:32

## 2024-01-01 RX ADMIN — PIPERACILLIN AND TAZOBACTAM 4.5 G: 4; .5 INJECTION, POWDER, FOR SOLUTION INTRAVENOUS at 08:10

## 2024-01-01 RX ADMIN — PIPERACILLIN AND TAZOBACTAM 4.5 G: 4; .5 INJECTION, POWDER, FOR SOLUTION INTRAVENOUS at 00:01

## 2024-01-01 RX ADMIN — EPINEPHRINE 1 MG: 0.1 INJECTION INTRAVENOUS at 17:48

## 2024-01-01 RX ADMIN — ENOXAPARIN SODIUM 30 MG: 100 INJECTION SUBCUTANEOUS at 02:20

## 2024-01-01 RX ADMIN — SODIUM CHLORIDE 1000 MG PE: 9 INJECTION, SOLUTION INTRAVENOUS at 13:46

## 2024-01-01 RX ADMIN — PIPERACILLIN AND TAZOBACTAM 4.5 G: 4; .5 INJECTION, POWDER, FOR SOLUTION INTRAVENOUS at 15:08

## 2024-01-01 RX ADMIN — CHLORHEXIDINE GLUCONATE 0.12% ORAL RINSE 15 ML: 1.2 LIQUID ORAL at 10:41

## 2024-01-01 RX ADMIN — NOREPINEPHRINE BITARTRATE 0.04 MCG/KG/MIN: 8 INJECTION, SOLUTION INTRAVENOUS at 19:03

## 2024-01-01 RX ADMIN — GLYCOPYRROLATE 0.1 MG: 0.2 INJECTION, SOLUTION INTRAMUSCULAR; INTRAVENOUS at 10:40

## 2024-01-01 RX ADMIN — Medication 10 ML: at 10:19

## 2024-01-01 RX ADMIN — FOSPHENYTOIN SODIUM 100 MG PE: 50 INJECTION, SOLUTION INTRAMUSCULAR; INTRAVENOUS at 01:34

## 2024-01-01 RX ADMIN — Medication 150 MEQ: at 00:14

## 2024-01-01 RX ADMIN — Medication 10 ML: at 21:19

## 2024-01-01 RX ADMIN — PANTOPRAZOLE SODIUM 40 MG: 40 INJECTION, POWDER, FOR SOLUTION INTRAVENOUS at 08:10

## 2024-01-01 RX ADMIN — SCOPOLAMINE 1 PATCH: 1.5 PATCH, EXTENDED RELEASE TRANSDERMAL at 10:41

## 2024-01-01 RX ADMIN — MORPHINE SULFATE 4 MG: 4 INJECTION, SOLUTION INTRAMUSCULAR; INTRAVENOUS at 15:38

## 2024-01-01 RX ADMIN — POTASSIUM CHLORIDE AND DEXTROSE MONOHYDRATE 100 ML/HR: 150; 5 INJECTION, SOLUTION INTRAVENOUS at 22:32

## 2024-01-01 RX ADMIN — FOSPHENYTOIN SODIUM 100 MG PE: 50 INJECTION, SOLUTION INTRAMUSCULAR; INTRAVENOUS at 10:39

## 2024-01-01 RX ADMIN — FOSPHENYTOIN SODIUM 100 MG PE: 50 INJECTION, SOLUTION INTRAMUSCULAR; INTRAVENOUS at 18:12

## 2024-01-01 RX ADMIN — SODIUM CHLORIDE 500 ML: 9 INJECTION, SOLUTION INTRAVENOUS at 12:43

## 2024-01-01 RX ADMIN — LORAZEPAM 1 MG: 2 INJECTION INTRAMUSCULAR; INTRAVENOUS at 10:40

## 2024-09-24 PROBLEM — I46.9 CARDIAC ARREST: Status: ACTIVE | Noted: 2024-01-01

## 2024-09-24 PROBLEM — A04.9 BACTERIAL COLITIS: Status: ACTIVE | Noted: 2024-01-01

## 2024-09-24 PROBLEM — N17.9 AKI (ACUTE KIDNEY INJURY): Status: ACTIVE | Noted: 2024-01-01

## 2024-09-24 PROBLEM — R74.01 TRANSAMINITIS: Status: ACTIVE | Noted: 2024-01-01

## 2024-09-24 NOTE — Clinical Note
Level of Care: Critical Care [6]   Diagnosis: Cardiac arrest [427.5.ICD-9-CM]   Admitting Physician: ALAYNA ESPINO [[id number]]   Certification: I Certify That Inpatient Hospital Services Are Medically Necessary For Greater Than 2 Midnights

## 2024-09-25 PROBLEM — J96.01 ACUTE RESPIRATORY FAILURE WITH HYPOXIA AND HYPERCAPNIA: Status: ACTIVE | Noted: 2024-01-01

## 2024-09-25 PROBLEM — K72.00 ISCHEMIC HEPATITIS: Status: ACTIVE | Noted: 2024-01-01

## 2024-09-25 PROBLEM — J69.0 ASPIRATION PNEUMONITIS: Status: ACTIVE | Noted: 2024-01-01

## 2024-09-25 PROBLEM — K52.89 STERCORAL COLITIS: Status: ACTIVE | Noted: 2024-01-01

## 2024-09-25 PROBLEM — J96.02 ACUTE RESPIRATORY FAILURE WITH HYPOXIA AND HYPERCAPNIA: Status: ACTIVE | Noted: 2024-01-01

## 2024-09-25 PROBLEM — E87.20 METABOLIC ACIDOSIS: Status: ACTIVE | Noted: 2024-01-01

## 2024-09-25 NOTE — CONSULTS
Date of admission:  9/24/2024    Date of consultation:   September 25, 2024    Requested by:   Sharath Monae MD    PCP: Provider, No Known    Reason:  Acute Respiratory Failure requiring mechanical ventilation, following cardiac arrest    History of Present Illness:  84 y.o. female with past medical history listed for hypertension, CVA and dementia who actually had cardiac arrest after she choked on a strawberry.  The patient's daughter at the bedside says that her mother was visiting and while trying to eat a strawberry, it got stuck in her throat leading to cardiac arrest.  The patient's granddaughter performed CPR and according to the daughter EMS arrived 5 minutes later.    According to the notes, ROSC was obtained after 1 round of epinephrine in the ER.    The patient was intubated & transferred to ICU and pulmonary consultation was requested for further recommendations.     The patient's daughter says that at baseline, she is minimally functional.  The daughter also said that she barely recognizes anyone in the family.    No further history is available from the patient, as she is on the ventilator.    Review of System: Could not be obtained, as the patient is on Ventilator.     Past Medical History: Pertinent history reviewed, as appropriate. Negative, except noted below or in HPI.  If this history could not be obtained due to a reason, the reason is listed in the HPI.  Past Medical History:   Diagnosis Date    Dysphagia     Hypertension     Stroke 2013         Past Surgical History: Pertinent history reviewed, as appropriate. Negative, except noted below or in HPI. If this history could not be obtained due to a reason, the reason is listed in the HPI.  History reviewed. No pertinent surgical history.      Family History: Pertinent history reviewed, as appropriate. Negative, except noted below or in HPI. If this history could not be obtained due to a reason, the reason is listed in the HPI.  History reviewed.  "No pertinent family history.      Social History: Pertinent history reviewed, as appropriate. Negative, except noted below or in HPI. If this history could not be obtained due to a reason, the reason is listed in the HPI.  Social History     Socioeconomic History    Marital status:    Tobacco Use    Smoking status: Unknown    Smokeless tobacco: Never   Vaping Use    Vaping status: Never Used   Substance and Sexual Activity    Alcohol use: Never    Drug use: Never    Sexual activity: Defer           Physical Exam:  /62   Pulse 62   Temp 97.1 °F (36.2 °C) (Oral)   Resp 22   Ht 139.7 cm (55\")   Wt 47.4 kg (104 lb 8 oz)   SpO2 100%   BMI 24.29 kg/m²     CVP Line: Left IJ Present  OG/NG tube: Present  Butler catheter: Present    Constitutional:            Vital signs reviewed            Patient is intubated and sedated    Eyes:            Pupils appeared equal and reactive to light, 3 mm at baseline.    ENT:             Patient was intubated with endotracheal tube in place.     Neck:             Supple.  No obvious JVD noted.     Cardiovascular:              S1 + S2.  Appears regular    Lungs/Respiratory:            Transmitted breath sounds bilaterally with fair air entry.             Percussion could not be performed at this time.    GI/Abdomen:            Soft.  Bowel sounds sluggishly positive. No obvious organomegaly noted.    Musculoskeletal/Extremities:             Gait could not be assessed at this time.              No clubbing, cyanosis noted in the upper extremities.             No edema noted in the lower extremities bilaterally.    Neurologic:             Intubated and sedated             Did have occasional myoclonic movements especially in the region of the right shoulder.    Labs: Reviewed. Pertinent labs were noted.   Results from last 7 days   Lab Units 09/25/24  0412 09/24/24  1828   WBC 10*3/mm3 18.45* 12.31*   HEMOGLOBIN g/dL 9.5* 9.6*   HEMATOCRIT % 29.5* 33.3*   PLATELETS " "10*3/mm3 302 250   NEUTROS ABS 10*3/mm3  --  4.19   EOS ABS 10*3/mm3  --  0.62*       No results found for: \"PROCALCITO\"    Lab Results   Component Value Date    CRP 0.45 09/24/2024       No results found for: \"SEDRATE\"    No results found for: \"PROBNP\"    Results from last 7 days   Lab Units 09/25/24  0412 09/24/24  1828   SODIUM mmol/L 150* 143   POTASSIUM mmol/L 3.1* 5.3*   CHLORIDE mmol/L 104 104   CO2 mmol/L 30.8* 13.8*   BUN mg/dL 39* 28*   CREATININE mg/dL 1.23* 1.46*   CALCIUM mg/dL 9.4 9.6   ANION GAP mmol/L 15.2* 25.2*   BILIRUBIN mg/dL 0.4 0.3   ALK PHOS U/L 231* 135*   ALT (SGPT) U/L 235* 152*   AST (SGOT) U/L 339* 197*   GLUCOSE mg/dL 215* 240*   TOTAL PROTEIN g/dL 6.7 7.0   ALBUMIN g/dL 3.6 3.6       Results from last 7 days   Lab Units 09/25/24  0412 09/24/24  1828   MAGNESIUM mg/dL 3.0* 2.2   PHOSPHORUS mg/dL 3.3 8.0*       No results found for: \"TSH\"    No results found for: \"FREET4\"    Lab Results   Component Value Date    INR 1.32 (H) 09/24/2024       No results found for: \"CKTOTAL\"    No components found for: \"HSTROPT\"    No results found for: \"TROPONINT\"    No results found for: \"DDIMER\"    No results found for: \"LIPASE\"    Brief Urine Lab Results  (Last result in the past 365 days)        Color   Clarity   Blood   Leuk Est   Nitrite   Protein   CREAT   Urine HCG        09/24/24 2040 Yellow   Cloudy   Moderate (2+)   Small (1+)   Negative   100 mg/dL (2+)                     Micro: As of September 25, 2024   No results found for: \"RESPCX\"  No results found for: \"BCIDPCR\"  No results found for: \"BLOODCX\"  No results found for: \"URINECX\"  No results found for: \"MRSACX\"  No results found for: \"MRSAPCR\"  No results found for: \"URCX\"  No components found for: \"LOWRESPCF\"  No results found for: \"THROATCX\"  No results found for: \"CULTURES\"  No components found for: \"STREPBCX\"  No results found for: \"STREPPNEUAG\"  No results found for: \"LEGIONELLA\"  No results found for: \"LEGANTIGENUR\"  No results found " "for: \"MYCOPLASCX\"  No results found for: \"GCCX\"  No results found for: \"WOUNDCX\"  No results found for: \"BODYFLDCX\"    No results found for: \"FLU\"    No results found for: \"ADENOVIRUS\"  No results found for: \"NE836R\"  No results found for: \"CVHKU1\"  No results found for: \"CVNL63\"  No results found for: \"CVOC43\"  No results found for: \"HUMETPNEVS\"  No results found for: \"HURVEV\"  No results found for: \"FLUBPCR\"  No results found for: \"PARAINFLUE\"  No results found for: \"PARAFLUV2\"  No results found for: \"PARAFLUV3\"  No results found for: \"PARAFLUV4\"  No results found for: \"BPERTPCR\"  No results found for: \"OWFXK16403\"  No results found for: \"CPNEUPCR\"  No results found for: \"MPNEUMO\"  No results found for: \"FLUAPCR\"  No results found for: \"FLUAH3\"  No results found for: \"FLUAH1\"  No results found for: \"RSV\"  No results found for: \"BPARAPCR\"    COVID 19:  No results found for: \"COVID19\"        No results found for: \"THCURSCR\"  No results found for: \"PCPUR\"  No results found for: \"COCAINEUR\"  No results found for: \"METAMPSCNUR\"  No results found for: \"LABOPIASCN\"  No results found for: \"AMPHETSCREEN\"  No results found for: \"LABBENZSCN\"  No results found for: \"TRICYCLICSCN\"  No results found for: \"LABMETHSCN\"  No results found for: \"BARBITSCNUR\"  No results found for: \"OXYCODONESCN\"  No results found for: \"PROPOXSCN\"  No results found for: \"BUPRENORSCNU\"  No results found for: \"ETHANOLMGDL\"  No results found for: \"ETOHPCT\"      ABG:  Recent Labs     09/24/24  1759 09/24/24 1949   PHART <6.767* 7.362   NPT6FYX 70.5* 32.8*   PO2ART 113.0* 452.0*   UCF2HHZ 9.6* 18.6*   BASEEXCESS -25.3* -6.1*       Lab Results   Component Value Date    LACTATE 3.1 (C) 09/25/2024    LACTATE 4.8 (C) 09/25/2024    LACTATE 8.9 (C) 09/24/2024       Imaging Study: Latest imaging studies was reviewed personally.   Imaging Results (Last 72 Hours)       Procedure Component Value Units Date/Time    CT Abdomen Pelvis With Contrast [[id number]] " Collected: 09/24/24 2129     Updated: 09/24/24 2130    Narrative:      FINAL REPORT    TECHNIQUE:  null    CLINICAL HISTORY:  cardiac arrest    COMPARISON:  null    FINDINGS:  Exam: CT Abdomen and Pelvis with contrast    Comparison: None    Clinical history: Cardiac arrest    Findings:    NG tube tip in the stomach. Fluid in the distal esophagus may be reflective of gastroesophageal reflux.    Stomach is distended with fluid.    Mild bibasilar atelectasis.    Liver does not demonstrate any focal lesions.    Gallbladder not identified and is likely surgically absent.    Enlargement of the Intrahepatic biliary ducts, common hepatic duct, common bile duct and pancreatic duct. No choledocholithiasis identified. Findings may be due to pancreatic head lesion, ampullary stricture/lesion, sphincter dysfunction or post   cholecystectomy status. No pancreatic head lesion identified on today's exam.    Spleen has a normal appearance.    No acute pancreatitis.    Normal adrenal glands.    No renal stones. Symmetric enhancement of the kidneys bilaterally. No CT evidence for pyelonephritis.    No abdominal aortic aneurysm or dissection. Atherosclerotic vascular disease noted. .    No small bowel obstruction or ileus.    The cecum ascending colon and transverse colon are distended with gas, fluid and stool.    Very large amount of stool in the rectum with rectum distended to 8.7 x 9 x 16.4 cm. Mild rectal wall thickening with perirectal inflammation reflective of a stercoral colitis.    The urinary bladder and uterus are deviated ventrally secondary to the very large amount of stool in the rectum.    Urinary bladder is decompressed with a Butler catheter..    Trace amount of free fluid in the deep pelvis.    L4 compression fracture with 1/3 loss of vertebral body height.    Mild L5 compression fracture with 25% loss of vertebral body height.    Hemangioma at L2.    Bilateral L5 pars defects with grade 1 anterior subluxation.  Degenerative disc and endplate disease at L4-5 and L5-S1.    Mild bilateral hip arthritis.      Impression:      Impression:    1. Very large amount of stool in the rectum with rectum distended to 8.7 x 9 x 16.4 cm. Mild rectal wall thickening with perirectal inflammation reflective of a stercoral colitis. The cecum ascending colon and transverse colon are distended with gas,   fluid and stool. Moderate stool in the left colon.    2. NG tube tip in the stomach. Fluid in the distal esophagus may be reflective of gastroesophageal reflux. Stomach is distended with fluid.    3. Gallbladder not identified and is likely surgically absent.    4. Intra and extrahepatic biliary dilatation with no choledocholithiasis identified. Findings may be due to pancreatic head lesion, ampullary stricture/lesion, sphincter dysfunction or post cholecystectomy status. No pancreatic head lesion identified on   today's exam. MR/MRCP could be considered for further evaluation.    5. Urinary bladder is decompressed with a Butler catheter..    6. L4 and L5 compression fractures of indeterminate age. Please correlate with physical exam. Bilateral L5 pars defects. Degenerative disease of the lumbar spine.    Authenticated and Electronically Signed by Sarah Peralta MD  on 09/24/2024 09:29:37 PM    CT Angiogram Chest [[id number]] Collected: 09/24/24 2121     Updated: 09/24/24 2122    Narrative:      FINAL REPORT    TECHNIQUE:  null    CLINICAL HISTORY:  respiratory arrest    COMPARISON:  null    FINDINGS:  Exam: CTA Chest with IV contrast.    Procedure: Coronal and sagittal MIP reformats were performed    Comparison: None    Clinical history: Cardiac arrest    Findings:    No pulmonary emboli.    No thoracic aortic aneurysm or dissection.    No hilar or mediastinal adenopathy.    No pericardial fluid collection.    No pleural fluid collection.    Acute fractures of the left 5, 6 and 7 anterior ribs.    Acute fractures of the right anterior 5 and  6 ribs.    No pneumothorax.    Mild bibasilar atelectasis.    The right upper arm is only partially visualized. Low-density ill-defined fluid and air seen in the soft tissues of the right upper arm.    T3 compression fracture with 10% loss of vertebral body height. Age of the fracture is indeterminate.    T12 compression fracture with 25% loss of vertebral body height. Age of the fracture is indeterminate.      Impression:      Impression:    1. No pulmonary emboli.    2. No thoracic aortic aneurysm or dissection    3. Acute fractures of the left 5th-7th anterior ribs and right anterior 6th and 7th ribs. No pneumothorax. Mild bibasilar atelectasis.    4. The right upper arm is only partially visualized. Low-density ill-defined fluid and air seen in the soft tissues of the right upper arm. Please correlate clinically.    5. T3 and T12 compression fractures of indeterminate age. Please correlate with physical exam. MRI could be considered for further evaluation.    Authenticated and Electronically Signed by Sarah Peralta MD  on 09/24/2024 09:21:12 PM    CT Head Without Contrast [[id number]] Collected: 09/24/24 2101     Updated: 09/24/24 2103    Narrative:      FINAL REPORT    TECHNIQUE:  null    CLINICAL HISTORY:  post code    COMPARISON:  null    FINDINGS:  CT HEAD WITHOUT CONTRAST    Comparison: None    Findings:    There is motion artifact.    No acute intracranial hemorrhage, extra-axial fluid collection, hydrocephalus or midline shift.    Age appropriate generalized parenchymal atrophy.    There are periventricular and subcortical white matter hypodensities which are nonspecific but most likely related to microangiopathic gliosis.    Intracranial arteriosclerosis.    There is no sinus or mastoid fluid.    Visualized orbits: No acute abnormalities.    There is no acute fracture.      Impression:      IMPRESSION:    1. No acute intracranial process.    Authenticated and Electronically Signed by Becki Franklin DO  on  09/24/2024 09:01:33 PM    XR Abdomen KUB [[id number]] Resulted: 09/24/24 1922     Updated: 09/24/24 1922    XR Chest 1 View [[id number]] Collected: 09/24/24 1913     Updated: 09/24/24 1915    Narrative:      FINAL REPORT    TECHNIQUE:  null    CLINICAL HISTORY:  Severe Sepsis triage protocol    ET and central line placement    COMPARISON:  null    FINDINGS:  1 view chest x-ray    Comparison: None    Findings:    Endotracheal tube distal tip is positioned 2.5 cm superior to the claude. A left central line crosses midline with distal tip terminating at the level of the right atrium. A percutaneous pacer overlies the superior lateral right hemithorax.    Linear scarring or atelectasis involving the left lung base. A large granuloma of the periphery of the mid left lung measures 1.4 cm. No focal consolidation or large pleural effusion. No pneumothorax. The heart size is normal. Retrocardiac atelectasis.    No acute fractures. The stomach lumen is gas-filled and dilated.      Impression:      IMPRESSION:    1. Endotracheal tube terminates 2.5 cm superior to the claude. Left central line crosses midline with distal tip overlying the expected location of the right atrium.    2. Likely scarring or atelectasis of the left lung base including the retrocardiac region without acute process.    Authenticated and Electronically Signed by Anthony Egan DO on  09/24/2024 07:13:52 PM              ECHO:        dexmedetomidine, 0.5 mcg/kg/hr, Last Rate: Stopped (09/25/24 0618)  midazolam, 1-10 mg/hr, Last Rate: Stopped (09/25/24 0751)  norepinephrine, 0.02-0.3 mcg/kg/min, Last Rate: Stopped (09/25/24 0629)  Pharmacy to Dose enoxaparin (LOVENOX),   Pharmacy to Dose Zosyn,   sodium bicarbonate 8.4 % 150 mEq in sodium chloride 0.45 % 1,000 mL infusion (greater than 100 mEq), 150 mEq, Last Rate: 150 mEq (09/25/24 0014)          Assessment:  1.  Acute Respiratory Failure.  2.  Status post cardiac arrest  3.  Metabolic acidosis  4.  Shock  liver   5.  Acute renal failure/hypernatremia  6.  Underlying dementia    Discussion/Recommendations:   I have adjusted the ventilator settings. ABG and Chest X Ray will be ordered as appropriate.     The patient is definitely at very high risk for anoxic brain injury given the fact that timing and duration of cardiac arrest is difficult to ascertain.    I have asked nursing staff to stop Versed drip for now to assess mental status.    I have asked the nursing staff to use Versed as needed for any seizure-like activity.    I told the patient's daughter regarding the likelihood/possibility of hypoxic/anoxic brain injury.    She is aware of the same, as already mentioned by ER physician and hospitalist.    She says that she will discuss this further with her family members.    All relevant recommendations were, and will be, discussed with Sharath Monae MD, as indicated    I would like to thank you for the opportunity to participate in the care of this patient.  We will communicate changes and recommendations, if and when necessary.      This document was electronically signed by Vincent Castro MD on 09/25/24 at 07:52 EDT      Dictated utilizing Dragon dictation.

## 2024-09-25 NOTE — H&P
[facility]   HISTORY AND PHYSICAL      Name:  Annabel Hyman   Age:  84 y.o.  Sex:  female  :  1940  MRN:  [id number]   Visit Number:  [id number]  Admission Date:  2024  Date Of Service:  24  Primary Care Physician:  Provider, No Known    Chief Complaint:     Cardiac arrest    History Of Presenting Illness:      84 female history provided by daughter and son. PMHx stroke, hypertension, Alzheimer's dementia, not walking for 2 years. Can move in a wheel chair if pushed by someone. Lives with another son who is not here. She lives near [place]. Was visiting today. Ate a strawberry got stuck in the throat. Leading to cardiac arrest. Was intubated and the strawberry was removed in the field. Had CPR initiated in the field.  Was continued on Matthew device when arrived to the ED received 1 epinephrine in the ED with ROSC.  Since admission she has been noted to have diarrhea and reduced rectal tone. Full code but would only say one more time to the CPR.    Pertinent findings: pH 7.362, lactic acid 14 to 8, UA mild bacteriuria, hematuria, CT abdomen shows stool in abdomen concern for stercoral colitis, CT chest rib fractures, right upper arm possible injury, CT head ok. Creatinine 1.46,  , WBC 12.31, Blood cultures pending    ED Medications:    Medications   sodium chloride 0.9 % flush 10 mL (has no administration in time range)   dexmedetomidine (PRECEDEX) 400 mcg in 100 mL NS infusion (0.5 mcg/kg/hr × 49 kg Intravenous Currently Infusing 24)   sodium bicarbonate 8.4 % 150 mEq in sodium chloride 0.45 % 1,000 mL infusion (greater than 100 mEq) ( Intravenous Currently Infusing 24)   norepinephrine (LEVOPHED) 8 mg in 250mL D5W infusion (0.04 mcg/kg/min × 49 kg Intravenous Rate/Dose Change 24)   midazolam (VERSED) 100 mg in 100mL NS infusion (2 mg/hr Intravenous Currently Infusing 24)   Midazolam HCl (PF) (VERSED) injection  "2 mg (2 mg Intravenous Given 9/24/24 1814)   EPINEPHrine (ADRENALIN) injection (1 mg Intravenous Given 9/24/24 1748)   sodium bicarbonate injection 8.4% (50 mEq Intravenous Given 9/24/24 1812)   calcium chloride injection (1 g Intravenous Given 9/24/24 1806)   magnesium sulfate injection (2 g Intravenous Given 9/24/24 1807)   Midazolam HCl (PF) (VERSED) injection (2 mg Intravenous Given 9/24/24 1812)   iopamidol (ISOVUE-300) 61 % injection 100 mL (100 mL Intravenous Given 9/24/24 2033)   Midazolam HCl (PF) (VERSED) injection 2 mg (2 mg Intravenous Given 9/24/24 1950)       Edited by: Hugo Mccormick DO at 9/25/2024 0115     Review Of Systems:    All systems were reviewed and negative except as mentioned in history of presenting illness, assessment and plan.    Past Medical History: Patient  has a past medical history of Dysphagia, Hypertension, and Stroke (2013).    Past Surgical History: Patient  has no past surgical history on file.    Social History: Patient  reports that she has an unknown smoking status. She has never used smokeless tobacco. She reports that she does not drink alcohol and does not use drugs.    Family History:  Patient's family history has been reviewed and found to be noncontributory.     Allergies:      Patient has no known allergies.    Home Medications:    Prior to Admission Medications       None              Vital Signs:  Temp:  [99.2 °F (37.3 °C)] 99.2 °F (37.3 °C)  Heart Rate:  [] 58  Resp:  [23-24] 23  BP: ()/(48-79) 132/67  FiO2 (%):  [50 %-100 %] 50 %        09/24/24 1808   Weight: 49 kg (108 lb)     Body mass index is 20.41 kg/m².    Physical Exam:     Most recent vital Signs: /67   Pulse 58   Temp 99.2 °F (37.3 °C) (Rectal)   Resp 23   Ht 154.9 cm (61\")   Wt 49 kg (108 lb)   SpO2 100%   BMI 20.41 kg/m²     Constitutional: sedated on vent, endotracheal tube in place.  Eyes: PERRLA, sclerae anicteric, no conjunctival injection  HENT: NCAT, mucous " membranes moist  Neck: Supple, no thyromegaly, no lymphadenopathy, trachea midline  Respiratory: Clear to auscultation bilaterally, nonlabored respirations   Cardiovascular: RRR, no murmurs, rubs, or gallops, palpable pedal pulses bilaterally  Gastrointestinal: Positive bowel sounds, soft, nontender, nondistended  Musculoskeletal: No bilateral ankle edema, no clubbing or cyanosis to extremities  Psychiatric: sedated on vent  Neurologic: sedated on vent  Skin: No rashes  Edited by: Hugo Mccormick DO at 9/24/2024 1446      Laboratory data:    I have reviewed the labs done in the emergency room.    Results from last 7 days   Lab Units 09/24/24  1828   SODIUM mmol/L 143   POTASSIUM mmol/L 5.3*   CHLORIDE mmol/L 104   CO2 mmol/L 13.8*   BUN mg/dL 28*   CREATININE mg/dL 1.46*   CALCIUM mg/dL 9.6   BILIRUBIN mg/dL 0.3   ALK PHOS U/L 135*   ALT (SGPT) U/L 152*   AST (SGOT) U/L 197*   GLUCOSE mg/dL 240*     Results from last 7 days   Lab Units 09/24/24  1828   WBC 10*3/mm3 12.31*   HEMOGLOBIN g/dL 9.6*   HEMATOCRIT % 33.3*   PLATELETS 10*3/mm3 250     Results from last 7 days   Lab Units 09/24/24  1828   INR  1.32*                     Results from last 7 days   Lab Units 09/24/24  1949   PH, ARTERIAL pH units 7.362   PO2 ART mm Hg 452.0*   PCO2, ARTERIAL mm Hg 32.8*   HCO3 ART mmol/L 18.6*     Results from last 7 days   Lab Units 09/24/24  2040   COLOR UA  Yellow   GLUCOSE UA  Negative   KETONES UA  Negative   BLOOD UA  Moderate (2+)*   LEUKOCYTES UA  Small (1+)*   PH, URINE  6.0   BILIRUBIN UA  Negative   UROBILINOGEN UA  0.2 E.U./dL   RBC UA /HPF 11-20*   WBC UA /HPF 6-10*       Pain Management Panel           No data to display                EKG:      Atrial fibrillation    Radiology:    CT Abdomen Pelvis With Contrast    Result Date: 9/24/2024  FINAL REPORT TECHNIQUE: null CLINICAL HISTORY: cardiac arrest COMPARISON: null FINDINGS: Exam: CT Abdomen and Pelvis with contrast Comparison: None Clinical history:  Cardiac arrest Findings: NG tube tip in the stomach. Fluid in the distal esophagus may be reflective of gastroesophageal reflux. Stomach is distended with fluid. Mild bibasilar atelectasis. Liver does not demonstrate any focal lesions. Gallbladder not identified and is likely surgically absent. Enlargement of the Intrahepatic biliary ducts, common hepatic duct, common bile duct and pancreatic duct. No choledocholithiasis identified. Findings may be due to pancreatic head lesion, ampullary stricture/lesion, sphincter dysfunction or post cholecystectomy status. No pancreatic head lesion identified on today's exam. Spleen has a normal appearance. No acute pancreatitis. Normal adrenal glands. No renal stones. Symmetric enhancement of the kidneys bilaterally. No CT evidence for pyelonephritis. No abdominal aortic aneurysm or dissection. Atherosclerotic vascular disease noted. . No small bowel obstruction or ileus. The cecum ascending colon and transverse colon are distended with gas, fluid and stool. Very large amount of stool in the rectum with rectum distended to 8.7 x 9 x 16.4 cm. Mild rectal wall thickening with perirectal inflammation reflective of a stercoral colitis. The urinary bladder and uterus are deviated ventrally secondary to the very large amount of stool in the rectum. Urinary bladder is decompressed with a Butler catheter.. Trace amount of free fluid in the deep pelvis. L4 compression fracture with 1/3 loss of vertebral body height. Mild L5 compression fracture with 25% loss of vertebral body height. Hemangioma at L2. Bilateral L5 pars defects with grade 1 anterior subluxation. Degenerative disc and endplate disease at L4-5 and L5-S1. Mild bilateral hip arthritis.     Impression: 1. Very large amount of stool in the rectum with rectum distended to 8.7 x 9 x 16.4 cm. Mild rectal wall thickening with perirectal inflammation reflective of a stercoral colitis. The cecum ascending colon and transverse colon  are distended with gas, fluid and stool. Moderate stool in the left colon. 2. NG tube tip in the stomach. Fluid in the distal esophagus may be reflective of gastroesophageal reflux. Stomach is distended with fluid. 3. Gallbladder not identified and is likely surgically absent. 4. Intra and extrahepatic biliary dilatation with no choledocholithiasis identified. Findings may be due to pancreatic head lesion, ampullary stricture/lesion, sphincter dysfunction or post cholecystectomy status. No pancreatic head lesion identified on today's exam. MR/MRCP could be considered for further evaluation. 5. Urinary bladder is decompressed with a Butler catheter.. 6. L4 and L5 compression fractures of indeterminate age. Please correlate with physical exam. Bilateral L5 pars defects. Degenerative disease of the lumbar spine. Authenticated and Electronically Signed by Sarah Peralta MD on 09/24/2024 09:29:37 PM    CT Angiogram Chest    Result Date: 9/24/2024  FINAL REPORT TECHNIQUE: null CLINICAL HISTORY: respiratory arrest COMPARISON: null FINDINGS: Exam: CTA Chest with IV contrast. Procedure: Coronal and sagittal MIP reformats were performed Comparison: None Clinical history: Cardiac arrest Findings: No pulmonary emboli. No thoracic aortic aneurysm or dissection. No hilar or mediastinal adenopathy. No pericardial fluid collection. No pleural fluid collection. Acute fractures of the left 5, 6 and 7 anterior ribs. Acute fractures of the right anterior 5 and 6 ribs. No pneumothorax. Mild bibasilar atelectasis. The right upper arm is only partially visualized. Low-density ill-defined fluid and air seen in the soft tissues of the right upper arm. T3 compression fracture with 10% loss of vertebral body height. Age of the fracture is indeterminate. T12 compression fracture with 25% loss of vertebral body height. Age of the fracture is indeterminate.     Impression: 1. No pulmonary emboli. 2. No thoracic aortic aneurysm or dissection 3.  Acute fractures of the left 5th-7th anterior ribs and right anterior 6th and 7th ribs. No pneumothorax. Mild bibasilar atelectasis. 4. The right upper arm is only partially visualized. Low-density ill-defined fluid and air seen in the soft tissues of the right upper arm. Please correlate clinically. 5. T3 and T12 compression fractures of indeterminate age. Please correlate with physical exam. MRI could be considered for further evaluation. Authenticated and Electronically Signed by Sarah Peralta MD on 09/24/2024 09:21:12 PM    CT Head Without Contrast    Result Date: 9/24/2024  FINAL REPORT TECHNIQUE: null CLINICAL HISTORY: post code COMPARISON: null FINDINGS: CT HEAD WITHOUT CONTRAST Comparison: None Findings: There is motion artifact. No acute intracranial hemorrhage, extra-axial fluid collection, hydrocephalus or midline shift. Age appropriate generalized parenchymal atrophy. There are periventricular and subcortical white matter hypodensities which are nonspecific but most likely related to microangiopathic gliosis. Intracranial arteriosclerosis. There is no sinus or mastoid fluid. Visualized orbits: No acute abnormalities. There is no acute fracture.     IMPRESSION: 1. No acute intracranial process. Authenticated and Electronically Signed by Becki Franklin DO on 09/24/2024 09:01:33 PM    XR Chest 1 View    Result Date: 9/24/2024  FINAL REPORT TECHNIQUE: null CLINICAL HISTORY: Severe Sepsis triage protocol ET and central line placement COMPARISON: null FINDINGS: 1 view chest x-ray Comparison: None Findings: Endotracheal tube distal tip is positioned 2.5 cm superior to the claude. A left central line crosses midline with distal tip terminating at the level of the right atrium. A percutaneous pacer overlies the superior lateral right hemithorax. Linear scarring or atelectasis involving the left lung base. A large granuloma of the periphery of the mid left lung measures 1.4 cm. No focal consolidation or large  pleural effusion. No pneumothorax. The heart size is normal. Retrocardiac atelectasis. No acute fractures. The stomach lumen is gas-filled and dilated.     IMPRESSION: 1. Endotracheal tube terminates 2.5 cm superior to the claude. Left central line crosses midline with distal tip overlying the expected location of the right atrium. 2. Likely scarring or atelectasis of the left lung base including the retrocardiac region without acute process. Authenticated and Electronically Signed by Anthony Egan DO on 09/24/2024 07:13:52 PM     Assessment/Plan:      Cardiac arrest    ANDERS (acute kidney injury)    Transaminitis    Bacterial colitis      -- cardiac arrest due to airway obstruction resolved. Concern for stercoral colitis now with diarrhea.  Intubated, G-tube, FMS, Butler 9/24/2024.  -- Active Treatments: zosyn, vent management, sedation  -- Pending Results: Neurology, pulmonology, Palliative, am labs      DVT Prophylaxis: lovenox  Code Status: Full  Diet:  NPO  Risk assessment: high anticipate greater than 2 days, poor prognosis expressed to family          Edited by: Hugo Mccormick DO at 9/24/2024 3043       Advance Care Planning   ACP discussion was held with the patient during this visit. Patient does not have an advance directive, declines further assistance.           Hugo Mccormick DO  09/25/24  01:15 EDT    Dictated utilizing Dragon dictation.

## 2024-09-25 NOTE — PROGRESS NOTES
Pharmacokinetic Consult - Piperacillin-tazobactam Dosing  Annabel Hyman is a 84 y.o. female who has been consulted to dose piperacillin-tazobactam for  intra-abdominal infection .    Current Antimicrobial Therapy    Anti-Infectives (From admission, onward)      Ordered     Dose/Rate Route Frequency Start Stop    09/25/24 0612  piperacillin-tazobactam (ZOSYN) IVPB 4.5 g IVPB in 100 mL NS (VTB)        Ordering Provider: Sharath Monae MD    4.5 g  over 4 Hours Intravenous Every 8 Hours 09/25/24 0800 09/30/24 0759    09/25/24 0045  piperacillin-tazobactam (ZOSYN) IVPB 3.375 g IVPB in 100 mL NS (VTB)        Ordering Provider: Hugo Mccormick DO    3.375 g  over 30 Minutes Intravenous Once 09/25/24 0145 09/25/24 0217    09/25/24 0004  Pharmacy to Dose Zosyn        Ordering Provider: Hugo Mccormick DO     Does not apply Continuous PRN 09/25/24 0002 09/30/24 0001            Microbiology Results (last 10 days)       ** No results found for the last 240 hours. **             Allergies  Patient has no known allergies.    Relevant clinical data and objective history reviewed:  Creatinine   Date Value Ref Range Status   09/25/2024 1.23 (H) 0.57 - 1.00 mg/dL Final     Estimated Creatinine Clearance: 24.6 mL/min (A) (by C-G formula based on SCr of 1.23 mg/dL (H)).  No intake/output data recorded.  Patient weight: 47.4 kg (104 lb 8 oz)    Asessment/Plan  Due to patient requring vasoactive therapy and mechanical ventilation, will initiate piperacillin-tazobactam 4.5g IV every 8 hours  Pharmacy will monitor Ms. Hyman's renal function and clinical status and adjust the piperacillin-tazobactam dose and/or frequency as needed.    Thanks,     Naif Jones, PharmD  9/25/2024 06:15 EDT

## 2024-09-25 NOTE — CONSULTS
DOS: 2024  NAME: Annabel Hyman   : 1940  PCP: Provider, No Known  CC: Status epilepticus  Referring MD: Sharath Monae MD    Neurological Problem and Interval History:  84 y.o. right-handed white female with a Hx of progressive dementia and prior stroke approximately 7 years ago for which she had been hospitalized in this institution at that time and currently stays at home with family members and is in a wheelchair was eating strawberry when she choked on it and went into full-blown cardiac arrest.  CPR was started by family members and then the EMT arrived and restoration of circulation took almost 20 minutes to accomplish.  Since then the patient has been on the ventilator and has been off all sedatives but was known to be having some rhythmic generalized jerking spells which were witnessed by me on 3 such occasions during the evaluation of the patient.  Patient is currently intubated and on the ventilator and her blood pressure tends to drop if intravenous propofol drip is started.  Intravenous Precedex was also started but her blood pressure started dropping and also she has prolonged QTc interval at this point.  Discussed with family members and she is currently full code.  Patient has intermittent rhythmic jerking of both upper and lower extremities.  When stimulated in the upper extremities she tends to exhibit decerebrate posturing.  When the inner part of the thighs was pinched she started pulling back her legs.  She has a good gag response and she did turn her head to the left side when the left side of the neck was pinched.    Past Medical/Surgical Hx:  Past Medical History:   Diagnosis Date    Dysphagia     Hypertension     Stroke 2013     History reviewed. No pertinent surgical history.    Review of Systems:    Constitutional: Very thin and slim lady currently intubated and on the ventilator.  Cardiovascular: Patient had cardiac arrest with return of circulation after 20 minutes in an  outside situation.  Currently the blood pressure and heart rate tends to drop if intravenous Precedex or propofol drips are started.  Respiratory: Intubated and on the ventilator and breathing with the ventilator.  Gastrointestinal: A nasogastric tube has been placed..  Genitourinary: Butler catheter placed  Musculoskeletal: Intermittent rhythmic jerks noticeable in the upper extremities.  Dermatological: No skin breakdown noted.  Neurological: Comatose with Royce Coma Scale of 3.  Psychiatric: Unable to evaluate at this time.  Ophthalmological: Pupils are very sluggishly reacting to light.  The doll's eye movements are absent.          Medications On Admission  No medications prior to admission.       Prior to Admission medications    Not on File        Allergies:  No Known Allergies    Social Hx:  Social History     Socioeconomic History    Marital status:    Tobacco Use    Smoking status: Unknown    Smokeless tobacco: Never   Vaping Use    Vaping status: Never Used   Substance and Sexual Activity    Alcohol use: Never    Drug use: Never    Sexual activity: Defer       Family Hx:  History reviewed. No pertinent family history.    Review of Imaging (Interpretation of images not reports): The KUB shows the following:  FINDINGS:  A supine view of the abdomen was obtained. There is gaseous  distention of the stomach as seen on CT earlier the same day. As seen on  prior CT there is a NG tube with the tip in the fundus of the stomach.  The sideport is located in the distal esophagus; recommend advancement  by 4 to 5 cm..  There are no pathologic calcifications.  No acute  osseous abnormality is identified. There is evidence of old calcified  granulomatous disease. Left internal jugular catheter identified with  tip in the right atrium.     IMPRESSION:  NG tube tip present in the fundus of the stomach with  sideport in the distal esophagus; recommend advancement of NG tube by 4  to 5 cm..     Partially visualized  left internal jugular catheter.      CT Head Without Contrast    Result Date: 9/24/2024  FINAL REPORT TECHNIQUE: null CLINICAL HISTORY: post code COMPARISON: null FINDINGS: CT HEAD WITHOUT CONTRAST Comparison: None Findings: There is motion artifact. No acute intracranial hemorrhage, extra-axial fluid collection, hydrocephalus or midline shift. Age appropriate generalized parenchymal atrophy. There are periventricular and subcortical white matter hypodensities which are nonspecific but most likely related to microangiopathic gliosis. Intracranial arteriosclerosis. There is no sinus or mastoid fluid. Visualized orbits: No acute abnormalities. There is no acute fracture.     Impression: IMPRESSION: 1. No acute intracranial process. Authenticated and Electronically Signed by Becki Franklin DO on 09/24/2024 09:01:33 PM      CT Angiogram Chest    Result Date: 9/24/2024  FINAL REPORT TECHNIQUE: null CLINICAL HISTORY: respiratory arrest COMPARISON: null FINDINGS: Exam: CTA Chest with IV contrast. Procedure: Coronal and sagittal MIP reformats were performed Comparison: None Clinical history: Cardiac arrest Findings: No pulmonary emboli. No thoracic aortic aneurysm or dissection. No hilar or mediastinal adenopathy. No pericardial fluid collection. No pleural fluid collection. Acute fractures of the left 5, 6 and 7 anterior ribs. Acute fractures of the right anterior 5 and 6 ribs. No pneumothorax. Mild bibasilar atelectasis. The right upper arm is only partially visualized. Low-density ill-defined fluid and air seen in the soft tissues of the right upper arm. T3 compression fracture with 10% loss of vertebral body height. Age of the fracture is indeterminate. T12 compression fracture with 25% loss of vertebral body height. Age of the fracture is indeterminate.     Impression: Impression: 1. No pulmonary emboli. 2. No thoracic aortic aneurysm or dissection 3. Acute fractures of the left 5th-7th anterior ribs and right anterior  6th and 7th ribs. No pneumothorax. Mild bibasilar atelectasis. 4. The right upper arm is only partially visualized. Low-density ill-defined fluid and air seen in the soft tissues of the right upper arm. Please correlate clinically. 5. T3 and T12 compression fractures of indeterminate age. Please correlate with physical exam. MRI could be considered for further evaluation. Authenticated and Electronically Signed by Sarah Peralta MD on 09/24/2024 09:21:12 PM           Additional Tests Performed: Portable chest x-ray shows the following:    Findings:     Endotracheal tube distal tip is positioned 2.5 cm superior to the claude. A left central line crosses midline with distal tip terminating at the level of the right atrium. A percutaneous pacer overlies the superior lateral right hemithorax.     Linear scarring or atelectasis involving the left lung base. A large granuloma of the periphery of the mid left lung measures 1.4 cm. No focal consolidation or large pleural effusion. No pneumothorax. The heart size is normal. Retrocardiac atelectasis.     No acute fractures. The stomach lumen is gas-filled and dilated.     IMPRESSION:  IMPRESSION:     1. Endotracheal tube terminates 2.5 cm superior to the claude. Left central line crosses midline with distal tip overlying the expected location of the right atrium.     2. Likely scarring or atelectasis of the left lung base including the retrocardiac region without acute process.              Laboratory Results:   Lab Results   Component Value Date    GLUCOSE 215 (H) 09/25/2024    CALCIUM 9.4 09/25/2024     (H) 09/25/2024    K 3.1 (L) 09/25/2024    CO2 30.8 (H) 09/25/2024     09/25/2024    BUN 39 (H) 09/25/2024    CREATININE 1.23 (H) 09/25/2024    BCR 31.7 (H) 09/25/2024    ANIONGAP 15.2 (H) 09/25/2024     Lab Results   Component Value Date    WBC 18.45 (H) 09/25/2024    HGB 9.5 (L) 09/25/2024    HCT 29.5 (L) 09/25/2024    MCV 82.6 09/25/2024     09/25/2024  "      Lab Results   Component Value Date    INR 1.32 (H) 09/24/2024    PROTIME 16.9 (H) 09/24/2024            Physical Examination:  /76   Pulse 90   Temp 97.5 °F (36.4 °C) (Oral)   Resp 20   Ht 139.7 cm (55\")   Wt 47.4 kg (104 lb 8 oz)   SpO2 97%   BMI 24.29 kg/m²   General Appearance:   Well developed, well nourished, well groomed, alert, and cooperative.  HEENT: Normocephalic.    Neck and Spine: Normal range of motion.  Normal alignment. No mass or tenderness. No bruits.    Extremities:    No edema or deformities. Normal joint ROM.   Skin:    No rashes or birth marks.    Neurological examination:  Higher Integrative  Function: Comatose with Royce Coma Scale of 3.  CN II:  Pupils are equal, round, and sluggishly reactive to light.     CN III IV VI: Extraocular movements are absent and the doll's eye movements is absent.   CN V:  Does not interact with noxious stimulation applied to the face.  CN VII:  Facial movements are symmetric. No weakness.  CN VIII: Auditory acuity is normal.  CN IX & X: Symmetric palatal movement.  CN XI:  Sternocleidomastoid and trapezius are normal.  Turns her head to the left side when pinched on the left side of the neck..  CN XII:  The tongue is midline.  No atrophy or fasciculations.  Motor:  Normal muscle strength, bulk and tone in upper and lower extremities.  Exhibits decerebrate posturing on applying noxious stimulation to the finger beds.  Pulls the legs up when the inner part of the thighs are pinched..  Sensation: Normal to light touch, pinprick, vibration, temperature, and proprioception in arms and legs. Normal graphesthesia and no extinction on DSS.  Station and Gait: Patient not weightbearing at this point..    Coordination:  Could not be tested at this time..      Diagnoses / Discussion:  84 y.o. who presents with Sx of anoxic brain injury from the prolonged cardiac resuscitation as it took almost 20 minutes outside the hospital for witnessed cardiac " arrest.    Plan:  Patient's family members want to keep her full code at this time.  Discussed that the prognosis would be very grim.  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  Will load her with intravenous Keppra at a dose of 60 mg/kg body weight for 1 dose  Will also load her with intravenous fosphenytoin at a dose of 20 mg/kg body weight for 1 dose.  Spoke to the pharmacist who said it will be available today after 2 hours as it has to be brought from in patient pharmacy in [place].  An EEG has also been requested which might be getting done this later part of the day or tomorrow morning.  Even after loading with the anticonvulsants with the patient still continues to have these jerking episodes then the prognosis is guarded..     I have discussed the above with the patient and family.  Time spent with patient: 70 minutes in this critical care evaluation and management of the patient with anoxic brain injury resulting in status epilepticus using the dedicated telemedicine device without any interruption with the help of Ms. Janice Islas, the rounding nurse with the patient located at the [facility] and myself at a remote location.    Electronically signed by Cheikh Kiran MD, 09/25/24, 10:42 AM EDT.    Dictated using Dragon dictation.

## 2024-09-25 NOTE — CASE MANAGEMENT/SOCIAL WORK
Discharge Planning Assessment  [facility]     Patient Name: Annabel Hyman  MRN: [id number]  Today's Date: 9/25/2024    Admit Date: 9/24/2024    Plan: DCP is to return home. Pt has a history of dementia. She is completely dependent for ADL's and mobility and has been wheelcair bound for 2 years. Daughters take care of her and alternate having her live with them. Pt is currently intubated and unable to participate in conversation. I spoke to her daughters Any and Juana at the bedside. Confirmed demographics. She does not have a living will or POA. PCP; [facility]. Daughter confirmed she does not have health insurance at this time. Stating it lapsed and they did not renew it. Agreed to meds to bed. She has a wheelchair, hospital bed, BSC and shower chair, they do not anticipate and new DME needs.Transportation home dependent upon course of recovery.   Discharge Needs Assessment       Row Name 09/25/24 1322       Living Environment    People in Home child(nolan), adult    Name(s) of People in Home Daughters Mildred Agarwal and Juana take turns taking care of pt at their homes.    Current Living Arrangements home    Potentially Unsafe Housing Conditions none    In the past 12 months has the electric, gas, oil, or water company threatened to shut off services in your home? No    Primary Care Provided by child(nolan)    Provides Primary Care For no one, unable/limited ability to care for self    Family Caregiver if Needed child(nolan), adult    Family Caregiver Names Daughters Mildred Agarwal, and Juana.    Quality of Family Relationships helpful;involved;supportive    Able to Return to Prior Arrangements yes       Resource/Environmental Concerns    Resource/Environmental Concerns none    Transportation Concerns none       Transportation Needs    In the past 12 months, has lack of transportation kept you from medical appointments or from getting medications? no    In the past 12 months, has lack of  transportation kept you from meetings, work, or from getting things needed for daily living? No       Food Insecurity    Within the past 12 months, you worried that your food would run out before you got the money to buy more. Never true    Within the past 12 months, the food you bought just didn't last and you didn't have money to get more. Never true       Transition Planning    Patient/Family Anticipates Transition to home with family    Patient/Family Anticipated Services at Transition none    Transportation Anticipated family or friend will provide       Discharge Needs Assessment    Readmission Within the Last 30 Days no previous admission in last 30 days    Equipment Currently Used at Home hospital bed;shower chair;wheelchair;commode    Concerns to be Addressed denies needs/concerns at this time    Anticipated Changes Related to Illness none;inability to care for self    Equipment Needed After Discharge none                   Discharge Plan       Row Name 09/25/24 1649       Plan    Plan DCP is to return home. Pt has a history of dementia. She is completely dependent for ADL's and mobility and has been wheelcair bound for 2 years. Daughters take care of her and alternate having her live with them. Pt is currently intubated and unable to participate in conversation. I spoke to her daughters Any and Juana at the bedside. Confirmed demographics. She does not have a living will or POA. PCP; [facility]. Daughter confirmed she does not have health insurance at this time. Stating it lapsed and they did not renew it. Agreed to meds to bed. She has a wheelchair, hospital bed, BSC and shower chair, they do not anticipate and new DME needs.Transportation home dependent upon course of recovery.                  Continued Care and Services - Admitted Since 9/24/2024    No active coordination exists for this encounter.          Demographic Summary       Row Name 09/25/24 1326       General Information     Admission Type inpatient    Arrived From emergency department    Referral Source admission list    Reason for Consult discharge planning       Contact Information    Permission Granted to Share Info With ;family/designee                   Functional Status       Row Name 09/25/24 1322       Functional Status    Usual Activity Tolerance poor    Current Activity Tolerance poor       Physical Activity    On average, how many days per week do you engage in moderate to strenuous exercise (like a brisk walk)? 0 days    On average, how many minutes do you engage in exercise at this level? 0 min    Number of minutes of exercise per week 0       Assessment of Health Literacy    How often do you have someone help you read hospital materials? Always    How often do you have problems learning about your medical condition because of difficulty understanding written information? Always    How often do you have a problem understanding what is told to you about your medical condition? Always    How confident are you filling out medical forms by yourself? Not at all    Health Literacy Low       Functional Status, IADL    Medications completely dependent    Meal Preparation completely dependent    Housekeeping completely dependent    Laundry completely dependent    Shopping completely dependent       Mental Status    General Appearance WDL WDL       Mental Status Summary    Mental Status Comments Pt has a history of dementia. Is currently nonresponsive on ventilator secondary to cardiac arrest.                   Psychosocial    No documentation.                  Abuse/Neglect    No documentation.                  Legal    No documentation.                  Substance Abuse    No documentation.                  Patient Forms    No documentation.                     Naeem Ordoñez RN

## 2024-09-25 NOTE — PROGRESS NOTES
"Dietitian Assessment    Patient Name: Annabel Hyman  YOB: 1940  MRN: [id number]  Admission date: 9/24/2024    Comment:      Clinical Nutrition Assessment      Reason for Assessment MST   H&P  Past Medical History:   Diagnosis Date    Dysphagia     Hypertension     Stroke 2013       History reviewed. No pertinent surgical history.         Current Problems   ANDERS  Cardiac arrest  Transaminitis  Bacterial colitis     Encounter Information        Trending Narrative     9/25: Patient with MST score of 2 - unsure of recent weight loss d/t chart review. Patient currently NPO w/ NG-tube - intubated and sedated. Propofol ordered 1.42-14.22mL/hr providing 38-375kcals/day. MAP is 121mmHg and lactate 3.1mmol/L. Will continue to follow-up and monitor.      Anthropometrics        Current Height, Weight Height: 139.7 cm (55\")  Weight: 47.4 kg (104 lb 8 oz) (09/25/24 0600)   Trending Weight Hx     This admission:              PTA:     Wt Readings from Last 30 Encounters:   09/25/24 0600 47.4 kg (104 lb 8 oz)   09/24/24 2315 47.4 kg (104 lb 8 oz)   09/24/24 1808 49 kg (108 lb)      BMI kg/m2 Body mass index is 24.29 kg/m².     Labs        Pertinent Labs     Results from last 7 days   Lab Units 09/25/24  0412 09/24/24  1828   SODIUM mmol/L 150* 143   POTASSIUM mmol/L 3.1* 5.3*   CHLORIDE mmol/L 104 104   CO2 mmol/L 30.8* 13.8*   BUN mg/dL 39* 28*   CREATININE mg/dL 1.23* 1.46*   CALCIUM mg/dL 9.4 9.6   BILIRUBIN mg/dL 0.4 0.3   ALK PHOS U/L 231* 135*   ALT (SGPT) U/L 235* 152*   AST (SGOT) U/L 339* 197*   GLUCOSE mg/dL 215* 240*       Results from last 7 days   Lab Units 09/25/24  0412 09/24/24  1828   MAGNESIUM mg/dL 3.0* 2.2   PHOSPHORUS mg/dL 3.3 8.0*   HEMOGLOBIN g/dL 9.5* 9.6*   HEMATOCRIT % 29.5* 33.3*       No results found for: \"HGBA1C\"         Medications       Scheduled Medications chlorhexidine, 15 mL, Mouth/Throat, Q12H  enoxaparin, 30 mg, Subcutaneous, Q24H  fosphenytoin, 1,000 mg PE, Intravenous, " Once  levETIRAcetam in NaCl 0.54%, 1,500 mg, Intravenous, Q15 Min  pantoprazole, 40 mg, Intravenous, Q24H  piperacillin-tazobactam, 4.5 g, Intravenous, Q8H  senna-docusate sodium, 2 tablet, Nasogastric, BID  sodium chloride, 10 mL, Intravenous, Q12H        Infusions dexmedetomidine, 0.5 mcg/kg/hr, Last Rate: Stopped (09/25/24 0618)  dextrose 5 % with KCl 20 mEq, 100 mL/hr, Last Rate: 100 mL/hr (09/25/24 0848)  norepinephrine, 0.02-0.3 mcg/kg/min, Last Rate: Stopped (09/25/24 0629)  Pharmacy to Dose enoxaparin (LOVENOX),   Pharmacy to Dose Zosyn,   propofol, 5-50 mcg/kg/min         PRN Medications   senna-docusate sodium **AND** polyethylene glycol **AND** [DISCONTINUED] bisacodyl **AND** bisacodyl    nitroglycerin    Pharmacy to Dose enoxaparin (LOVENOX)    Pharmacy to Dose Zosyn    sodium chloride    sodium chloride     Physical Findings        Trending Physical   Appearance, NFPE    --  Edema  None reported    Bowel Function 9/24   Tubes CVC  Peripheral IV   NG   Chewing/Swallowing NPO   Skin WNL      Estimated/Assessed Needs       Energy Requirements    EST Needs, Method, Wt used 1175-1410kcals/day using 25-30kcals/kg       Protein Requirements    EST Needs, Method, Wt used 47-56g protein per day using 1-1.2g/kg       Fluid Requirements     Estimated Needs (mL/day) 1410mL per day        Current Nutrition Orders & Evaluation of Intake       Oral Nutrition     Food Allergies    Current PO Diet NPO Diet NPO Type: Strict NPO   Supplement    PO Evaluation     Trending % PO Intake      Enteral Nutrition    Enteral Route    Order, Modulars, Flushes    Residual/Tolerance    TF Observation         Parenteral Nutrition     TPN Route    Total # Days on TPN    TPN Order, Lipid Details    MVI & Trace Element Freq    TPN Observation       Nutrition Diagnosis         Nutrition Dx Problem 1 Needs alternate r/t NPO diet regimen as evidenced by patient with NG-tube and need for enteral nutrition when medically appropriate.      Nutrition Dx Problem 2        Intervention Goal         Intervention Goal(s) Initiation of enteral nutrition when medically appropriate  Maintain current body weight      Nutrition Intervention        RD Action Will continue to follow-up and monitor     Nutrition Prescription          Diet Prescription NPO   Supplement Prescription      Enteral Prescription        TPN Prescription      Monitor/Evaluation        Monitor Per protocol, I&O, Pertinent labs, Weight, Skin status, GI status, Symptoms, POC/GOC, Hemodynamic stability     RD to follow-up.    Electronically signed by:  Chel Zaragoza RD  09/25/24 10:08 EDT

## 2024-09-25 NOTE — PLAN OF CARE
Goal Outcome Evaluation:           Progress: no change  Outcome Evaluation: Patient admitted after cardiac arrest, patient withdraws from pain, has a cough and corneal reflex, patient has no rectal tone, she vomitted once on arrival to unit, improving BP and have been able to turn down levo, family at bedside,

## 2024-09-25 NOTE — PLAN OF CARE
Goal Outcome Evaluation:  Plan of Care Reviewed With: family           Outcome Evaluation: Patient withdraws from painful stimuli but unable to follow commands with sedation paused. Propofol initiated and patient resting well. Patient had several bouts of emesis this morning, NG tube exchanged for larger size and no more vomiting noted. Thick NG output constant to wall suction. patient turned per protocol and skin intact at this time. Family at bedside and updated on plan of care throughout the day. Family will meet with palliative care team tomorrow at 11AM.

## 2024-09-25 NOTE — PLAN OF CARE
Problem: Inability to Wean (Mechanical Ventilation, Invasive)  Goal: Mechanical Ventilation Liberation  Outcome: Progressing     Problem: Ventilator-Induced Lung Injury (Mechanical Ventilation, Invasive)  Goal: Absence of Ventilator-Induced Lung Injury  Outcome: Progressing   Goal Outcome Evaluation:

## 2024-09-25 NOTE — SIGNIFICANT NOTE
Palliative consulted to further review GOC.  I met with family at bedside, family meeting to further discuss GOC slated for 9/26 @1100. Contact information provided to family for any additional questions/concerns prior to discussions.

## 2024-09-25 NOTE — PROGRESS NOTES
RT EQUIPMENT DEVICE RELATED - SKIN ASSESSMENT    Blake Score:  Blake Score: 10     RT Medical Equipment/Device:     ETT Gates/Anchorfast    Skin Assessment:      Cheek:  Intact    Device Skin Pressure Protection:  Pressure points protected    Nurse Notification:  Zhane Urias, CRT

## 2024-09-25 NOTE — PLAN OF CARE
Problem: Inability to Wean (Mechanical Ventilation, Invasive)  Goal: Mechanical Ventilation Liberation  Outcome: Progressing   Goal Outcome Evaluation: Actively trying to wean patient.    RT EQUIPMENT DEVICE RELATED - SKIN ASSESSMENT    RT Medical Equipment/Device:  ETT Gates/Anchorfast    Skin Assessment: Cheek: Intact    Device Skin Pressure Protection: Skin-to-device areas padded: None required    Nurse Notification: Zhane Graff, RRT

## 2024-09-25 NOTE — ED PROVIDER NOTES
EMERGENCY DEPARTMENT ENCOUNTER    Pt Name: Annabel Hyman  MRN: [id number]  Pt :   1940  Room Number:    Date of encounter:  2024  PCP: Provider, No Known  ED Provider: Jeffrey Yan MD    Historian: Patient      HPI:  Chief Complaint   Patient presents with    Cardiac Arrest          Context: Annabel Hyman is a 84 y.o. female who presents to the ED c/o cardiac arrest, the patient was apparently eating dinner, choked on a strawberry and lost pulses.  EMS arrived, intubated the patient, started ACLS and transfer the patient hospital, on arrival the patient remains pulseless, with a ET tube in place and Matthew providing chest compressions.  Patient and providing history.  Quick downtime on arrival, 1 epi so far.      PAST MEDICAL HISTORY  No past medical history on file.      PAST SURGICAL HISTORY  No past surgical history on file.      FAMILY HISTORY  No family history on file.      SOCIAL HISTORY  Social History     Socioeconomic History    Marital status:          ALLERGIES  Patient has no known allergies.        REVIEW OF SYSTEMS  Review of Systems   Unable to perform ROS: Patient unresponsive        All systems reviewed and negative except for those discussed in HPI.       PHYSICAL EXAM    I have reviewed the triage vital signs and nursing notes.    ED Triage Vitals   Temp Heart Rate Resp BP SpO2   24 17524 1755 24 1909 24 17524 1759   99.2 °F (37.3 °C) (!) 121 24 138/56 (!) 89 %      Temp src Heart Rate Source Patient Position BP Location FiO2 (%)   24 1755 24 1755 24 1755 24 1755 24 1805   Rectal Monitor Lying Left arm 100 %       Physical Exam  Constitutional:       Comments: Unresponsive elderly female, being bagged, with Matthew in place   HENT:      Head: Normocephalic and atraumatic.      Right Ear: External ear normal.      Left Ear: External ear normal.      Nose: Nose normal.      Mouth/Throat:       Mouth: Mucous membranes are moist.      Pharynx: Oropharynx is clear.   Eyes:      Comments: Pupils are fixed and dilated bilaterally   Cardiovascular:      Comments: No heart sounds present, no pulses  Pulmonary:      Comments: Transmitted ventilatory sounds with bagging  Abdominal:      General: Abdomen is flat.      Palpations: Abdomen is soft.   Musculoskeletal:      Cervical back: Normal range of motion and neck supple.      Comments: No acute deformity   Skin:     General: Skin is warm.      Comments: Mottled bilaterally   Neurological:      Comments: Patient is unresponsive, does not complete neurologic exam.            LAB RESULTS  Recent Results (from the past 24 hour(s))   Blood Gas, Arterial With Co-Ox    Collection Time: 09/24/24  5:59 PM    Specimen: Arterial Blood   Result Value Ref Range    Site IVC     Prasanth's Test N/A     pH, Arterial <6.767 (C) 7.300 - 7.500 pH units    pCO2, Arterial 70.5 (C) 35.0 - 45.0 mm Hg    pO2, Arterial 113.0 (H) 75.0 - 100.0 mm Hg    HCO3, Arterial 9.6 (L) 22.0 - 28.0 mmol/L    Base Excess, Arterial -25.3 (L) 0.0 - 2.0 mmol/L    O2 Saturation, Arterial 89.6 (L) 94.0 - 100.0 %    Hematocrit, Blood Gas 29.6 %    Oxyhemoglobin 88.2 (L) 94 - 99 %    Methemoglobin 1.20 0.00 - 1.50 %    Carboxyhemoglobin 0.3 0 - 2 %    A-a DO2 499.2 mmHg    Barometric Pressure for Blood Gas 729 mmHg    Modality Ambu     FIO2 100 %    Ventilator Mode NA     Notified Who MD     Notified By [id number]     Notified Time 09/24/2024 18:03     Collected by LESLYE     pH, Temp Corrected      pCO2, Temperature Corrected      pO2, Temperature Corrected     POC Glucose Once    Collection Time: 09/24/24  5:59 PM    Specimen: Blood   Result Value Ref Range    Glucose 243 (H) 70 - 130 mg/dL   Comprehensive Metabolic Panel    Collection Time: 09/24/24  6:28 PM    Specimen: Blood   Result Value Ref Range    Glucose 240 (H) 65 - 99 mg/dL    BUN 28 (H) 8 - 23 mg/dL    Creatinine 1.46 (H) 0.57 - 1.00 mg/dL    Sodium 143  136 - 145 mmol/L    Potassium 5.3 (H) 3.5 - 5.2 mmol/L    Chloride 104 98 - 107 mmol/L    CO2 13.8 (L) 22.0 - 29.0 mmol/L    Calcium 9.6 8.6 - 10.5 mg/dL    Total Protein 7.0 6.0 - 8.5 g/dL    Albumin 3.6 3.5 - 5.2 g/dL    ALT (SGPT) 152 (H) 1 - 33 U/L    AST (SGOT) 197 (H) 1 - 32 U/L    Alkaline Phosphatase 135 (H) 39 - 117 U/L    Total Bilirubin 0.3 0.0 - 1.2 mg/dL    Globulin 3.4 gm/dL    A/G Ratio 1.1 g/dL    BUN/Creatinine Ratio 19.2 7.0 - 25.0    Anion Gap 25.2 (H) 5.0 - 15.0 mmol/L    eGFR 34.9 (L) >60.0 mL/min/1.73   Protime-INR    Collection Time: 09/24/24  6:28 PM    Specimen: Blood   Result Value Ref Range    Protime 16.9 (H) 12.3 - 15.1 Seconds    INR 1.32 (H) 0.90 - 1.10   aPTT    Collection Time: 09/24/24  6:28 PM    Specimen: Blood   Result Value Ref Range    PTT 38.3 (H) 23.0 - 35.0 seconds   Magnesium    Collection Time: 09/24/24  6:28 PM    Specimen: Blood   Result Value Ref Range    Magnesium 2.2 1.6 - 2.4 mg/dL   Phosphorus    Collection Time: 09/24/24  6:28 PM    Specimen: Blood   Result Value Ref Range    Phosphorus 8.0 (H) 2.5 - 4.5 mg/dL   Lactic Acid, Plasma    Collection Time: 09/24/24  6:28 PM    Specimen: Blood   Result Value Ref Range    Lactate 14.1 (C) 0.5 - 2.0 mmol/L   C-reactive Protein    Collection Time: 09/24/24  6:28 PM    Specimen: Blood   Result Value Ref Range    C-Reactive Protein 0.45 0.00 - 0.50 mg/dL   CBC Auto Differential    Collection Time: 09/24/24  6:28 PM    Specimen: Blood   Result Value Ref Range    WBC 12.31 (H) 3.40 - 10.80 10*3/mm3    RBC 3.53 (L) 3.77 - 5.28 10*6/mm3    Hemoglobin 9.6 (L) 12.0 - 15.9 g/dL    Hematocrit 33.3 (L) 34.0 - 46.6 %    MCV 94.3 79.0 - 97.0 fL    MCH 27.2 26.6 - 33.0 pg    MCHC 28.8 (L) 31.5 - 35.7 g/dL    RDW 17.9 (H) 12.3 - 15.4 %    RDW-SD 62.4 (H) 37.0 - 54.0 fl    MPV 11.8 6.0 - 12.0 fL    Platelets 250 140 - 450 10*3/mm3   Scan Slide    Collection Time: 09/24/24  6:28 PM    Specimen: Blood   Result Value Ref Range    Scan Slide      Manual Differential    Collection Time: 09/24/24  6:28 PM    Specimen: Blood   Result Value Ref Range    Neutrophil % 33.0 (L) 42.7 - 76.0 %    Lymphocyte % 40.0 19.6 - 45.3 %    Monocyte % 6.0 5.0 - 12.0 %    Eosinophil % 5.0 0.3 - 6.2 %    Bands %  1.0 0.0 - 5.0 %    Atypical Lymphocyte % 15.0 (H) 0.0 - 5.0 %    Neutrophils Absolute 4.19 1.70 - 7.00 10*3/mm3    Lymphocytes Absolute 6.77 (H) 0.70 - 3.10 10*3/mm3    Monocytes Absolute 0.74 0.10 - 0.90 10*3/mm3    Eosinophils Absolute 0.62 (H) 0.00 - 0.40 10*3/mm3    Acanthocytes Slight/1+ None Seen    Karina Cells Mod/2+ None Seen    Hypochromia Slight/1+ None Seen    WBC Morphology Normal Normal    Platelet Estimate Adequate Normal   Green Top (Gel)    Collection Time: 09/24/24  6:29 PM   Result Value Ref Range    Extra Tube Hold for add-ons.    Lavender Top    Collection Time: 09/24/24  6:29 PM   Result Value Ref Range    Extra Tube hold for add-on    Gold Top - SST    Collection Time: 09/24/24  6:29 PM   Result Value Ref Range    Extra Tube Hold for add-ons.    Light Blue Top    Collection Time: 09/24/24  6:29 PM   Result Value Ref Range    Extra Tube Hold for add-ons.    Blood Gas, Arterial With Co-Ox    Collection Time: 09/24/24  7:49 PM    Specimen: Arterial Blood   Result Value Ref Range    Site Right Radial     Prasanth's Test Positive     pH, Arterial 7.362 7.300 - 7.500 pH units    pCO2, Arterial 32.8 (L) 35.0 - 45.0 mm Hg    pO2, Arterial 452.0 (H) 75.0 - 100.0 mm Hg    HCO3, Arterial 18.6 (L) 22.0 - 28.0 mmol/L    Base Excess, Arterial -6.1 (L) 0.0 - 2.0 mmol/L    O2 Saturation, Arterial 98.5 94.0 - 100.0 %    Hematocrit, Blood Gas 30.0 %    Oxyhemoglobin 98.1 94 - 99 %    Methemoglobin 0.80 0.00 - 1.50 %    Carboxyhemoglobin <0.0 (L) 0 - 2 %    A-a DO2 198.0 mmHg    Barometric Pressure for Blood Gas 730 mmHg    Modality Ventilator     FIO2 100 %    Ventilator Mode AC     Set Tidal Volume 320     Set Mech Resp Rate 20.0     PEEP 5.0     Collected by ANTHONY      pH, Temp Corrected      pCO2, Temperature Corrected      pO2, Temperature Corrected     Urinalysis With Microscopic If Indicated (No Culture) - Urine, Catheter    Collection Time: 09/24/24  8:40 PM    Specimen: Urine, Catheter   Result Value Ref Range    Color, UA Yellow Yellow, Straw    Appearance, UA Cloudy (A) Clear    pH, UA 6.0 5.0 - 8.0    Specific Gravity, UA 1.019 1.005 - 1.030    Glucose, UA Negative Negative    Ketones, UA Negative Negative    Bilirubin, UA Negative Negative    Blood, UA Moderate (2+) (A) Negative    Protein,  mg/dL (2+) (A) Negative    Leuk Esterase, UA Small (1+) (A) Negative    Nitrite, UA Negative Negative    Urobilinogen, UA 0.2 E.U./dL 0.2 - 1.0 E.U./dL   Urinalysis, Microscopic Only - Urine, Catheter    Collection Time: 09/24/24  8:40 PM    Specimen: Urine, Catheter   Result Value Ref Range    RBC, UA 11-20 (A) None Seen, 0-2 /HPF    WBC, UA 6-10 (A) None Seen, 0-2 /HPF    Bacteria, UA 1+ (A) None Seen /HPF    Squamous Epithelial Cells, UA 7-12 (A) None Seen, 0-2 /HPF    Hyaline Casts, UA None Seen None Seen /LPF    Amorphous Crystals, UA Small/1+ None Seen /HPF    Methodology Manual Light Microscopy        If labs were ordered, I independently reviewed the results and considered them in treating the patient.        RADIOLOGY  CT Abdomen Pelvis With Contrast    Result Date: 9/24/2024  FINAL REPORT TECHNIQUE: null CLINICAL HISTORY: cardiac arrest COMPARISON: null FINDINGS: Exam: CT Abdomen and Pelvis with contrast Comparison: None Clinical history: Cardiac arrest Findings: NG tube tip in the stomach. Fluid in the distal esophagus may be reflective of gastroesophageal reflux. Stomach is distended with fluid. Mild bibasilar atelectasis. Liver does not demonstrate any focal lesions. Gallbladder not identified and is likely surgically absent. Enlargement of the Intrahepatic biliary ducts, common hepatic duct, common bile duct and pancreatic duct. No choledocholithiasis identified.  Findings may be due to pancreatic head lesion, ampullary stricture/lesion, sphincter dysfunction or post cholecystectomy status. No pancreatic head lesion identified on today's exam. Spleen has a normal appearance. No acute pancreatitis. Normal adrenal glands. No renal stones. Symmetric enhancement of the kidneys bilaterally. No CT evidence for pyelonephritis. No abdominal aortic aneurysm or dissection. Atherosclerotic vascular disease noted. . No small bowel obstruction or ileus. The cecum ascending colon and transverse colon are distended with gas, fluid and stool. Very large amount of stool in the rectum with rectum distended to 8.7 x 9 x 16.4 cm. Mild rectal wall thickening with perirectal inflammation reflective of a stercoral colitis. The urinary bladder and uterus are deviated ventrally secondary to the very large amount of stool in the rectum. Urinary bladder is decompressed with a Butler catheter.. Trace amount of free fluid in the deep pelvis. L4 compression fracture with 1/3 loss of vertebral body height. Mild L5 compression fracture with 25% loss of vertebral body height. Hemangioma at L2. Bilateral L5 pars defects with grade 1 anterior subluxation. Degenerative disc and endplate disease at L4-5 and L5-S1. Mild bilateral hip arthritis.     Impression: 1. Very large amount of stool in the rectum with rectum distended to 8.7 x 9 x 16.4 cm. Mild rectal wall thickening with perirectal inflammation reflective of a stercoral colitis. The cecum ascending colon and transverse colon are distended with gas, fluid and stool. Moderate stool in the left colon. 2. NG tube tip in the stomach. Fluid in the distal esophagus may be reflective of gastroesophageal reflux. Stomach is distended with fluid. 3. Gallbladder not identified and is likely surgically absent. 4. Intra and extrahepatic biliary dilatation with no choledocholithiasis identified. Findings may be due to pancreatic head lesion, ampullary stricture/lesion,  sphincter dysfunction or post cholecystectomy status. No pancreatic head lesion identified on today's exam. MR/MRCP could be considered for further evaluation. 5. Urinary bladder is decompressed with a Butler catheter.. 6. L4 and L5 compression fractures of indeterminate age. Please correlate with physical exam. Bilateral L5 pars defects. Degenerative disease of the lumbar spine. Authenticated and Electronically Signed by Sarah Peralta MD on 09/24/2024 09:29:37 PM    CT Angiogram Chest    Result Date: 9/24/2024  FINAL REPORT TECHNIQUE: null CLINICAL HISTORY: respiratory arrest COMPARISON: null FINDINGS: Exam: CTA Chest with IV contrast. Procedure: Coronal and sagittal MIP reformats were performed Comparison: None Clinical history: Cardiac arrest Findings: No pulmonary emboli. No thoracic aortic aneurysm or dissection. No hilar or mediastinal adenopathy. No pericardial fluid collection. No pleural fluid collection. Acute fractures of the left 5, 6 and 7 anterior ribs. Acute fractures of the right anterior 5 and 6 ribs. No pneumothorax. Mild bibasilar atelectasis. The right upper arm is only partially visualized. Low-density ill-defined fluid and air seen in the soft tissues of the right upper arm. T3 compression fracture with 10% loss of vertebral body height. Age of the fracture is indeterminate. T12 compression fracture with 25% loss of vertebral body height. Age of the fracture is indeterminate.     Impression: 1. No pulmonary emboli. 2. No thoracic aortic aneurysm or dissection 3. Acute fractures of the left 5th-7th anterior ribs and right anterior 6th and 7th ribs. No pneumothorax. Mild bibasilar atelectasis. 4. The right upper arm is only partially visualized. Low-density ill-defined fluid and air seen in the soft tissues of the right upper arm. Please correlate clinically. 5. T3 and T12 compression fractures of indeterminate age. Please correlate with physical exam. MRI could be considered for further  evaluation. Authenticated and Electronically Signed by Sarah Peralta MD on 09/24/2024 09:21:12 PM    CT Head Without Contrast    Result Date: 9/24/2024  FINAL REPORT TECHNIQUE: null CLINICAL HISTORY: post code COMPARISON: null FINDINGS: CT HEAD WITHOUT CONTRAST Comparison: None Findings: There is motion artifact. No acute intracranial hemorrhage, extra-axial fluid collection, hydrocephalus or midline shift. Age appropriate generalized parenchymal atrophy. There are periventricular and subcortical white matter hypodensities which are nonspecific but most likely related to microangiopathic gliosis. Intracranial arteriosclerosis. There is no sinus or mastoid fluid. Visualized orbits: No acute abnormalities. There is no acute fracture.     IMPRESSION: 1. No acute intracranial process. Authenticated and Electronically Signed by Becki Franklin DO on 09/24/2024 09:01:33 PM    XR Chest 1 View    Result Date: 9/24/2024  FINAL REPORT TECHNIQUE: null CLINICAL HISTORY: Severe Sepsis triage protocol ET and central line placement COMPARISON: null FINDINGS: 1 view chest x-ray Comparison: None Findings: Endotracheal tube distal tip is positioned 2.5 cm superior to the claude. A left central line crosses midline with distal tip terminating at the level of the right atrium. A percutaneous pacer overlies the superior lateral right hemithorax. Linear scarring or atelectasis involving the left lung base. A large granuloma of the periphery of the mid left lung measures 1.4 cm. No focal consolidation or large pleural effusion. No pneumothorax. The heart size is normal. Retrocardiac atelectasis. No acute fractures. The stomach lumen is gas-filled and dilated.     IMPRESSION: 1. Endotracheal tube terminates 2.5 cm superior to the claude. Left central line crosses midline with distal tip overlying the expected location of the right atrium. 2. Likely scarring or atelectasis of the left lung base including the retrocardiac region without  acute process. Authenticated and Electronically Signed by Anthony Egan DO on 09/24/2024 07:13:52 PM         PROCEDURES    Critical Care    Performed by: Jeffrey Yan MD  Authorized by: Jeffrey Yan MD    Critical care provider statement:     Critical care time (minutes):  96    Critical care time was exclusive of:  Separately billable procedures and treating other patients    Critical care was necessary to treat or prevent imminent or life-threatening deterioration of the following conditions:  Shock, cardiac failure, circulatory failure and respiratory failure    Critical care was time spent personally by me on the following activities:  Ordering and performing treatments and interventions, ordering and review of laboratory studies, ordering and review of radiographic studies, blood draw for specimens, development of treatment plan with patient or surrogate, pulse oximetry, re-evaluation of patient's condition, review of old charts, discussions with consultants, examination of patient and ventilator management    I assumed direction of critical care for this patient from another provider in my specialty: no      Care discussed with: admitting provider    Central Line At Bedside    Date/Time: 9/24/2024 8:43 PM    Performed by: Jeffrey Yan MD  Authorized by: Jeffrey Yan MD    Consent:     Consent obtained:  Emergent situation    Risks, benefits, and alternatives were discussed: yes    Pre-procedure details:     Indication(s): central venous access and insufficient peripheral access      Hand hygiene: Hand hygiene performed prior to insertion      Sterile barrier technique: All elements of maximal sterile technique followed      Skin preparation:  Chlorhexidine    Skin preparation agent: Skin preparation agent completely dried prior to procedure    Sedation:     Sedation type:  None  Anesthesia:     Anesthesia method:  Local infiltration    Local anesthetic:   Lidocaine 1% w/o epi  Procedure details:     Location:  L subclavian    Site selection rationale:  Groin soiled, IJ not accessible    Patient position:  Supine    Procedural supplies:  Triple lumen    Catheter size:  7 Fr    Landmarks identified: yes      Ultrasound guidance: no      Number of attempts:  1    Successful placement: no    Post-procedure details:     Post-procedure:  Dressing applied    Procedure completion:  Procedure terminated electively by provider    Complications:  Arterial puncture  Central Line At Bedside    Date/Time: 9/24/2024 8:44 PM    Performed by: Jeffrey Yan MD  Authorized by: Jeffrey Yan MD    Consent:     Consent obtained:  Emergent situation  Pre-procedure details:     Indication(s): central venous access and insufficient peripheral access      Hand hygiene: Hand hygiene performed prior to insertion      Sterile barrier technique: All elements of maximal sterile technique followed      Skin preparation:  Chlorhexidine  Sedation:     Sedation type:  None  Anesthesia:     Anesthesia method:  Local infiltration    Local anesthetic:  Lidocaine 1% w/o epi  Procedure details:     Location:  L internal jugular    Patient position:  Supine    Procedural supplies:  Triple lumen    Catheter size:  7 Fr    Landmarks identified: yes      Ultrasound guidance: yes      Ultrasound guidance timing: real time      Sterile ultrasound techniques: Sterile gel and sterile probe covers were used      Number of attempts:  1    Successful placement: yes    Post-procedure details:     Post-procedure:  Dressing applied    Assessment:  Blood return through all ports, free fluid flow, placement verified by x-ray and no pneumothorax on x-ray    Procedure completion:  Tolerated well, no immediate complications      Interpretations    O2 Sat: The patients oxygen saturation was 100% on Ventilator .  This was independently interpreted by me as Normal    EKG: I reviewed and independently  interpreted the EKG as sinus tach rate of 120, normal axis, normal intervals, no ST elevation, no T wave inversions    Cardiac Monitoring: I reviewed and independently interpreted the Rhythm Strip as Sinus Tachycardia rate of 120 (following ROSC)    Radiology: I ordered and independently reviewed the above noted radiographic studies.  I viewed images of CT Head which showed No intracranial hemorrhage per my independent interpretation. See radiologist's dictation for official interpretation.     Radiology: I ordered and independently reviewed the above noted radiographic studies.  I viewed images of CT Abdomen/Pelvis and CT Chest which showed rib fractures, constipation per my independent interpretation. See radiologist's dictation for official interpretation        MEDICATIONS GIVEN IN ER    Medications   sodium chloride 0.9 % flush 10 mL (has no administration in time range)   dexmedetomidine (PRECEDEX) 400 mcg in 100 mL NS infusion (0.5 mcg/kg/hr × 49 kg Intravenous Currently Infusing 9/24/24 2112)   sodium bicarbonate 8.4 % 150 mEq in sodium chloride 0.45 % 1,000 mL infusion (greater than 100 mEq) ( Intravenous Currently Infusing 9/24/24 2108)   norepinephrine (LEVOPHED) 8 mg in 250mL D5W infusion (0.04 mcg/kg/min × 49 kg Intravenous Rate/Dose Change 9/24/24 2106)   midazolam (VERSED) 100 mg in 100mL NS infusion (2 mg/hr Intravenous Currently Infusing 9/24/24 2110)   Midazolam HCl (PF) (VERSED) injection 2 mg (2 mg Intravenous Given 9/24/24 1814)   EPINEPHrine (ADRENALIN) injection (1 mg Intravenous Given 9/24/24 1748)   sodium bicarbonate injection 8.4% (50 mEq Intravenous Given 9/24/24 1812)   calcium chloride injection (1 g Intravenous Given 9/24/24 1806)   magnesium sulfate injection (2 g Intravenous Given 9/24/24 1807)   Midazolam HCl (PF) (VERSED) injection (2 mg Intravenous Given 9/24/24 1812)   iopamidol (ISOVUE-300) 61 % injection 100 mL (100 mL Intravenous Given 9/24/24 2033)   Midazolam HCl (PF)  (VERSED) injection 2 mg (2 mg Intravenous Given 9/24/24 1950)         MEDICAL DECISION MAKING, PROGRESS, and CONSULTS    All labs, if obtained, have been independently reviewed by me.  All radiology studies, if obtained, have been reviewed by me and the radiologist dictating the report.  All EKG's, if obtained, have been independently viewed and interpreted by me      Discussion below represents my analysis of pertinent findings related to patient's condition, differential diagnosis, treatment plan and final disposition.      Differential diagnosis:    84-year-old female presents to the ED as a full cardiopulmonary arrest, the patient is intubated, receiving compressions upon arrival.  Sounds to be pure respiratory arrest secondary to choking.  1 round of epinephrine given, patient continued on Matthew, until end-tidal CO2 idalmis above 30 at which point we held compressions, bedside ultrasound showed cardiac activity with good valvular opening, patient palpable pulse.  Immediate ABG showed severe acidosis, bicarb given, bicarb drip started.  Central line placed, will obtain CT head, chest and abdomen.  Obtain broad laboratory workup.    Additional Sources:  External (non-ED) record review:  Not available for review       Orders placed during this visit:  Orders Placed This Encounter   Procedures    Critical Care    Insert Central Line At Bedside    Insert Central Line At Bedside    Blood Culture - Blood,    Blood Culture - Blood,    XR Chest 1 View    CT Head Without Contrast    CT Angiogram Chest    XR Abdomen KUB    CT Abdomen Pelvis With Contrast    Blood Gas, Arterial With Co-Ox    [employer] Draw    Comprehensive Metabolic Panel    Protime-INR    aPTT    Magnesium    Phosphorus    Lactic Acid, Plasma    C-reactive Protein    Blood Gas, Arterial -With Co-Ox Panel: Yes    Urinalysis With Microscopic If Indicated (No Culture) - Urine, Catheter    CBC Auto Differential    Scan Slide    STAT Lactic Acid, Reflex    Manual  Differential    Blood Gas, Arterial With Co-Ox    Urinalysis, Microscopic Only - Urine, Clean Catch    Undress & Gown    Continuous Pulse Oximetry    Measure Blood Pressure    Vital Signs    Target Arousal Level RASS -1 to -2    Oxygen Therapy- Nasal Cannula; Titrate 1-6 LPM Per SpO2; 90 - 95%    Ventilator - Vent Mode: AC/VC+; FiO2: Titrate Per SpO2; Titrate Oxygen for SpO2: 90% or Greater    POC Glucose Once    ECG 12 Lead Other; Sepsis    Insert Large Bore Peripheral IV    Insert 2nd Large Bore Peripheral IV    Inpatient Admission    ED Bed Request    CBC & Differential    Green Top (Gel)    Lavender Top    Gold Top - SST    Light Blue Top         Additional orders considered but not ordered:  None    ED Course:    Consultants:  Hospitalist    ED Course as of 09/24/24 2158   Tue Sep 24, 2024   1817 Patient's initial ABG showed severe acidosis, multiple pushes of bicarb ordered, bicarb drip started [CS]   1828 Lactic Acid, Plasma(!!)  Lactic was significantly elevated, patient is receiving IV fluids for this [CS]   1840 XR Chest 1 View  Chest x-ray shows ET tube in central line in good position, good to use [CS]   2017 Blood Gas, Arterial With Co-Ox(!)  pH is significantly improved [CS]   2018 CBC & Differential(!)  Hemoglobin decreased, no baseline available [CS]   2110 Urinalysis With Microscopic If Indicated (No Culture) - Urine, Catheter(!)  Urine appears likely infected will treat with antibiotics [CS]   2144 Patient's labs are improving, she is sedated, but overbreathing the vent, her CT scans do not show intracranial injury, she has some rib fractures as you might expect, intra-abdominal findings were significant for fecal impaction, will contact the hospitalist for admission at this juncture [CS]   2153 Spoke directly to Dr. Mccormick, he agrees to evaluate the patient for admission [CS]      ED Course User Index  [CS] Jeffrey Yan MD           After my consideration of clinical presentation and  any laboratory/radiology studies obtained, I discussed the findings with the patient/patient representative who is in agreement with the treatment plan and the final disposition. Risks and benefits of admission was discussed     AS OF 21:58 EDT VITALS:    BP - 117/54  HR - 70  TEMP - 99.2 °F (37.3 °C) (Rectal)  O2 SATS - 96%    I reviewed the patients prescription monitoring report if available prior to discharge    DIAGNOSIS  Final diagnoses:   Respiratory arrest   Choking, initial encounter         DISPOSITION  ED Disposition       ED Disposition   Decision to Admit    Condition   --    Comment   Level of Care: Critical Care [6]   Diagnosis: Cardiac arrest [427.5.ICD-9-CM]                     Please note that portions of this document were completed with voice recognition software.        Jeffrey Yan MD  09/24/24 4974

## 2024-09-25 NOTE — PROGRESS NOTES
[facility]    Name:  Annabel Hyman   Age:  84 y.o.  Sex:  female  :  1940  MRN:  [id number]   Visit Number:  [id number]  Admission Date:  2024  Date Of Service:  24  Primary Care Physician:  Provider, No Known     LOS: 1 day :    Chief Complaint:      Follow-up of cardiac arrest and suspected anoxic encephalopathy.    Subjective:    Annabel Hyman was seen and examined this morning.  She is currently on mechanical ventilation and is unresponsive.  She was also noted to have multiple seizure episodes with tonic-clonic convulsions.  Patient was seen by Dr. Kiran from telemetry neurology this morning and he has ordered Keppra and fosphenytoin which is currently being given.  Unfortunately, patient was called/for about 30 minutes prior to ROSC could be obtained.  According to family, patient also turned blue prior to EMS arrival.  Previous physician documentation, laboratory and imaging data have been reviewed.    Hospital Course:    Annabel Hyman is an 84-year-old female, chronically wheelchair-bound, history of CVA, hypertension, Alzheimer dementia was brought to the emergency room by EMS after cardiac arrest at home.  Patient apparently was sitting up and eating a cake while a strawberry got stuck in her throat.  She subsequently became unresponsive and turned blue.  Family started CPR and apparently took about 15 minutes for EMS arrival who continued the CPR with the Matthew device.  Family states that the patient did not turn cyanotic prior to EMS arrival.  Patient was transported to the ER with ongoing CPR and after she was in the ER and another dose of epinephrine, ROSC was obtained.  Patient was intubated and a left IJ central line was placed.  She was initiated on Levophed and was admitted to the ICU.    Initial vitals: Temperature 99.2, pulse 121, blood pressure 138/56 with subsequent drop to 83/49.  Pulse oxygen saturation 89%  on arrival.  Initial ABG pH less than 6.7, pCO2 71, p.o. 213, bicarb 9.6 on 100% Ambu bag.  CMP showed a potassium of 5.3, BUN 28, creatinine 1.46 (unknown baseline), glucose 240, , .  Lactic acid was 14.  C-reactive protein 0.45.  INR 1.32.  WBC 12.3, hemoglobin 9.6.  Chest x-ray showed likely scarring or atelectasis of the left lung base.  Noncontrast CT of the head showed no acute intracranial process.  CT angiogram of the chest was negative for pulmonary embolism or aortic aneurysm.  Showed acute fractures of the left 5-7th anterior ribs and right anterior sixth and seventh ribs.  No pneumothorax.  T3 and T12 compression fractures of indeterminate age noted.  CT of the abdomen and pelvis with contrast showed very large amount of stool in the rectum with mild rectal wall thickening.  Stomach is distended with fluid.  Gallbladder not identified.  Intra and extrahepatic biliary dilatation without choledocholithiasis noted.  L4 and L5 compression fractures of indeterminate age.  Patient was given IV Zosyn and was placed on Precedex, Levophed and was subsequently admitted to the medical ICU.    Patient was seen by Dr. Castro from pulmonology for ventilatory management.  She was also seen by Dr. Kiran for tonic-clonic seizures while on mechanical ventilation.  He recommended Keppra and fosphenytoin.  EEG was ordered.  Patient's blood pressure improved and Levophed was discontinued.  Due to high suspicion of anoxic encephalopathy, palliative care services were consulted.    Review of Systems:     All systems were reviewed and negative except as mentioned in subjective, assessment and plan.    Vital Signs:    Temp:  [97.1 °F (36.2 °C)-99.2 °F (37.3 °C)] 98.1 °F (36.7 °C)  Heart Rate:  [] 74  Resp:  [20-24] 21  BP: ()/() 120/57  FiO2 (%):  [35 %-100 %] 35 %    Intake and output:    I/O last 3 completed shifts:  In: 1669.5 [I.V.:1569.5; IV Piggyback:100]  Out: 1225 [Urine:725;  Stool:500]  I/O this shift:  In: -   Out: 700 [Urine:400; Emesis/NG output:300]    Physical Examination:    General Appearance:  Unresponsive on mechanical ventilation.  Intermittent tonic-clonic convulsions noted.   Head:  Atraumatic and normocephalic.   Eyes: Conjunctivae and sclerae normal, no icterus. No pallor.  Pupils bilaterally equal and reactive to light.   Throat: No oral lesions, no thrush, oral mucosa moist.  Endotracheal tube is in place.  NG tube is in place.   Neck: Supple, trachea midline, no thyromegaly.   Lungs:   Breath sounds heard bilaterally equally.  No wheezing or crackles. No Pleural rub or bronchial breathing.   Heart:  Normal S1 and S2, no murmur, no gallop, no rub. No JVD.   Abdomen:   Normal bowel sounds, no masses, no organomegaly. Soft, nontender, nondistended, no rebound tenderness.  Butler catheter is in place.   Extremities: Supple, no edema, no cyanosis, no clubbing.  Poor muscle mass.   Skin: No bleeding or rash.   Neurologic: Unresponsive.  Currently on mechanical ventilation. No facial asymmetry.  Significant ankle clonus noted bilaterally.   Laboratory results:    Results from last 7 days   Lab Units 09/25/24  0412 09/24/24  1828   SODIUM mmol/L 150* 143   POTASSIUM mmol/L 3.1* 5.3*   CHLORIDE mmol/L 104 104   CO2 mmol/L 30.8* 13.8*   BUN mg/dL 39* 28*   CREATININE mg/dL 1.23* 1.46*   CALCIUM mg/dL 9.4 9.6   BILIRUBIN mg/dL 0.4 0.3   ALK PHOS U/L 231* 135*   ALT (SGPT) U/L 235* 152*   AST (SGOT) U/L 339* 197*   GLUCOSE mg/dL 215* 240*     Results from last 7 days   Lab Units 09/25/24  0412 09/24/24  1828   WBC 10*3/mm3 18.45* 12.31*   HEMOGLOBIN g/dL 9.5* 9.6*   HEMATOCRIT % 29.5* 33.3*   PLATELETS 10*3/mm3 302 250     Results from last 7 days   Lab Units 09/24/24  1828   INR  1.32*       Results from last 7 days   Lab Units 09/24/24  1949   PH, ARTERIAL pH units 7.362   PO2 ART mm Hg 452.0*   PCO2, ARTERIAL mm Hg 32.8*   HCO3 ART mmol/L 18.6*     I have reviewed the patient's  laboratory results.    Radiology results:    XR Abdomen KUB    Result Date: 9/25/2024  PROCEDURE: XR ABDOMEN KUB-  INDICATION:  NG tube insertion verification; R09.2-Respiratory arrest; T17.308A-Unspecified foreign body in larynx causing other injury, initial encounter  COMPARISON: 1 day prior.  FINDINGS:  A supine view of the abdomen was obtained.  The bowel gas pattern is normal.  There are no pathologic calcifications.  No acute osseous abnormality is identified. NG tube has been advanced since the prior exam. Tip and the sideport are now located in the fundus of the stomach. Previously described gaseous distention of the stomach has resolved. Moderate amount of stool identified in the rectosigmoid, fecal impaction not excluded.      Impression: Interval advancement NG tube now in good position with resolution of previous gaseous distention of the stomach..     This report was signed and finalized on 9/25/2024 11:02 AM by Ana Pacheco MD.      XR Abdomen KUB    Result Date: 9/25/2024  PROCEDURE: XR ABDOMEN KUB-  INDICATION:  NG placement; R09.2-Respiratory arrest; T17.308A-Unspecified foreign body in larynx causing other injury, initial encounter  COMPARISON:  None.  FINDINGS:  A supine view of the abdomen was obtained. There is gaseous distention of the stomach as seen on CT earlier the same day. As seen on prior CT there is a NG tube with the tip in the fundus of the stomach. The sideport is located in the distal esophagus; recommend advancement by 4 to 5 cm..  There are no pathologic calcifications.  No acute osseous abnormality is identified. There is evidence of old calcified granulomatous disease. Left internal jugular catheter identified with tip in the right atrium.      Impression: NG tube tip present in the fundus of the stomach with sideport in the distal esophagus; recommend advancement of NG tube by 4 to 5 cm..  Partially visualized left internal jugular catheter.     This report was signed and  finalized on 9/25/2024 9:38 AM by Ana Pacheco MD.      CT Abdomen Pelvis With Contrast    Result Date: 9/24/2024  FINAL REPORT TECHNIQUE: null CLINICAL HISTORY: cardiac arrest COMPARISON: null FINDINGS: Exam: CT Abdomen and Pelvis with contrast Comparison: None Clinical history: Cardiac arrest Findings: NG tube tip in the stomach. Fluid in the distal esophagus may be reflective of gastroesophageal reflux. Stomach is distended with fluid. Mild bibasilar atelectasis. Liver does not demonstrate any focal lesions. Gallbladder not identified and is likely surgically absent. Enlargement of the Intrahepatic biliary ducts, common hepatic duct, common bile duct and pancreatic duct. No choledocholithiasis identified. Findings may be due to pancreatic head lesion, ampullary stricture/lesion, sphincter dysfunction or post cholecystectomy status. No pancreatic head lesion identified on today's exam. Spleen has a normal appearance. No acute pancreatitis. Normal adrenal glands. No renal stones. Symmetric enhancement of the kidneys bilaterally. No CT evidence for pyelonephritis. No abdominal aortic aneurysm or dissection. Atherosclerotic vascular disease noted. . No small bowel obstruction or ileus. The cecum ascending colon and transverse colon are distended with gas, fluid and stool. Very large amount of stool in the rectum with rectum distended to 8.7 x 9 x 16.4 cm. Mild rectal wall thickening with perirectal inflammation reflective of a stercoral colitis. The urinary bladder and uterus are deviated ventrally secondary to the very large amount of stool in the rectum. Urinary bladder is decompressed with a Butler catheter.. Trace amount of free fluid in the deep pelvis. L4 compression fracture with 1/3 loss of vertebral body height. Mild L5 compression fracture with 25% loss of vertebral body height. Hemangioma at L2. Bilateral L5 pars defects with grade 1 anterior subluxation. Degenerative disc and endplate disease at L4-5 and  L5-S1. Mild bilateral hip arthritis.     Impression: Impression: 1. Very large amount of stool in the rectum with rectum distended to 8.7 x 9 x 16.4 cm. Mild rectal wall thickening with perirectal inflammation reflective of a stercoral colitis. The cecum ascending colon and transverse colon are distended with gas, fluid and stool. Moderate stool in the left colon. 2. NG tube tip in the stomach. Fluid in the distal esophagus may be reflective of gastroesophageal reflux. Stomach is distended with fluid. 3. Gallbladder not identified and is likely surgically absent. 4. Intra and extrahepatic biliary dilatation with no choledocholithiasis identified. Findings may be due to pancreatic head lesion, ampullary stricture/lesion, sphincter dysfunction or post cholecystectomy status. No pancreatic head lesion identified on today's exam. MR/MRCP could be considered for further evaluation. 5. Urinary bladder is decompressed with a Butler catheter.. 6. L4 and L5 compression fractures of indeterminate age. Please correlate with physical exam. Bilateral L5 pars defects. Degenerative disease of the lumbar spine. Authenticated and Electronically Signed by Sarah Peralta MD on 09/24/2024 09:29:37 PM    CT Angiogram Chest    Result Date: 9/24/2024  FINAL REPORT TECHNIQUE: null CLINICAL HISTORY: respiratory arrest COMPARISON: null FINDINGS: Exam: CTA Chest with IV contrast. Procedure: Coronal and sagittal MIP reformats were performed Comparison: None Clinical history: Cardiac arrest Findings: No pulmonary emboli. No thoracic aortic aneurysm or dissection. No hilar or mediastinal adenopathy. No pericardial fluid collection. No pleural fluid collection. Acute fractures of the left 5, 6 and 7 anterior ribs. Acute fractures of the right anterior 5 and 6 ribs. No pneumothorax. Mild bibasilar atelectasis. The right upper arm is only partially visualized. Low-density ill-defined fluid and air seen in the soft tissues of the right upper arm.  T3 compression fracture with 10% loss of vertebral body height. Age of the fracture is indeterminate. T12 compression fracture with 25% loss of vertebral body height. Age of the fracture is indeterminate.     Impression: Impression: 1. No pulmonary emboli. 2. No thoracic aortic aneurysm or dissection 3. Acute fractures of the left 5th-7th anterior ribs and right anterior 6th and 7th ribs. No pneumothorax. Mild bibasilar atelectasis. 4. The right upper arm is only partially visualized. Low-density ill-defined fluid and air seen in the soft tissues of the right upper arm. Please correlate clinically. 5. T3 and T12 compression fractures of indeterminate age. Please correlate with physical exam. MRI could be considered for further evaluation. Authenticated and Electronically Signed by Sarah Peralta MD on 09/24/2024 09:21:12 PM    CT Head Without Contrast    Result Date: 9/24/2024  FINAL REPORT TECHNIQUE: null CLINICAL HISTORY: post code COMPARISON: null FINDINGS: CT HEAD WITHOUT CONTRAST Comparison: None Findings: There is motion artifact. No acute intracranial hemorrhage, extra-axial fluid collection, hydrocephalus or midline shift. Age appropriate generalized parenchymal atrophy. There are periventricular and subcortical white matter hypodensities which are nonspecific but most likely related to microangiopathic gliosis. Intracranial arteriosclerosis. There is no sinus or mastoid fluid. Visualized orbits: No acute abnormalities. There is no acute fracture.     Impression: IMPRESSION: 1. No acute intracranial process. Authenticated and Electronically Signed by Becki Franklin DO on 09/24/2024 09:01:33 PM    XR Chest 1 View    Result Date: 9/24/2024  FINAL REPORT TECHNIQUE: null CLINICAL HISTORY: Severe Sepsis triage protocol ET and central line placement COMPARISON: null FINDINGS: 1 view chest x-ray Comparison: None Findings: Endotracheal tube distal tip is positioned 2.5 cm superior to the claude. A left central  line crosses midline with distal tip terminating at the level of the right atrium. A percutaneous pacer overlies the superior lateral right hemithorax. Linear scarring or atelectasis involving the left lung base. A large granuloma of the periphery of the mid left lung measures 1.4 cm. No focal consolidation or large pleural effusion. No pneumothorax. The heart size is normal. Retrocardiac atelectasis. No acute fractures. The stomach lumen is gas-filled and dilated.     Impression: IMPRESSION: 1. Endotracheal tube terminates 2.5 cm superior to the claude. Left central line crosses midline with distal tip overlying the expected location of the right atrium. 2. Likely scarring or atelectasis of the left lung base including the retrocardiac region without acute process. Authenticated and Electronically Signed by Anthony Egan DO on 09/24/2024 07:13:52 PM   I have reviewed the patient's radiology reports.    Medication Review:     I have reviewed the patient's active and prn medications.     Problem List:      Cardiac arrest    Acute respiratory failure with hypoxia and hypercapnia    Aspiration pneumonitis    Metabolic acidosis    ANDERS (acute kidney injury)    Ischemic hepatitis    Stercoral colitis    Assessment:    Cardiac arrest at home with prolonged CPR, POA.  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  Acute hypoxic and hypercapnic respiratory failure likely secondary to #2.  Aspiration pneumonitis, POA.  Acute renal failure, POA.  Acute ischemic hepatitis (shock liver), POA.  Constipation with stercoral colitis noted on CT scan.  Severe metabolic acidosis and lactic acidosis secondary to #1, POA.  Hypernatremia, POA.  History of stroke with functional quadriplegia.    Plan:    Cardiac arrest with prolonged CPR.  - Patient likely has suffered some amount of anoxic neurological injury and currently has seizures which is not a good prognostic indicator.  - Exact etiology of cardiac arrest is uncertain but  likely a respiratory event with aspiration pneumonitis, hypoxia leading to cardiac arrest.  - Continue ventilatory management per pulmonology.  - Continue IV antibiotics therapy with Zosyn.  - Have discussed the patient's treatment plan with Dr. Castro.    Anoxic encephalopathy.  - Patient was seen by Dr. Kiran from telemetry neurology.  - Patient has been placed on Keppra and fosphenytoin.  - Continue propofol for sedation.  - Obtain EEG.    Severe metabolic acidosis/ANDERS.  - Patient is status post bicarbonate drip with improvement in her pH.  - We will switch the IV fluids to dextrose with potassium to improve hypernatremia.    Constipation with stercoral colitis.  - We will administer stool softeners and laxatives.  - Continue Zosyn.    Nutrition.  - Hold off on tube feeds today until further discussion regarding goals of care can be clarified.    Unfortunately, patient's short and long-term prognosis seems to be extremely poor due to high likelihood of underlying anoxic encephalopathy.  I had a long discussion with the patient's 2 daughters and granddaughters were at the bedside.  I discussed advanced directives and they want to talk to the patient's son prior to making further decisions.  We will consult palliative care services.    Discussed with nursing staff at the bedside.    I have reviewed the copied text and it is accurate as of 09/25/24    DVT Prophylaxis: Enoxaparin  Code Status: Full  Diet: N.p.o.  Discharge Plan: Pending.    Sharath Monae MD  09/25/24  15:12 EDT    Dictated utilizing Dragon dictation.

## 2024-09-26 NOTE — PROGRESS NOTES
RT EQUIPMENT DEVICE RELATED - SKIN ASSESSMENT    Blake Score:  Blake Score: 11     RT Medical Equipment/Device:     ETT Gates/Anchorfast    Skin Assessment:      Cheek:  Intact    Device Skin Pressure Protection:  Positioning supports utilized    Nurse Notification:  Zhane Urias, CRT

## 2024-09-26 NOTE — PLAN OF CARE
Goal Outcome Evaluation:  Plan of Care Reviewed With: patient        Progress: no change  Outcome Evaluation: Patient withdraws from painful stimulus with decorticate posturing visisble, on minimal sedation with propofol, no seizure like activity noted, patient with slightly elevated temp that reduced with ice packs, VSS, NG with large amount of output, family will meet with palliative care this am to discuss goals and plan of care

## 2024-09-26 NOTE — PROGRESS NOTES
"Dietitian Follow-up    Patient Name: Annabel Hyman  YOB: 1940  MRN: [id number]  Admission date: 9/24/2024    Comment:      Clinical Nutrition Follow-up   Encounter Information        Trending Narrative     9/26: Patient remains NPO/intubated/sedated. Patient to continue on NPO diet regimen until further discussion regarding goals of care. Palliative to meet with family today. Will continue to follow-up and monitor.     9/25: Patient with MST score of 2 - unsure of recent weight loss d/t chart review. Patient currently NPO w/ NG-tube - intubated and sedated. Propofol ordered 1.42-14.22mL/hr providing 38-375kcals/day. MAP is 121mmHg and lactate 3.1mmol/L. Will continue to follow-up and monitor.      Anthropometrics        Current Height, Weight Height: 139.7 cm (55\")  Weight: 48.1 kg (106 lb 0.7 oz) (09/26/24 0545)       Trending Weight Hx     This admission:              PTA:     Wt Readings from Last 30 Encounters:   09/26/24 0545 48.1 kg (106 lb 0.7 oz)   09/25/24 0600 47.4 kg (104 lb 8 oz)   09/24/24 2315 47.4 kg (104 lb 8 oz)   09/24/24 1808 49 kg (108 lb)      BMI kg/m2 Body mass index is 24.65 kg/m².     Labs        Pertinent Labs Results from last 7 days   Lab Units 09/26/24  0506 09/25/24  0412 09/24/24  1828   SODIUM mmol/L 145 150* 143   POTASSIUM mmol/L 3.1* 3.1* 5.3*   CHLORIDE mmol/L 103 104 104   CO2 mmol/L 30.3* 30.8* 13.8*   BUN mg/dL 30* 39* 28*   CREATININE mg/dL 1.33* 1.23* 1.46*   CALCIUM mg/dL 8.0* 9.4 9.6   BILIRUBIN mg/dL 0.4 0.4 0.3   ALK PHOS U/L 155* 231* 135*   ALT (SGPT) U/L 150* 235* 152*   AST (SGOT) U/L 202* 339* 197*   GLUCOSE mg/dL 155* 215* 240*     Results from last 7 days   Lab Units 09/26/24  0506 09/25/24  0412 09/24/24  1828   MAGNESIUM mg/dL  --  3.0* 2.2   PHOSPHORUS mg/dL  --  3.3 8.0*   HEMOGLOBIN g/dL 7.8* 9.5* 9.6*   HEMATOCRIT % 24.7* 29.5* 33.3*         Medications    Scheduled Medications chlorhexidine, 15 mL, Mouth/Throat, Q12H  enoxaparin, 30 mg, " Subcutaneous, Q24H  fosphenytoin (Cerebyx) 100 mg PE in sodium chloride 0.9 % 50 mL IVPB, 100 mg PE, Intravenous, Q8H  fosphenytoin (Cerebyx) 720 mg PE in sodium chloride 0.9 % 100 mL IVPB, 720 mg PE, Intravenous, Once  levETIRAcetam, 1,000 mg, Intravenous, Q12H  pantoprazole, 40 mg, Intravenous, Q24H  piperacillin-tazobactam, 4.5 g, Intravenous, Q8H  senna-docusate sodium, 2 tablet, Nasogastric, BID  sodium chloride, 10 mL, Intravenous, Q12H        Infusions dexmedetomidine, 0.2 mcg/kg/hr, Last Rate: 0.2 mcg/kg/hr (09/26/24 0948)  dextrose 5 % with KCl 20 mEq, 100 mL/hr, Last Rate: 100 mL/hr (09/26/24 0525)  Pharmacy to Dose enoxaparin (LOVENOX),   Pharmacy to Dose Zosyn,         PRN Medications   senna-docusate sodium **AND** polyethylene glycol **AND** [DISCONTINUED] bisacodyl **AND** bisacodyl    nitroglycerin    Pharmacy to Dose enoxaparin (LOVENOX)    Pharmacy to Dose Zosyn    sodium chloride    sodium chloride     Physical Findings        Trending Physical   Appearance, NFPE    --  Edema  None reported    Bowel Function 9/25   Tubes CVC  Peripheral IV   NG-tube    Chewing/Swallowing NPO   Skin WNL    --  Current Nutrition Orders & Evaluation of Intake       Oral Nutrition     Food Allergies    Current PO Diet NPO Diet NPO Type: Strict NPO   Supplement    PO Evaluation     Trending % PO Intake NPO     Nutrition Diagnosis         Nutrition Dx Problem 1 Needs alternate r/t NPO diet regimen as evidenced by patient with NG-tube and need for enteral nutrition when medically appropriate.       Nutrition Dx Problem 2        Intervention Goal         Intervention Goal(s) Initiation of enteral nutrition when/if medically appropriate  Maintain current body weight     Nutrition Intervention        RD Action Will continue to follow-up and monitor     Nutrition Prescription          Diet Prescription NPO   Supplement Prescription    Enteral Nutrition Prescription     TPN Prescription       Monitor/Evaluation        Monitor  Per protocol, Pertinent labs, Weight, Skin status, GI status, Symptoms, Swallow function, Hemodynamic stability     RD to follow-up  Electronically signed by:  Chel Zaragoza RD  09/26/24 10:59 EDT

## 2024-09-26 NOTE — PROGRESS NOTES
RT EQUIPMENT DEVICE RELATED - SKIN ASSESSMENT    Blake Score:  Blake Score: 11     RT Medical Equipment/Device:     ETT Gates/Anchorfast    Skin Assessment:      Cheek:  Intact    Device Skin Pressure Protection:  Skin-to-device areas padded:  None Required    Nurse Notification:  Zhane Eisenberg, RRT

## 2024-09-26 NOTE — PLAN OF CARE
Goal Outcome Evaluation:  Plan of Care Reviewed With: patient, family           Outcome Evaluation: Pt seen by hospitalist and consulting MDs.  Pupils reactive, corneal reflexes, squeezed with left hand and moved Left foot.  Continues on 2 anti-seizure medications, Zosyn, IVFs.  Propofol changed to precedex.  Tube feeding started this afternoon.

## 2024-09-26 NOTE — CASE MANAGEMENT/SOCIAL WORK
DCP; Return to one of her daughters house. Pt remains intubated and is not medically ready for discharge. Palliative involved for GOC. CM continues to follow.

## 2024-09-26 NOTE — PLAN OF CARE
Problem: Communication Impairment (Mechanical Ventilation, Invasive)  Goal: Effective Communication  Outcome: Progressing     Problem: Device-Related Complication Risk (Mechanical Ventilation, Invasive)  Goal: Optimal Device Function  Outcome: Progressing     Problem: Inability to Wean (Mechanical Ventilation, Invasive)  Goal: Mechanical Ventilation Liberation  Outcome: Progressing     Problem: Ventilator-Induced Lung Injury (Mechanical Ventilation, Invasive)  Goal: Absence of Ventilator-Induced Lung Injury  Outcome: Progressing     Problem: Ventilator-Induced Lung Injury (Mechanical Ventilation, Invasive)  Goal: Absence of Ventilator-Induced Lung Injury  Outcome: Progressing   Goal Outcome Evaluation:

## 2024-09-26 NOTE — PROGRESS NOTES
"DOS: 2024  NAME: Annabel Hyman   : 1940  PCP: Provider, No Known  Chief Complaint   Patient presents with    Cardiac Arrest       Chief complaint: Overnight she was on intravenous propofol drip which has been turned off.  Subjective: Patient received loading dose of intravenous fosphenytoin at 20 mg/kg body weight along with a loading dose of Keppra at 60 mg/kg body weight intravenous.  No further seizure activities have been noted so far.    Objective:  Vital signs: /61   Pulse 88   Temp 98.9 °F (37.2 °C) (Oral)   Resp 22   Ht 139.7 cm (55\")   Wt 48.1 kg (106 lb 0.7 oz)   SpO2 100%   BMI 24.65 kg/m²    Gen: NAD, vitals reviewed  MS: Comatose with [place] Coma Scale of 3.  CN: Cranials 2-12: The pupils are sluggishly reacting to light.  The doll's eye movements are still absent.  The gag response is present.  On pinching the left side of the neck she tends to turn the head to the left side but not on the right side.  Motor: No decerebrate posturing noted on pinching the fingers on either hand but she never pull them away either.  Started to pull up her legs when the inner side of the thighs was pinched  Sensory: Recognize noxious stimulation in the inner part of the thighs when she pulled her legs up.  Coordination: Could not be tested at this time.  Gait: Could not be tested at this time.    ROS:  General: Patient intubated and on the ventilator.  The intravenous propofol drip has been turned off.  Neurological: No further rhythmic jerking movements like yesterday are noted so far.    Laboratory results:  Lab Results   Component Value Date    GLUCOSE 155 (H) 2024    CALCIUM 8.0 (L) 2024     2024    K 3.1 (L) 2024    CO2 30.3 (H) 2024     2024    BUN 30 (H) 2024    CREATININE 1.33 (H) 2024    BCR 22.6 2024    ANIONGAP 11.7 2024     Lab Results   Component Value Date    WBC 15.26 (H) 2024    HGB 7.8 (L) " 09/26/2024    HCT 24.7 (L) 09/26/2024    MCV 84.0 09/26/2024     09/26/2024     Review of labs: Low hemoglobin and hematocrit at 7.8 and 24.7 noted.  The white cell count is elevated at 15,260.  The EGFR is compromised at 39.     CMP:        Lab 09/26/24  0506 09/25/24  0412 09/24/24  1828   SODIUM 145 150* 143   POTASSIUM 3.1* 3.1* 5.3*   CHLORIDE 103 104 104   CO2 30.3* 30.8* 13.8*   ANION GAP 11.7 15.2* 25.2*   BUN 30* 39* 28*   CREATININE 1.33* 1.23* 1.46*   EGFR 39.0* 42.9* 34.9*   GLUCOSE 155* 215* 240*   CALCIUM 8.0* 9.4 9.6   MAGNESIUM  --  3.0* 2.2   PHOSPHORUS  --  3.3 8.0*   TOTAL PROTEIN 5.9* 6.7 7.0   ALBUMIN 3.0* 3.6 3.6   GLOBULIN 2.9 3.1 3.4   ALT (SGPT) 150* 235* 152*   AST (SGOT) 202* 339* 197*   BILIRUBIN 0.4 0.4 0.3   BILIRUBIN DIRECT <0.2  --   --    ALK PHOS 155* 231* 135*        Review and interpretation of imaging: The KUB shows the following:  FINDINGS:  A supine view of the abdomen was obtained. There is gaseous  distention of the stomach as seen on CT earlier the same day. As seen on  prior CT there is a NG tube with the tip in the fundus of the stomach.  The sideport is located in the distal esophagus; recommend advancement  by 4 to 5 cm..  There are no pathologic calcifications.  No acute  osseous abnormality is identified. There is evidence of old calcified  granulomatous disease. Left internal jugular catheter identified with  tip in the right atrium.     IMPRESSION:  NG tube tip present in the fundus of the stomach with  sideport in the distal esophagus; recommend advancement of NG tube by 4  to 5 cm..    CT Head Without Contrast    Result Date: 9/24/2024  FINAL REPORT TECHNIQUE: null CLINICAL HISTORY: post code COMPARISON: null FINDINGS: CT HEAD WITHOUT CONTRAST Comparison: None Findings: There is motion artifact. No acute intracranial hemorrhage, extra-axial fluid collection, hydrocephalus or midline shift. Age appropriate generalized parenchymal atrophy. There are  periventricular and subcortical white matter hypodensities which are nonspecific but most likely related to microangiopathic gliosis. Intracranial arteriosclerosis. There is no sinus or mastoid fluid. Visualized orbits: No acute abnormalities. There is no acute fracture.     Impression: IMPRESSION: 1. No acute intracranial process. Authenticated and Electronically Signed by Becki Franklin DO on 09/24/2024 09:01:33 PM     CT Angiogram Chest    Result Date: 9/24/2024  FINAL REPORT TECHNIQUE: null CLINICAL HISTORY: respiratory arrest COMPARISON: null FINDINGS: Exam: CTA Chest with IV contrast. Procedure: Coronal and sagittal MIP reformats were performed Comparison: None Clinical history: Cardiac arrest Findings: No pulmonary emboli. No thoracic aortic aneurysm or dissection. No hilar or mediastinal adenopathy. No pericardial fluid collection. No pleural fluid collection. Acute fractures of the left 5, 6 and 7 anterior ribs. Acute fractures of the right anterior 5 and 6 ribs. No pneumothorax. Mild bibasilar atelectasis. The right upper arm is only partially visualized. Low-density ill-defined fluid and air seen in the soft tissues of the right upper arm. T3 compression fracture with 10% loss of vertebral body height. Age of the fracture is indeterminate. T12 compression fracture with 25% loss of vertebral body height. Age of the fracture is indeterminate.     Impression: Impression: 1. No pulmonary emboli. 2. No thoracic aortic aneurysm or dissection 3. Acute fractures of the left 5th-7th anterior ribs and right anterior 6th and 7th ribs. No pneumothorax. Mild bibasilar atelectasis. 4. The right upper arm is only partially visualized. Low-density ill-defined fluid and air seen in the soft tissues of the right upper arm. Please correlate clinically. 5. T3 and T12 compression fractures of indeterminate age. Please correlate with physical exam. MRI could be considered for further evaluation. Authenticated and  Electronically Signed by Sarah Peralta MD on 09/24/2024 09:21:12 PM         Workup to date:Portable chest x-ray shows the following:     Findings:     Endotracheal tube distal tip is positioned 2.5 cm superior to the claude. A left central line crosses midline with distal tip terminating at the level of the right atrium. A percutaneous pacer overlies the superior lateral right hemithorax.     Linear scarring or atelectasis involving the left lung base. A large granuloma of the periphery of the mid left lung measures 1.4 cm. No focal consolidation or large pleural effusion. No pneumothorax. The heart size is normal. Retrocardiac atelectasis.     No acute fractures. The stomach lumen is gas-filled and dilated.     IMPRESSION:  IMPRESSION:     1. Endotracheal tube terminates 2.5 cm superior to the claude. Left central line crosses midline with distal tip overlying the expected location of the right atrium.     2. Likely scarring or atelectasis of the left lung base including the retrocardiac region without acute process.         Diagnoses: Patient with anoxic brain injury had initially presented with generalized status epilepticus.      Comment: After the loading dose of intravenous fosphenytoin and a loading dose of intravenous Keppra, it seems that these episodes have also stopped.  Also the patient had been on intravenous propofol drip overnight.    Plan:  1.  As the phenytoin level is only 10.2, will reload her with intravenous fosphenytoin and maintenance of 100 mg intravenous every 8 hours.  2.  Check trough phenytoin levels daily from onwards.  3.  Keep her on Keppra 1000 mg intravenous every 12 hours.  4.  An EEG is currently pending for today.    Discussed with the patient's family members as well as the nursing staff and she will be followed up by our inpatient teleneurology service.    Spent a total of 30 minutes in face-to-face evaluation and management of the patient using the dedicated telemedicine  device without any interruption with the help of Adryan the rounding nurse with the patient located at the [facility] and myself at a remote location.    Electronically signed by Cheikh Kiran MD, 09/26/24, 10:08 AM EDT.

## 2024-09-26 NOTE — CONSULTS
"Tube Feeding Assessment    Patient Name: Annabel Hyman  YOB: 1940  MRN: [id number]  Admission date: 9/24/2024    Comment:    Rec#1: Initiate Peptamen AF @ 20mL/hr and do not advance  Rec#2: Free water flush 120mL q4hrs  Total TF regimen: 528kcals, 33g protein, 1077mL per day    RD to follow-up and monitor.    Encounter Information     Trending Narrative 9/26: Patient remains NPO/intubated/sedated. RD consulted to provide enteral nutrition recommendations.      9/25: Patient with MST score of 2 - unsure of recent weight loss d/t chart review. Patient currently NPO w/ NG-tube - intubated and sedated. Propofol ordered 1.42-14.22mL/hr providing 38-375kcals/day. MAP is 121mmHg and lactate 3.1mmol/L. Will continue to follow-up and monitor.    H&P  Past Medical History:   Diagnosis Date    Dysphagia     Hypertension     Stroke 2013         History reviewed. No pertinent surgical history.     Anthropometrics     Current Height, Weight Height: 139.7 cm (55\")  Weight: 48.1 kg (106 lb 0.7 oz) (09/26/24 0545)     Trending Weight History Wt Readings from Last 30 Encounters:   09/26/24 0545 48.1 kg (106 lb 0.7 oz)   09/25/24 0600 47.4 kg (104 lb 8 oz)   09/24/24 2315 47.4 kg (104 lb 8 oz)   09/24/24 1808 49 kg (108 lb)       BMI Body mass index is 24.65 kg/m².     Labs     Pertinent Labs Results from last 7 days   Lab Units 09/26/24  0506 09/25/24  0412 09/24/24  1828   SODIUM mmol/L 145 150* 143   POTASSIUM mmol/L 3.1* 3.1* 5.3*   CHLORIDE mmol/L 103 104 104   CO2 mmol/L 30.3* 30.8* 13.8*   BUN mg/dL 30* 39* 28*   CREATININE mg/dL 1.33* 1.23* 1.46*   CALCIUM mg/dL 8.0* 9.4 9.6   BILIRUBIN mg/dL 0.4 0.4 0.3   ALK PHOS U/L 155* 231* 135*   ALT (SGPT) U/L 150* 235* 152*   AST (SGOT) U/L 202* 339* 197*   GLUCOSE mg/dL 155* 215* 240*        Results from last 7 days   Lab Units 09/26/24  0506 09/25/24  0412 09/24/24  1828   MAGNESIUM mg/dL  --  3.0* 2.2   PHOSPHORUS mg/dL  --  3.3 8.0*   HEMOGLOBIN g/dL 7.8* 9.5* " "9.6*   HEMATOCRIT % 24.7* 29.5* 33.3*            No results found for: \"HGBA1C\"     Medications  Schedule Medications chlorhexidine, 15 mL, Mouth/Throat, Q12H  enoxaparin, 30 mg, Subcutaneous, Q24H  fosphenytoin (Cerebyx) 100 mg PE in sodium chloride 0.9 % 50 mL IVPB, 100 mg PE, Intravenous, Q8H  levETIRAcetam, 1,000 mg, Intravenous, Q12H  pantoprazole, 40 mg, Intravenous, Q24H  piperacillin-tazobactam, 4.5 g, Intravenous, Q8H  senna-docusate sodium, 2 tablet, Nasogastric, BID  sodium chloride, 10 mL, Intravenous, Q12H       Infusions  dexmedetomidine, 0.2 mcg/kg/hr, Last Rate: 0.2 mcg/kg/hr (09/26/24 1120)  dextrose 5 % with KCl 20 mEq, 100 mL/hr, Last Rate: 100 mL/hr (09/26/24 0525)  Pharmacy to Dose enoxaparin (LOVENOX),   Pharmacy to Dose Zosyn,         PRN Medications    senna-docusate sodium **AND** polyethylene glycol **AND** [DISCONTINUED] bisacodyl **AND** bisacodyl    nitroglycerin    Pharmacy to Dose enoxaparin (LOVENOX)    Pharmacy to Dose Zosyn    sodium chloride    sodium chloride     Physical Findings        Edema  None reported    Bowel Function 9/25   Tubes CVC  Peripheral IV   NG-tube   Skin WNL     Estimated/Assessed Needs       Energy Requirements    EST Needs, Method, Wt used 1175-1410kcals/day using 25-30kcals/kg          Protein Requirements    EST Needs, Method, Wt used 47-56g protein per day using 1-1.2g/kg        Fluid Requirements     Estimated Needs (mL/day) 1410mL per day        Current Tube Feed Orders & Evaluation      Enteral Nutrition     Enteral Route NG-tube    Orders, Modulars, Flushes    Residual/Tolerance    Vasopressors None    Noninvasive MAP (mmHg) 83   Lactate (mmol/L) 3.1   Propofol (mL/hr)     Tube Feed Observation      Nutrition Diagnosis         Nutrition Dx Problem 1 Needs alternate r/t NPO diet regimen as evidenced by patient with NG-tube and need for enteral nutrition when medically appropriate.          Nutrition Dx Problem 2        Intervention Goal         " Intervention Goal(s) Initiation of enteral nutrition   Tolerate EN   Maintain current body weight      Nutrition Intervention        RD Action Will provide enteral nutrition recommendations      Enteral Nutrition Prescription     Enteral Prescription Rec#1: Initiate Peptamen AF @ 20mL/hr and do not advance  Rec#2: Free water flush 120mL q4hrs  Total TF regimen: 528kcals, 33g protein, 1077mL per day         Monitor/Evaluation        Monitor Per protocol, Pertinent labs, EN delivery/tolerance, Weight, Skin status, GI status, Symptoms, Hemodynamic stability     RD to follow-up.     Electronically signed by:  Chel Zaragoza RD  09/26/24 13:44 EDT

## 2024-09-26 NOTE — PROGRESS NOTES
[facility]    Name:  Annabel Hyman   Age:  84 y.o.  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    Subjective:    Annabel Hyman was seen and examined this morning.  She is currently on Precedex sedation.  She was seen by Dr. Kiran from neurology who recommended changing her from propofol to Precedex.  According to the nursing staff and the Precedex was turned off this morning she was able to open her eyes to call.  Fortunately, she has not had any seizures since yesterday after initiation of antiseizure medications.  She had about 500 mL of NG tube aspirate since last night.    Hospital Course:    Annabel Hyman is an 84-year-old female, chronically wheelchair-bound, history of CVA, hypertension, Alzheimer dementia was brought to the emergency room by EMS after cardiac arrest at home.  Patient apparently was sitting up and eating a cake while a strawberry got stuck in her throat.  She subsequently became unresponsive and turned blue.  Family started CPR and apparently took about 15 minutes for EMS arrival who continued the CPR with the Matthew device.  Family states that the patient did not turn cyanotic prior to EMS arrival.  Patient was transported to the ER with ongoing CPR and after she was in the ER and another dose of epinephrine, ROSC was obtained.  Patient was intubated and a left IJ central line was placed.  She was initiated on Levophed and was admitted to the ICU.    Initial vitals: Temperature 99.2, pulse 121, blood pressure 138/56 with subsequent drop to 83/49.  Pulse oxygen saturation 89% on arrival.  Initial ABG pH less than 6.7, pCO2 71, p.o. 213, bicarb 9.6 on 100% Ambu bag.  CMP showed a potassium of  5.3, BUN 28, creatinine 1.46 (unknown baseline), glucose 240, , .  Lactic acid was 14.  C-reactive protein 0.45.  INR 1.32.  WBC 12.3, hemoglobin 9.6.  Chest x-ray showed likely scarring or atelectasis of the left lung base.  Noncontrast CT of the head showed no acute intracranial process.  CT angiogram of the chest was negative for pulmonary embolism or aortic aneurysm.  Showed acute fractures of the left 5-7th anterior ribs and right anterior sixth and seventh ribs.  No pneumothorax.  T3 and T12 compression fractures of indeterminate age noted.  CT of the abdomen and pelvis with contrast showed very large amount of stool in the rectum with mild rectal wall thickening.  Stomach is distended with fluid.  Gallbladder not identified.  Intra and extrahepatic biliary dilatation without choledocholithiasis noted.  L4 and L5 compression fractures of indeterminate age.  Patient was given IV Zosyn and was placed on Precedex, Levophed and was subsequently admitted to the medical ICU.    Patient was seen by Dr. Castro from pulmonology for ventilatory management.  She was also seen by Dr. Kiran for tonic-clonic seizures while on mechanical ventilation.  He recommended Keppra and fosphenytoin.  EEG was ordered.  Patient's blood pressure improved and Levophed was discontinued.  Due to high suspicion of anoxic encephalopathy, palliative care services were consulted.    Review of Systems:     All systems were reviewed and negative except as mentioned in subjective, assessment and plan.    Vital Signs:    Temp:  [98.9 °F (37.2 °C)-100.1 °F (37.8 °C)] 100 °F (37.8 °C)  Heart Rate:  [74-93] 90  Resp:  [20-23] 20  BP: (101-146)/(49-90) 119/49  FiO2 (%):  [30 %-35 %] 30 %    Intake and output:    I/O last 3 completed shifts:  In: 5632.9 [I.V.:5152.9; NG/GT:80; IV Piggyback:400]  Out: 3425 [Urine:2025; Emesis/NG output:900; Stool:500]  I/O this shift:  In: -   Out: 415 [Urine:415]    Physical Examination:    General  Appearance:  Unresponsive on mechanical ventilation.     Head:  Atraumatic and normocephalic.   Eyes: Conjunctivae and sclerae normal, no icterus. No pallor.  Pupils bilaterally equal and reactive to light.   Throat: No oral lesions, no thrush, oral mucosa moist.  Endotracheal tube is in place.  NG tube is in place.   Neck: Supple, trachea midline, no thyromegaly.  Left internal jugular central venous line noted.   Lungs:   Breath sounds heard bilaterally equally.  No wheezing or crackles. No Pleural rub or bronchial breathing.   Heart:  Normal S1 and S2, no murmur, no gallop, no rub. No JVD.   Abdomen:   Normal bowel sounds, no masses, no organomegaly. Soft, nontender, nondistended, no rebound tenderness.  Butler catheter is in place.   Extremities: Supple, no edema, no cyanosis, no clubbing.  Poor muscle mass.   Skin: No bleeding or rash.   Neurologic: Unresponsive.  Currently on mechanical ventilation. No facial asymmetry.  Significant ankle clonus noted bilaterally.   Laboratory results:    Results from last 7 days   Lab Units 09/26/24  0506 09/25/24  0412 09/24/24  1828   SODIUM mmol/L 145 150* 143   POTASSIUM mmol/L 3.1* 3.1* 5.3*   CHLORIDE mmol/L 103 104 104   CO2 mmol/L 30.3* 30.8* 13.8*   BUN mg/dL 30* 39* 28*   CREATININE mg/dL 1.33* 1.23* 1.46*   CALCIUM mg/dL 8.0* 9.4 9.6   BILIRUBIN mg/dL 0.4 0.4 0.3   ALK PHOS U/L 155* 231* 135*   ALT (SGPT) U/L 150* 235* 152*   AST (SGOT) U/L 202* 339* 197*   GLUCOSE mg/dL 155* 215* 240*     Results from last 7 days   Lab Units 09/26/24  0506 09/25/24  0412 09/24/24  1828   WBC 10*3/mm3 15.26* 18.45* 12.31*   HEMOGLOBIN g/dL 7.8* 9.5* 9.6*   HEMATOCRIT % 24.7* 29.5* 33.3*   PLATELETS 10*3/mm3 243 302 250     Results from last 7 days   Lab Units 09/24/24  1828   INR  1.32*     Results from last 7 days   Lab Units 09/24/24 2040 09/24/24  1852   BLOODCX   --  No growth at 24 hours  No growth at 24 hours   URINECX  >100,000 CFU/mL Gram Negative Bacilli*  --      Results  from last 7 days   Lab Units 09/26/24  0724   PH, ARTERIAL pH units 7.516*   PO2 ART mm Hg 113.0*   PCO2, ARTERIAL mm Hg 39.3   HCO3 ART mmol/L 31.8*     I have reviewed the patient's laboratory results.    Radiology results:    EEG    Result Date: 9/26/2024  Reason for referral: 84 y.o.female with anoxia, altered mental status, episodes of rhythmic jerking, consideration of seizures Technical Summary:  A 16 channel digital EEG was performed using a cap for electrode placement.  A single EKG lead is present.  Duration:  20 minutes Findings: The patient is intubated.  Diffuse low amplitude theta and beta activity is present symmetrically over both hemispheres.  No lateralizing features are seen.  Photic stimulation does not change the background.  IV pump artifact is prominent disease study proceeds, electrode artifact is variably prominent at times over the midline parietal leads.  No focal features or epileptiform activity are seen.  No electrographic seizures are present. Video: None Technical quality: Fair SUMMARY: Moderate generalized suppression and slow No focal features or epileptiform activity are seen     Impression: Diffuse cerebral dysfunction, at least moderate in degree No ongoing seizures are seen This report is transcribed using the Dragon dictation system.      XR Chest 1 View    Result Date: 9/26/2024   PROCEDURE: XR CHEST 1 VW-  HISTORY: Resp Failure.; R09.2-Respiratory arrest; T17.308A-Unspecified foreign body in larynx causing other injury, initial encounter  COMPARISON: 2 days prior.  FINDINGS: The heart is normal in size. The mediastinum is unremarkable. Decreased inspiratory effort noted with crowding of the lung markings in both lung bases. No area of consolidation is seen. There is mild interstitial disease similar to prior. Left internal jugular catheter and ET tube are in stable position. NG tube placed since the prior exam. Tip and sideport are located in the fundus of the stomach.  Previously described gaseous distention of the stomach has resolved. There is no pneumothorax.  There are no acute osseous abnormalities.      Impression: Interval placement of NG tube with resolved gaseous distention of the stomach..  Stable ET tube and left internal jugular catheter from prior..  No acute infiltrate seen.    This report was signed and finalized on 9/26/2024 7:21 AM by Ana Pacheco MD.      XR Abdomen KUB    Result Date: 9/25/2024  PROCEDURE: XR ABDOMEN KUB-  INDICATION:  NG tube insertion verification; R09.2-Respiratory arrest; T17.308A-Unspecified foreign body in larynx causing other injury, initial encounter  COMPARISON: 1 day prior.  FINDINGS:  A supine view of the abdomen was obtained.  The bowel gas pattern is normal.  There are no pathologic calcifications.  No acute osseous abnormality is identified. NG tube has been advanced since the prior exam. Tip and the sideport are now located in the fundus of the stomach. Previously described gaseous distention of the stomach has resolved. Moderate amount of stool identified in the rectosigmoid, fecal impaction not excluded.      Impression: Interval advancement NG tube now in good position with resolution of previous gaseous distention of the stomach..     This report was signed and finalized on 9/25/2024 11:02 AM by Ana Pacheco MD.      XR Abdomen KUB    Result Date: 9/25/2024  PROCEDURE: XR ABDOMEN KUB-  INDICATION:  NG placement; R09.2-Respiratory arrest; T17.308A-Unspecified foreign body in larynx causing other injury, initial encounter  COMPARISON:  None.  FINDINGS:  A supine view of the abdomen was obtained. There is gaseous distention of the stomach as seen on CT earlier the same day. As seen on prior CT there is a NG tube with the tip in the fundus of the stomach. The sideport is located in the distal esophagus; recommend advancement by 4 to 5 cm..  There are no pathologic calcifications.  No acute osseous abnormality is identified. There  is evidence of old calcified granulomatous disease. Left internal jugular catheter identified with tip in the right atrium.      Impression: NG tube tip present in the fundus of the stomach with sideport in the distal esophagus; recommend advancement of NG tube by 4 to 5 cm..  Partially visualized left internal jugular catheter.     This report was signed and finalized on 9/25/2024 9:38 AM by Ana Pacheco MD.      CT Abdomen Pelvis With Contrast    Result Date: 9/24/2024  FINAL REPORT TECHNIQUE: null CLINICAL HISTORY: cardiac arrest COMPARISON: null FINDINGS: Exam: CT Abdomen and Pelvis with contrast Comparison: None Clinical history: Cardiac arrest Findings: NG tube tip in the stomach. Fluid in the distal esophagus may be reflective of gastroesophageal reflux. Stomach is distended with fluid. Mild bibasilar atelectasis. Liver does not demonstrate any focal lesions. Gallbladder not identified and is likely surgically absent. Enlargement of the Intrahepatic biliary ducts, common hepatic duct, common bile duct and pancreatic duct. No choledocholithiasis identified. Findings may be due to pancreatic head lesion, ampullary stricture/lesion, sphincter dysfunction or post cholecystectomy status. No pancreatic head lesion identified on today's exam. Spleen has a normal appearance. No acute pancreatitis. Normal adrenal glands. No renal stones. Symmetric enhancement of the kidneys bilaterally. No CT evidence for pyelonephritis. No abdominal aortic aneurysm or dissection. Atherosclerotic vascular disease noted. . No small bowel obstruction or ileus. The cecum ascending colon and transverse colon are distended with gas, fluid and stool. Very large amount of stool in the rectum with rectum distended to 8.7 x 9 x 16.4 cm. Mild rectal wall thickening with perirectal inflammation reflective of a stercoral colitis. The urinary bladder and uterus are deviated ventrally secondary to the very large amount of stool in the rectum.  Urinary bladder is decompressed with a Butler catheter.. Trace amount of free fluid in the deep pelvis. L4 compression fracture with 1/3 loss of vertebral body height. Mild L5 compression fracture with 25% loss of vertebral body height. Hemangioma at L2. Bilateral L5 pars defects with grade 1 anterior subluxation. Degenerative disc and endplate disease at L4-5 and L5-S1. Mild bilateral hip arthritis.     Impression: Impression: 1. Very large amount of stool in the rectum with rectum distended to 8.7 x 9 x 16.4 cm. Mild rectal wall thickening with perirectal inflammation reflective of a stercoral colitis. The cecum ascending colon and transverse colon are distended with gas, fluid and stool. Moderate stool in the left colon. 2. NG tube tip in the stomach. Fluid in the distal esophagus may be reflective of gastroesophageal reflux. Stomach is distended with fluid. 3. Gallbladder not identified and is likely surgically absent. 4. Intra and extrahepatic biliary dilatation with no choledocholithiasis identified. Findings may be due to pancreatic head lesion, ampullary stricture/lesion, sphincter dysfunction or post cholecystectomy status. No pancreatic head lesion identified on today's exam. MR/MRCP could be considered for further evaluation. 5. Urinary bladder is decompressed with a Butler catheter.. 6. L4 and L5 compression fractures of indeterminate age. Please correlate with physical exam. Bilateral L5 pars defects. Degenerative disease of the lumbar spine. Authenticated and Electronically Signed by Sarah Peralta MD on 09/24/2024 09:29:37 PM    CT Angiogram Chest    Result Date: 9/24/2024  FINAL REPORT TECHNIQUE: null CLINICAL HISTORY: respiratory arrest COMPARISON: null FINDINGS: Exam: CTA Chest with IV contrast. Procedure: Coronal and sagittal MIP reformats were performed Comparison: None Clinical history: Cardiac arrest Findings: No pulmonary emboli. No thoracic aortic aneurysm or dissection. No hilar or  mediastinal adenopathy. No pericardial fluid collection. No pleural fluid collection. Acute fractures of the left 5, 6 and 7 anterior ribs. Acute fractures of the right anterior 5 and 6 ribs. No pneumothorax. Mild bibasilar atelectasis. The right upper arm is only partially visualized. Low-density ill-defined fluid and air seen in the soft tissues of the right upper arm. T3 compression fracture with 10% loss of vertebral body height. Age of the fracture is indeterminate. T12 compression fracture with 25% loss of vertebral body height. Age of the fracture is indeterminate.     Impression: Impression: 1. No pulmonary emboli. 2. No thoracic aortic aneurysm or dissection 3. Acute fractures of the left 5th-7th anterior ribs and right anterior 6th and 7th ribs. No pneumothorax. Mild bibasilar atelectasis. 4. The right upper arm is only partially visualized. Low-density ill-defined fluid and air seen in the soft tissues of the right upper arm. Please correlate clinically. 5. T3 and T12 compression fractures of indeterminate age. Please correlate with physical exam. MRI could be considered for further evaluation. Authenticated and Electronically Signed by Sarah Peralta MD on 09/24/2024 09:21:12 PM    CT Head Without Contrast    Result Date: 9/24/2024  FINAL REPORT TECHNIQUE: null CLINICAL HISTORY: post code COMPARISON: null FINDINGS: CT HEAD WITHOUT CONTRAST Comparison: None Findings: There is motion artifact. No acute intracranial hemorrhage, extra-axial fluid collection, hydrocephalus or midline shift. Age appropriate generalized parenchymal atrophy. There are periventricular and subcortical white matter hypodensities which are nonspecific but most likely related to microangiopathic gliosis. Intracranial arteriosclerosis. There is no sinus or mastoid fluid. Visualized orbits: No acute abnormalities. There is no acute fracture.     Impression: IMPRESSION: 1. No acute intracranial process. Authenticated and  Electronically Signed by Becki Franklin DO on 09/24/2024 09:01:33 PM    XR Chest 1 View    Result Date: 9/24/2024  FINAL REPORT TECHNIQUE: null CLINICAL HISTORY: Severe Sepsis triage protocol ET and central line placement COMPARISON: null FINDINGS: 1 view chest x-ray Comparison: None Findings: Endotracheal tube distal tip is positioned 2.5 cm superior to the claude. A left central line crosses midline with distal tip terminating at the level of the right atrium. A percutaneous pacer overlies the superior lateral right hemithorax. Linear scarring or atelectasis involving the left lung base. A large granuloma of the periphery of the mid left lung measures 1.4 cm. No focal consolidation or large pleural effusion. No pneumothorax. The heart size is normal. Retrocardiac atelectasis. No acute fractures. The stomach lumen is gas-filled and dilated.     Impression: IMPRESSION: 1. Endotracheal tube terminates 2.5 cm superior to the claude. Left central line crosses midline with distal tip overlying the expected location of the right atrium. 2. Likely scarring or atelectasis of the left lung base including the retrocardiac region without acute process. Authenticated and Electronically Signed by Anthony Egan DO on 09/24/2024 07:13:52 PM   I have reviewed the patient's radiology reports.    Medication Review:     I have reviewed the patient's active and prn medications.     Problem List:      Cardiac arrest    Acute respiratory failure with hypoxia and hypercapnia    Aspiration pneumonitis    Metabolic acidosis    ANDERS (acute kidney injury)    Ischemic hepatitis    Stercoral colitis    Assessment:    Cardiac arrest at home with prolonged CPR, POA.  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  Acute hypoxic and hypercapnic respiratory failure likely secondary to #2.  Aspiration pneumonitis, POA.  Acute renal failure, POA.  Acute ischemic hepatitis (shock liver), POA.  Constipation with stercoral colitis noted on CT  scan.  Severe metabolic acidosis and lactic acidosis secondary to #1, POA.  Hypernatremia, POA.  History of stroke with functional quadriplegia.    Plan:    Cardiac arrest with prolonged CPR.  - Patient likely has suffered some amount of anoxic neurological injury and currently has seizures which is not a good prognostic indicator.  - Exact etiology of cardiac arrest is uncertain but likely a respiratory event with aspiration pneumonitis, hypoxia leading to cardiac arrest.  - Continue IV antibiotics therapy with Zosyn.  - Patient is being followed by Dr. Castro from pulmonology.    Anoxic encephalopathy.  - Patient was seen by Dr. Kiran from telemetry neurology.  - Patient has been placed on Keppra and fosphenytoin.  - Continue Precedex for sedation.  - EEG shows moderate degree of diffuse cerebral dysfunction without any seizures.    Severe metabolic acidosis/ADNERS.  - Continue dextrose with potassium infusion.  - Creatinine levels seem to be stable.  Patient may have underlying chronic kidney disease.    Constipation with stercoral colitis.  - We will administer stool softeners and laxatives.  - Continue Zosyn.    Nutrition.  - Consult nutrition for initiation of low-dose tube feeds.    I have discussed the patient's condition and treatment plan with her daughter and granddaughter were at the bedside.  Patient's prognosis is still guarded due to her baseline poor functional status, dementia as well as suspicion of anoxic encephalopathy.  Discussed with palliative care services.  For now, her CODE STATUS is full code and palliative care services are going to have a family meeting tomorrow for discussion on goals of care.    Discussed with nursing staff at the bedside.    I have reviewed the copied text and it is accurate as of 09/26/24    DVT Prophylaxis: Enoxaparin  Code Status: Full  Diet: Tube feeds  Discharge Plan: Pending.    Sharath Monae MD  09/26/24  16:06 EDT    Dictated utilizing Dragon dictation.

## 2024-09-26 NOTE — PROGRESS NOTES
"  CC: Acute Respiratory Failure.  Status post cardiac arrest    S: Intubated and sedated.     ROS: Could not be obtained as the patient is on mechanical ventilator.    O:Vital signs reviewed.    /55   Pulse 78   Temp 98.9 °F (37.2 °C) (Oral)   Resp 22   Ht 139.7 cm (55\")   Wt 48.1 kg (106 lb 0.7 oz)   SpO2 100%   BMI 24.65 kg/m²     Temp (24hrs), Av.9 °F (37.2 °C), Min:97.5 °F (36.4 °C), Max:100.1 °F (37.8 °C)      Vent Day # 2  FiO2: 35 %.   PEEP: 5  Peak Pressure: 16    CVP Line. Day # 2.   Butler catheter present.   NG/OG tube present.     I & Os reviewed.   Intake/Output         24 0700 - 24 0659 24 0700 - 24 0659    Intake (ml) 3963.4 --    Output (ml) 2325 95    Net (ml) 1638.4 -95    Last Weight 48.1 kg (106 lb 0.7 oz) --            Net IO Since Admission: 2,112.89 mL [24 0920]    General/Constitutional: Intubated.  Sedated  Eyes: PERRL. Eyes were closed  Neck:  Supple with out obvious JVD. No obvious masses noted.   Cardiovascular: S1 + S2.  Regular  Lungs/Respiratory: Transmitted Breath sounds noted.  No wheezing heard.  No significant crackles noted  GI/Abdomen: Soft. Bowel sounds positive.  No obvious organomegaly noted.  Musculoskeletal/Extremities: No edema noted. Gait could not be assessed at this time, as the patient was laying in bed.   Neurologic: Sedated. Intubated.       Labs: Reviewed.   Results from last 7 days   Lab Units 24  0506 24  0412 24  1828   WBC 10*3/mm3 15.26* 18.45* 12.31*   HEMOGLOBIN g/dL 7.8* 9.5* 9.6*   HEMATOCRIT % 24.7* 29.5* 33.3*   PLATELETS 10*3/mm3 243 302 250   NEUTROPHIL % % 83.4*  --   --    NEUTROS ABS 10*3/mm3 12.74*  --  4.19   EOSINOPHIL % % 0.2*  --   --    EOS ABS 10*3/mm3 0.03  --  0.62*   LYMPHOCYTE % % 11.1*  --   --    LYMPHS ABS 10*3/mm3 1.69  --   --        No results found for: \"PROCALCITO\"    Lab Results   Component Value Date    CRP 0.45 2024       No results found for: \"SEDRATE\"    No " "results found for: \"PROBNP\"    Results from last 7 days   Lab Units 09/26/24  0506 09/25/24  0412 09/24/24  1828   SODIUM mmol/L 145 150* 143   POTASSIUM mmol/L 3.1* 3.1* 5.3*   CHLORIDE mmol/L 103 104 104   CO2 mmol/L 30.3* 30.8* 13.8*   BUN mg/dL 30* 39* 28*   CREATININE mg/dL 1.33* 1.23* 1.46*   CALCIUM mg/dL 8.0* 9.4 9.6   ANION GAP mmol/L 11.7 15.2* 25.2*   BILIRUBIN mg/dL 0.4 0.4 0.3   ALK PHOS U/L 155* 231* 135*   ALT (SGPT) U/L 150* 235* 152*   AST (SGOT) U/L 202* 339* 197*   GLUCOSE mg/dL 155* 215* 240*   TOTAL PROTEIN g/dL 5.9* 6.7 7.0   ALBUMIN g/dL 3.0* 3.6 3.6       Results from last 7 days   Lab Units 09/25/24  0412 09/24/24  1828   MAGNESIUM mg/dL 3.0* 2.2   PHOSPHORUS mg/dL 3.3 8.0*       No results found for: \"TSH\"    No results found for: \"FREET4\"    Results from last 7 days   Lab Units 09/24/24  1828   INR  1.32*       No results found for: \"CKTOTAL\"    No components found for: \"HSTROPT\"    No results found for: \"TROPONINT\"    No results found for: \"DDIMER\"    No results found for: \"LIPASE\"    Brief Urine Lab Results  (Last result in the past 365 days)        Color   Clarity   Blood   Leuk Est   Nitrite   Protein   CREAT   Urine HCG        09/24/24 2040 Yellow   Cloudy   Moderate (2+)   Small (1+)   Negative   100 mg/dL (2+)                     Micro: As of September 26, 2024   No results found for: \"RESPCX\"  No results found for: \"BCIDPCR\"  Lab Results   Component Value Date    BLOODCX No growth at 24 hours 09/24/2024    BLOODCX No growth at 24 hours 09/24/2024     No results found for: \"URINECX\"  No results found for: \"MRSACX\"  No results found for: \"MRSAPCR\"  No results found for: \"URCX\"  No components found for: \"LOWRESPCF\"  No results found for: \"THROATCX\"  No results found for: \"CULTURES\"  No components found for: \"STREPBCX\"  No results found for: \"STREPPNEUAG\"  No results found for: \"LEGIONELLA\"  No results found for: \"LEGANTIGENUR\"  No results found for: \"MYCOPLASCX\"  No results found for: " "\"GCCX\"  No results found for: \"WOUNDCX\"  No results found for: \"BODYFLDCX\"    No results found for: \"FLU\"    No results found for: \"ADENOVIRUS\"  No results found for: \"XK425G\"  No results found for: \"CVHKU1\"  No results found for: \"CVNL63\"  No results found for: \"CVOC43\"  No results found for: \"HUMETPNEVS\"  No results found for: \"HURVEV\"  No results found for: \"FLUBPCR\"  No results found for: \"PARAINFLUE\"  No results found for: \"PARAFLUV2\"  No results found for: \"PARAFLUV3\"  No results found for: \"PARAFLUV4\"  No results found for: \"BPERTPCR\"  No results found for: \"VDKJQ29327\"  No results found for: \"CPNEUPCR\"  No results found for: \"MPNEUMO\"  No results found for: \"FLUAPCR\"  No results found for: \"FLUAH3\"  No results found for: \"FLUAH1\"  No results found for: \"RSV\"  No results found for: \"BPARAPCR\"    COVID 19:  No results found for: \"COVID19\"        ABG:   Recent Labs     09/24/24  1759 09/24/24  1949 09/26/24  0724   PHART <6.767* 7.362 7.516*   WHA7MHZ 70.5* 32.8* 39.3   PO2ART 113.0* 452.0* 113.0*   LIY5MAF 9.6* 18.6* 31.8*   BASEEXCESS -25.3* -6.1* 8.2*       Lab Results   Component Value Date    LACTATE 3.1 (C) 09/25/2024    LACTATE 4.8 (C) 09/25/2024    LACTATE 8.9 (C) 09/24/2024         Echo:       dexmedetomidine, 0.2 mcg/kg/hr  dextrose 5 % with KCl 20 mEq, 100 mL/hr, Last Rate: 100 mL/hr (09/26/24 0525)  Pharmacy to Dose enoxaparin (LOVENOX),   Pharmacy to Dose Zosyn,           Imaging: Latest imaging study was reviewed personally.    Imaging Results (Last 24 Hours)       Procedure Component Value Units Date/Time    XR Chest 1 View [[id number]] Collected: 09/26/24 0719     Updated: 09/26/24 0723    Narrative:       PROCEDURE: XR CHEST 1 VW-     HISTORY: Resp Failure.; R09.2-Respiratory arrest; T17.308A-Unspecified  foreign body in larynx causing other injury, initial encounter     COMPARISON: 2 days prior.     FINDINGS: The heart is normal in size. The mediastinum is unremarkable.  Decreased inspiratory " effort noted with crowding of the lung markings in  both lung bases. No area of consolidation is seen. There is mild  interstitial disease similar to prior. Left internal jugular catheter  and ET tube are in stable position. NG tube placed since the prior exam.  Tip and sideport are located in the fundus of the stomach. Previously  described gaseous distention of the stomach has resolved. There is no  pneumothorax.  There are no acute osseous abnormalities.       Impression:      Interval placement of NG tube with resolved gaseous  distention of the stomach..     Stable ET tube and left internal jugular catheter from prior..     No acute infiltrate seen.           This report was signed and finalized on 9/26/2024 7:21 AM by Ana Pacheco MD.       XR Abdomen KUB [[id number]] Collected: 09/25/24 1100     Updated: 09/25/24 1104    Narrative:      PROCEDURE: XR ABDOMEN KUB-     INDICATION:  NG tube insertion verification; R09.2-Respiratory arrest;  T17.308A-Unspecified foreign body in larynx causing other injury,  initial encounter     COMPARISON: 1 day prior.     FINDINGS:  A supine view of the abdomen was obtained.  The bowel gas  pattern is normal.  There are no pathologic calcifications.  No acute  osseous abnormality is identified. NG tube has been advanced since the  prior exam. Tip and the sideport are now located in the fundus of the  stomach. Previously described gaseous distention of the stomach has  resolved. Moderate amount of stool identified in the rectosigmoid, fecal  impaction not excluded.       Impression:      Interval advancement NG tube now in good position with  resolution of previous gaseous distention of the stomach..              This report was signed and finalized on 9/25/2024 11:02 AM by Ana Pacheco MD.       XR Abdomen KUB [[id number]] Collected: 09/25/24 0936     Updated: 09/25/24 0940    Narrative:      PROCEDURE: XR ABDOMEN KUB-     INDICATION:  NG placement; R09.2-Respiratory  "arrest;  T17.308A-Unspecified foreign body in larynx causing other injury,  initial encounter     COMPARISON:  None.     FINDINGS:  A supine view of the abdomen was obtained. There is gaseous  distention of the stomach as seen on CT earlier the same day. As seen on  prior CT there is a NG tube with the tip in the fundus of the stomach.  The sideport is located in the distal esophagus; recommend advancement  by 4 to 5 cm..  There are no pathologic calcifications.  No acute  osseous abnormality is identified. There is evidence of old calcified  granulomatous disease. Left internal jugular catheter identified with  tip in the right atrium.       Impression:      NG tube tip present in the fundus of the stomach with  sideport in the distal esophagus; recommend advancement of NG tube by 4  to 5 cm..     Partially visualized left internal jugular catheter.              This report was signed and finalized on 2024 9:38 AM by Ana Pacheco MD.                 Assessment & Recommendations/Plan:   1.  Acute Respiratory Failure  Not stable for extubation today, due to continued encephalopathy.  Chest x-ray and ABG as clinically indicated.  ABG suggests respiratory alkalosis.  Adjustments to mechanical ventilation made as clinically needed.  Currently on propofol  May need to be switched to Precedex.    2.  Status post cardiac arrest  Unknown amount of time     3.  Anoxic brain injury  Extremely high suspicion clinically.  Neurology following  EEG pending    4.  Metabolic acidosis/shock liver  Metabolic acidosis seems to have improved  Liver enzymes are slowly decreasing  Will continue clinical follow-up    5.  Colitis/infection  On antibiotics per hospitalist service  Temp (24hrs), Av.9 °F (37.2 °C), Min:97.5 °F (36.4 °C), Max:100.1 °F (37.8 °C)  Temp (72hrs), Av.5 °F (36.9 °C), Min:97.1 °F (36.2 °C), Max:100.1 °F (37.8 °C)    No results found for: \"PROCALCITO\"  Lab Results   Component Value Date    WBC 15.26 (H) " "09/26/2024    WBC 18.45 (H) 09/25/2024    WBC 12.31 (H) 09/24/2024     6.  Hematologic/anemia  Likely dilutional  We will continue to monitor closely  Lab Results   Component Value Date    HGB 7.8 (L) 09/26/2024    HGB 9.5 (L) 09/25/2024    HGB 9.6 (L) 09/24/2024     Lab Results   Component Value Date     09/26/2024     09/25/2024     09/24/2024     Lab Results   Component Value Date    INR 1.32 (H) 09/24/2024     6 7.  Renal/Fluid status/Electrolytes  Lab Results   Component Value Date     09/26/2024     (H) 09/25/2024     09/24/2024    K 3.1 (L) 09/26/2024    K 3.1 (L) 09/25/2024    K 5.3 (H) 09/24/2024     Lab Results   Component Value Date    BUN 30 (H) 09/26/2024    BUN 39 (H) 09/25/2024    BUN 28 (H) 09/24/2024    CREATININE 1.33 (H) 09/26/2024    CREATININE 1.23 (H) 09/25/2024    CREATININE 1.46 (H) 09/24/2024     Net IO Since Admission: 2,112.89 mL [09/26/24 0920]  No results found for: \"PROBNP\"    8.  GI/nutrition  Nutrition per Dietician.   GI prophylaxis per admitting attending.    9.  DVT prophylaxis  Per admitting attending.    10.  Miscellaneous  Had a long discussion with the patient daughter at the bedside.  She is aware of the poor prognosis.    She also says that her family has been discussing possibility of terminal extubation although they are \"not ready for it at this time\".    The patient remains at significant risk for organ dysfunction and damage, requiring multiple measures to support and sustain organ function.  Condition remains critical.    Critical Care time spent in direct patient care: 40 minutes including high complexity decision making to assess, manipulate, and support vital organ system failure in this individual who has impairment of one or more vital organ systems such that there is a high probability of imminent or life threatening deterioration in the patient’s condition.  This time includes multiple reassessments throughout the day, if " needed and as appropriate.  This time excludes other billable procedures. Time does include preparation of documents, review of old records, coordinating care with other providers and direct bedside care.    I have discussed patient's overall clinical status with nursing staff especially with regards to need for neurological assessment and my discussion with the family member at the bedside.    Plan was also discussed with nursing staff, as necessary.     This document was electronically signed by Vincent Castro MD on 09/26/24 at 09:20 EDT      Dictated utilizing Dragon dictation.

## 2024-09-26 NOTE — CONSULTS
[facility]    INPATIENT PALLIATIVE CARE CONSULT      Name:  Annabel Hyman   Age:  84 y.o.  Sex:  female  :  1940  MRN:  [id number]   Visit Number:  [id number]  Date of Service:  24  Date of Admission:  2024  Primary Care Physician:  Provider, No Known    Consulting Physician:  Dr. Mccormick    Reason For Consult:  Introduction to Palliative services, Goals of care discussions , and Support during serious illness    History of Present Illness:  Annabel Hyman is a 84 y.o. female with past medical history of impaired mobility and debility who is largely wheelchair dependent, history of CVA, Alzheimer dementia, HTN.  Patient presented to [facility] on 2024 secondary to cardiac arrest at home.  Per chart review, patient sitting upright, eating, when a strawberry led to airway obstruction.  Patient became unresponsive, cyanotic.  CPR initiated by family for approximately 15 minutes prior to EMS arrival.  Patient transported to ED with ongoing ACLS interventions.  Upon ED evaluation, ROSC obtained.  Initial vitals noting temp 99.2, pulse 121, /56 with subsequent drop to 83 systolic.  She was initiated on pressor support.  Laboratory evaluation noting ABG pH <6.7, pCO2 71, pO2 13, bicarb 9.6 on 100%.  CMP with K 5.3, BUN 28, creatinine 1.46, glucose 240, , , lactic acid 14.  CRP 0.45, INR 1.32, WBC 12.3, hgb 9.6.  Chest x-ray showed likely scarring or atelectasis of the left lung base.  Noncontrast CT of the head showed no acute intracranial process.  CT angiogram of the chest was negative for pulmonary embolism or aortic aneurysm.  Showed acute fractures of the left 5-7th anterior ribs and right anterior sixth and seventh ribs.  No pneumothorax.  T3 and T12 compression fractures of indeterminate age noted.  CT of the abdomen and pelvis with contrast showed very large amount of stool in the rectum with mild rectal wall thickening.  Stomach is  distended with fluid.  Gallbladder not identified.  Intra and extrahepatic biliary dilatation without choledocholithiasis noted.  L4 and L5 compression fractures of indeterminate age.  She was subsequently admitted to the primary medicine service requiring ICU level of admission.  Pulmonology consulted in addition to neurology for tonic clonic like seizures.  Subsequently patient initiated on Keppra and Fosphenytoin, EEG recommended.  Ultimately concerns for anoxic encephalopathy.  Palliative care consulted to further review GOC in the setting of complex medical decision making.      After speaking with the primary medicine service, palliative team me with family at bedside.  Patient continues on mechanical ventilation.  Propofol discontinued overnight.  Pressor support and versed has also been discontinued.  She is on  precedex drip at 0.2 mcg/hr.  Daughter and granddaughter are at bedside.  Initially, GOC discussions scheduled for today, however patient's daughter has been ill and unable to be present today.  Detailed GOC discussions requested to be deferred until her children can be present together.      I spoke with daughter and granddaughter regarding patient's functional status prior to acute event.  They describe largely total care with all ADLs.  She is non-ambulatory, requiring significant assistance with transfers.  She is able to sit in a wheelchair and continues to enjoy going for car rides.  Verbal responses are limited.  She requires assistance with meals, daughter noting some slow weight loss over the past few months.  She is incontinent of bowel/bladder at baseline.  Patient is , spouse passed 22 years prior.  She has 11 children, 9 of which have been visiting at bedside.  She lives with her family, transitioning from one household to another every few months.  They note that patient had functional decline since stroke, however reflect on the progression of her underlying dementia.  They are  agreeable to ongoing palliative follow and discussions regarding GOC.        Review of Systems   Unable to perform ROS: Intubated        Medical History:  Past Medical History:   Diagnosis Date    Dysphagia     Hypertension     Stroke 2013      History reviewed. No pertinent surgical history.  History reviewed. No pertinent family history.  Allergies: Patient has no known allergies.    Hospital Scheduled Medications:    Current Facility-Administered Medications:     sennosides-docusate (PERICOLACE) 8.6-50 MG per tablet 2 tablet, 2 tablet, Nasogastric, BID **AND** polyethylene glycol (MIRALAX) packet 17 g, 17 g, Nasogastric, Daily PRN **AND** [DISCONTINUED] bisacodyl (DULCOLAX) EC tablet 5 mg, 5 mg, Oral, Daily PRN **AND** bisacodyl (DULCOLAX) suppository 10 mg, 10 mg, Rectal, Daily PRN, Sharath Monae MD    chlorhexidine (PERIDEX) 0.12 % solution 15 mL, 15 mL, Mouth/Throat, Q12H, Hugo Mccormick DO, 15 mL at 09/26/24 0834    dexmedetomidine (PRECEDEX) 400 mcg in 100 mL NS infusion, 0.2 mcg/kg/hr, Intravenous, Titrated, Cheikh Kiran MD    dextrose 5 % with KCl 20 mEq/L infusion, 100 mL/hr, Intravenous, Continuous, Sharath Monae MD, Last Rate: 100 mL/hr at 09/26/24 0525, 100 mL/hr at 09/26/24 0525    Enoxaparin Sodium (LOVENOX) syringe 30 mg, 30 mg, Subcutaneous, Q24H, Hugo Mccormick DO, 30 mg at 09/26/24 0404    fosphenytoin (Cerebyx) 100 mg PE in sodium chloride 0.9 % 50 mL IVPB, 100 mg PE, Intravenous, Q8H, Cheikh Kiran MD    fosphenytoin (Cerebyx) 720 mg PE in sodium chloride 0.9 % 100 mL IVPB, 720 mg PE, Intravenous, Once, Cheikh Kiran MD    levETIRAcetam in NaCl 0.75% (KEPPRA) IVPB 1,000 mg, 1,000 mg, Intravenous, Q12H, Cheikh Kiran MD, 1,000 mg at 09/26/24 0865    nitroglycerin (NITROSTAT) SL tablet 0.4 mg, 0.4 mg, Sublingual, Q5 Min PRN, Hugo Mccormick DO    pantoprazole (PROTONIX) injection 40 mg, 40 mg, Intravenous, Q24H, Hugo Mccormick DO, 40 mg at  "09/26/24 0834    Pharmacy to Dose enoxaparin (LOVENOX), , Does not apply, Continuous PRN, Hugo Mccormick,     Pharmacy to Dose Zosyn, , Does not apply, Continuous PRN, Hugo Mccormick DO    piperacillin-tazobactam (ZOSYN) IVPB 4.5 g IVPB in 100 mL NS (VTB), 4.5 g, Intravenous, Q8H, Sharath Monae MD, 4.5 g at 09/26/24 0833    sodium chloride 0.9 % flush 10 mL, 10 mL, Intravenous, Q12H, Hugo Mccormick, , 10 mL at 09/26/24 0834    sodium chloride 0.9 % flush 10 mL, 10 mL, Intravenous, PRN, Hugo Mccormick,     sodium chloride 0.9 % infusion 40 mL, 40 mL, Intravenous, PRN, Hugo Mccormick, DO  I have utilized all immediate resources to obtain, update, or review the patient's current medications (including all prescription, over-the-counter products, herbals, and/or nutritional supplements).      Vitals: /65   Pulse 86   Temp 98.9 °F (37.2 °C) (Oral)   Resp 22   Ht 139.7 cm (55\")   Wt 48.1 kg (106 lb 0.7 oz)   SpO2 100%   BMI 24.65 kg/m²     Intake/Output Summary (Last 24 hours) at 9/26/2024 0947  Last data filed at 9/26/2024 0930  Gross per 24 hour   Intake 3963.39 ml   Output 2415 ml   Net 1548.39 ml       Physical Exam  Vitals and nursing note reviewed.   Constitutional:       General: She is not in acute distress.     Appearance: She is well-developed. She is ill-appearing. She is not diaphoretic.      Interventions: She is sedated and intubated.   HENT:      Head: Normocephalic and atraumatic.      Mouth/Throat:      Mouth: Mucous membranes are moist.      Pharynx: Oropharynx is clear.      Comments: ET tube in place  Eyes:      Conjunctiva/sclera: Conjunctivae normal.      Pupils: Pupils are equal, round, and reactive to light.   Cardiovascular:      Rate and Rhythm: Normal rate and regular rhythm.   Pulmonary:      Effort: She is intubated.      Breath sounds: Normal breath sounds.      Comments: MV  Abdominal:      General: There is no distension.      Palpations: Abdomen " is soft.   Musculoskeletal:         General: No swelling.      Cervical back: Neck supple.   Skin:     General: Skin is warm and dry.      Capillary Refill: Capillary refill takes less than 2 seconds.   Neurological:      Comments: Unable to fully assess    Psychiatric:      Comments: Calm affect, does not appear agitated or restless           Diagnostics Review:  Laboratory Data:  Results from last 7 days   Lab Units 09/26/24  0506 09/25/24  0412 09/24/24  1828   SODIUM mmol/L 145 150* 143   POTASSIUM mmol/L 3.1* 3.1* 5.3*   CHLORIDE mmol/L 103 104 104   CO2 mmol/L 30.3* 30.8* 13.8*   BUN mg/dL 30* 39* 28*   CREATININE mg/dL 1.33* 1.23* 1.46*   CALCIUM mg/dL 8.0* 9.4 9.6   ALBUMIN g/dL 3.0* 3.6 3.6   BILIRUBIN mg/dL 0.4 0.4 0.3   ALK PHOS U/L 155* 231* 135*   ALT (SGPT) U/L 150* 235* 152*   AST (SGOT) U/L 202* 339* 197*   GLUCOSE mg/dL 155* 215* 240*     Results from last 7 days   Lab Units 09/26/24  0506 09/25/24  0412 09/24/24  1828   WBC 10*3/mm3 15.26* 18.45* 12.31*   HEMOGLOBIN g/dL 7.8* 9.5* 9.6*   HEMATOCRIT % 24.7* 29.5* 33.3*   PLATELETS 10*3/mm3 243 302 250     Results from last 7 days   Lab Units 09/24/24  1828   INR  1.32*     Results from last 7 days   Lab Units 09/26/24  0724   PH, ARTERIAL pH units 7.516*   PO2 ART mm Hg 113.0*   PCO2, ARTERIAL mm Hg 39.3   HCO3 ART mmol/L 31.8*     Results from last 7 days   Lab Units 09/24/24  2040   COLOR UA  Yellow   GLUCOSE UA  Negative   KETONES UA  Negative   BLOOD UA  Moderate (2+)*   LEUKOCYTES UA  Small (1+)*   PH, URINE  6.0   BILIRUBIN UA  Negative   UROBILINOGEN UA  0.2 E.U./dL     Pain Management Panel           No data to display              Results from last 7 days   Lab Units 09/24/24  1852   BLOODCX  No growth at 24 hours  No growth at 24 hours     Nutritional Status:   Results from last 7 days   Lab Units 09/26/24  0506 09/25/24  0412 09/24/24  1828   ALBUMIN g/dL 3.0* 3.6 3.6     Body mass index is 24.65 kg/m².  Documented weights    09/24/24  1808 09/24/24 2315 09/25/24 0600 09/26/24 0545   Weight: 49 kg (108 lb) 47.4 kg (104 lb 8 oz) 47.4 kg (104 lb 8 oz) 48.1 kg (106 lb 0.7 oz)        Radiology:  XR Chest 1 View    Result Date: 9/26/2024   PROCEDURE: XR CHEST 1 VW-  HISTORY: Resp Failure.; R09.2-Respiratory arrest; T17.308A-Unspecified foreign body in larynx causing other injury, initial encounter  COMPARISON: 2 days prior.  FINDINGS: The heart is normal in size. The mediastinum is unremarkable. Decreased inspiratory effort noted with crowding of the lung markings in both lung bases. No area of consolidation is seen. There is mild interstitial disease similar to prior. Left internal jugular catheter and ET tube are in stable position. NG tube placed since the prior exam. Tip and sideport are located in the fundus of the stomach. Previously described gaseous distention of the stomach has resolved. There is no pneumothorax.  There are no acute osseous abnormalities.      Interval placement of NG tube with resolved gaseous distention of the stomach..  Stable ET tube and left internal jugular catheter from prior..  No acute infiltrate seen.    This report was signed and finalized on 9/26/2024 7:21 AM by Ana Pacheco MD.      XR Abdomen KUB    Result Date: 9/25/2024  PROCEDURE: XR ABDOMEN KUB-  INDICATION:  NG tube insertion verification; R09.2-Respiratory arrest; T17.308A-Unspecified foreign body in larynx causing other injury, initial encounter  COMPARISON: 1 day prior.  FINDINGS:  A supine view of the abdomen was obtained.  The bowel gas pattern is normal.  There are no pathologic calcifications.  No acute osseous abnormality is identified. NG tube has been advanced since the prior exam. Tip and the sideport are now located in the fundus of the stomach. Previously described gaseous distention of the stomach has resolved. Moderate amount of stool identified in the rectosigmoid, fecal impaction not excluded.      Interval advancement NG tube now in good  position with resolution of previous gaseous distention of the stomach..     This report was signed and finalized on 9/25/2024 11:02 AM by Ana Pacheco MD.      XR Abdomen KUB    Result Date: 9/25/2024  PROCEDURE: XR ABDOMEN KUB-  INDICATION:  NG placement; R09.2-Respiratory arrest; T17.308A-Unspecified foreign body in larynx causing other injury, initial encounter  COMPARISON:  None.  FINDINGS:  A supine view of the abdomen was obtained. There is gaseous distention of the stomach as seen on CT earlier the same day. As seen on prior CT there is a NG tube with the tip in the fundus of the stomach. The sideport is located in the distal esophagus; recommend advancement by 4 to 5 cm..  There are no pathologic calcifications.  No acute osseous abnormality is identified. There is evidence of old calcified granulomatous disease. Left internal jugular catheter identified with tip in the right atrium.      NG tube tip present in the fundus of the stomach with sideport in the distal esophagus; recommend advancement of NG tube by 4 to 5 cm..  Partially visualized left internal jugular catheter.     This report was signed and finalized on 9/25/2024 9:38 AM by Ana Pacheco MD.      CT Abdomen Pelvis With Contrast    Result Date: 9/24/2024  FINAL REPORT TECHNIQUE: null CLINICAL HISTORY: cardiac arrest COMPARISON: null FINDINGS: Exam: CT Abdomen and Pelvis with contrast Comparison: None Clinical history: Cardiac arrest Findings: NG tube tip in the stomach. Fluid in the distal esophagus may be reflective of gastroesophageal reflux. Stomach is distended with fluid. Mild bibasilar atelectasis. Liver does not demonstrate any focal lesions. Gallbladder not identified and is likely surgically absent. Enlargement of the Intrahepatic biliary ducts, common hepatic duct, common bile duct and pancreatic duct. No choledocholithiasis identified. Findings may be due to pancreatic head lesion, ampullary stricture/lesion, sphincter dysfunction or  post cholecystectomy status. No pancreatic head lesion identified on today's exam. Spleen has a normal appearance. No acute pancreatitis. Normal adrenal glands. No renal stones. Symmetric enhancement of the kidneys bilaterally. No CT evidence for pyelonephritis. No abdominal aortic aneurysm or dissection. Atherosclerotic vascular disease noted. . No small bowel obstruction or ileus. The cecum ascending colon and transverse colon are distended with gas, fluid and stool. Very large amount of stool in the rectum with rectum distended to 8.7 x 9 x 16.4 cm. Mild rectal wall thickening with perirectal inflammation reflective of a stercoral colitis. The urinary bladder and uterus are deviated ventrally secondary to the very large amount of stool in the rectum. Urinary bladder is decompressed with a Butler catheter.. Trace amount of free fluid in the deep pelvis. L4 compression fracture with 1/3 loss of vertebral body height. Mild L5 compression fracture with 25% loss of vertebral body height. Hemangioma at L2. Bilateral L5 pars defects with grade 1 anterior subluxation. Degenerative disc and endplate disease at L4-5 and L5-S1. Mild bilateral hip arthritis.     Impression: 1. Very large amount of stool in the rectum with rectum distended to 8.7 x 9 x 16.4 cm. Mild rectal wall thickening with perirectal inflammation reflective of a stercoral colitis. The cecum ascending colon and transverse colon are distended with gas, fluid and stool. Moderate stool in the left colon. 2. NG tube tip in the stomach. Fluid in the distal esophagus may be reflective of gastroesophageal reflux. Stomach is distended with fluid. 3. Gallbladder not identified and is likely surgically absent. 4. Intra and extrahepatic biliary dilatation with no choledocholithiasis identified. Findings may be due to pancreatic head lesion, ampullary stricture/lesion, sphincter dysfunction or post cholecystectomy status. No pancreatic head lesion identified on today's  exam. MR/MRCP could be considered for further evaluation. 5. Urinary bladder is decompressed with a Butler catheter.. 6. L4 and L5 compression fractures of indeterminate age. Please correlate with physical exam. Bilateral L5 pars defects. Degenerative disease of the lumbar spine. Authenticated and Electronically Signed by Sarah Peralta MD on 09/24/2024 09:29:37 PM    CT Angiogram Chest    Result Date: 9/24/2024  FINAL REPORT TECHNIQUE: null CLINICAL HISTORY: respiratory arrest COMPARISON: null FINDINGS: Exam: CTA Chest with IV contrast. Procedure: Coronal and sagittal MIP reformats were performed Comparison: None Clinical history: Cardiac arrest Findings: No pulmonary emboli. No thoracic aortic aneurysm or dissection. No hilar or mediastinal adenopathy. No pericardial fluid collection. No pleural fluid collection. Acute fractures of the left 5, 6 and 7 anterior ribs. Acute fractures of the right anterior 5 and 6 ribs. No pneumothorax. Mild bibasilar atelectasis. The right upper arm is only partially visualized. Low-density ill-defined fluid and air seen in the soft tissues of the right upper arm. T3 compression fracture with 10% loss of vertebral body height. Age of the fracture is indeterminate. T12 compression fracture with 25% loss of vertebral body height. Age of the fracture is indeterminate.     Impression: 1. No pulmonary emboli. 2. No thoracic aortic aneurysm or dissection 3. Acute fractures of the left 5th-7th anterior ribs and right anterior 6th and 7th ribs. No pneumothorax. Mild bibasilar atelectasis. 4. The right upper arm is only partially visualized. Low-density ill-defined fluid and air seen in the soft tissues of the right upper arm. Please correlate clinically. 5. T3 and T12 compression fractures of indeterminate age. Please correlate with physical exam. MRI could be considered for further evaluation. Authenticated and Electronically Signed by Sarah Peralta MD on 09/24/2024 09:21:12 PM    CT  Head Without Contrast    Result Date: 9/24/2024  FINAL REPORT TECHNIQUE: null CLINICAL HISTORY: post code COMPARISON: null FINDINGS: CT HEAD WITHOUT CONTRAST Comparison: None Findings: There is motion artifact. No acute intracranial hemorrhage, extra-axial fluid collection, hydrocephalus or midline shift. Age appropriate generalized parenchymal atrophy. There are periventricular and subcortical white matter hypodensities which are nonspecific but most likely related to microangiopathic gliosis. Intracranial arteriosclerosis. There is no sinus or mastoid fluid. Visualized orbits: No acute abnormalities. There is no acute fracture.     IMPRESSION: 1. No acute intracranial process. Authenticated and Electronically Signed by Becki Franklin DO on 09/24/2024 09:01:33 PM    XR Chest 1 View    Result Date: 9/24/2024  FINAL REPORT TECHNIQUE: null CLINICAL HISTORY: Severe Sepsis triage protocol ET and central line placement COMPARISON: null FINDINGS: 1 view chest x-ray Comparison: None Findings: Endotracheal tube distal tip is positioned 2.5 cm superior to the claude. A left central line crosses midline with distal tip terminating at the level of the right atrium. A percutaneous pacer overlies the superior lateral right hemithorax. Linear scarring or atelectasis involving the left lung base. A large granuloma of the periphery of the mid left lung measures 1.4 cm. No focal consolidation or large pleural effusion. No pneumothorax. The heart size is normal. Retrocardiac atelectasis. No acute fractures. The stomach lumen is gas-filled and dilated.     IMPRESSION: 1. Endotracheal tube terminates 2.5 cm superior to the claude. Left central line crosses midline with distal tip overlying the expected location of the right atrium. 2. Likely scarring or atelectasis of the left lung base including the retrocardiac region without acute process. 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  Acute hypoxic and hypercapnic respiratory failure   Aspiration pneumonitis  Acute renal failure  Acute ischemic hepatitis  Stercoral colitis  Severe metabolic acidosis and lactic acidsos  Hypernatremia  Alzheimer's dementia  History of CVA  Impaired mobility and ADLs    Recommendations/Plan:  - CODE STATUS reviewed: FULL CODE   - Family have requested to defer GOC discussions until all her children are able to be present; will follow up with them tomorrow  - Neurology following, EEG noting no ongoing seizure activity  - Appears that at baseline patient with advanced dementia, family describing at least FAST 7a with PPS 40%  - Difficult to prognosticate, however patient remains critically ill with high risk for acute decompensation   - Palliative care will continue to follow/support patient and family        I appreciate the opportunity to participate in the care of Ms. Annabel Hyman.  Please do not hesitate to contact me with any questions or concerns.      I spent 45 total minutes conducting chart review for relevant information, face to face assessment, counseling patient and/or family, and coordination of care.    Part of this note may be an electronic transcription/translation of spoken language to printed text using the Dragon Dictation System.       Kelsie Kang PA-C  09/26/24  09:47 EDT

## 2024-09-27 NOTE — CASE MANAGEMENT/SOCIAL WORK
DCP; Pending course of recovery. Pt remains intubated with a poor prognosis. Palliative Care team involved. No immediate needs. CM continues to follow.

## 2024-09-27 NOTE — PROGRESS NOTES
"Pharmacy Consult-Vancomycin Dosing  Annabel Hyman is a  84 y.o. female receiving vancomycin therapy.     Indication: Pneumonia  Consulting Provider: Dr. Monae    Goal Trough:    Current Antimicrobial Therapy  Anti-Infectives (From admission, onward)      Ordered     Dose/Rate Route Frequency Start Stop    09/27/24 1217  Pharmacy to dose vancomycin        Ordering Provider: Sharath Monae MD     Does not apply Continuous PRN 09/27/24 1217 09/30/24 1216    09/25/24 0612  piperacillin-tazobactam (ZOSYN) IVPB 4.5 g IVPB in 100 mL NS (VTB)        Ordering Provider: Sharath Monae MD    4.5 g  over 4 Hours Intravenous Every 8 Hours 09/25/24 0800 09/30/24 0759    09/25/24 0045  piperacillin-tazobactam (ZOSYN) IVPB 3.375 g IVPB in 100 mL NS (VTB)        Ordering Provider: Hugo Mccormick DO    3.375 g  over 30 Minutes Intravenous Once 09/25/24 0145 09/25/24 0217    09/25/24 0004  Pharmacy to Dose Zosyn        Ordering Provider: Hugo Mccormick DO     Does not apply Continuous PRN 09/25/24 0002 09/30/24 0001            Allergies  Allergies as of 09/24/2024    (No Known Allergies)       Labs    Results from last 7 days   Lab Units 09/27/24  0716 09/26/24  0506 09/25/24  0412   BUN mg/dL 23 30* 39*   CREATININE mg/dL 1.16* 1.33* 1.23*       Results from last 7 days   Lab Units 09/27/24  0716 09/26/24  0506 09/25/24  0412   WBC 10*3/mm3 10.82* 15.26* 18.45*       Evaluation of Dosing     Last Dose Received in the ED/Outside Facility: None  Is Patient on Dialysis or Renal Replacement: No    Ht - 139.7 cm (55\")  Wt - 47.6 kg (104 lb 15 oz)    Estimated Creatinine Clearance: 26.2 mL/min (A) (by C-G formula based on SCr of 1.16 mg/dL (H)).    Intake & Output (last 3 days)         09/24 0701 09/25 0700 09/25 0701  09/26 0700 09/26 0701 09/27 0700 09/27 0701 09/28 0700    I.V. (mL/kg) 1569.5 (33.1) 3583.4 (74.5) 3655 (76.8)     NG/GT  80 293 100    IV Piggyback 100 300      Total Intake(mL/kg) 1669.5 (35.2) 3963.4 " (82.4) 3948 (82.9) 100 (2.1)    Urine (mL/kg/hr) 725 1300 (1.1) 1495 (1.3)     Emesis/NG output  900      Stool 500 0      Total Output 1225 2200 1495     Net +444.5 +1763.4 +2453 +100            Stool Unmeasured Occurrence 1 x 1 x              Microbiology and Radiology  Microbiology Results (last 10 days)       Procedure Component Value - Date/Time    Urine Culture - Urine, Urine, Catheter [[id number]]  (Abnormal)  (Susceptibility) Collected: 09/24/24 2040    Lab Status: Final result Specimen: Urine, Catheter Updated: 09/27/24 1114     Urine Culture >100,000 CFU/mL Klebsiella pneumoniae ssp pneumoniae    Narrative:      Colonization of the urinary tract without infection is common. Treatment is discouraged unless the patient is symptomatic, pregnant, or undergoing an invasive urologic procedure.    Susceptibility        Klebsiella pneumoniae ssp pneumoniae      HILARIA      Amoxicillin + Clavulanate <=2 ug/ml Susceptible      Ampicillin  Resistant      Ampicillin + Sulbactam <=2 ug/ml Susceptible      Cefazolin <=4 ug/ml Susceptible      Cefepime <=1 ug/ml Susceptible      Ceftazidime <=1 ug/ml Susceptible      Ceftriaxone <=1 ug/ml Susceptible      Gentamicin <=1 ug/ml Susceptible      Levofloxacin <=0.12 ug/ml Susceptible      Nitrofurantoin 64 ug/ml Intermediate      Piperacillin + Tazobactam <=4 ug/ml Susceptible      Trimethoprim + Sulfamethoxazole <=20 ug/ml Susceptible                           Blood Culture - Blood, Arm, Left [[id number]]  (Normal) Collected: 09/24/24 1852    Lab Status: Preliminary result Specimen: Blood from Arm, Left Updated: 09/26/24 1900     Blood Culture No growth at 2 days    Blood Culture - Blood, Wrist, Right [[id number]]  (Normal) Collected: 09/24/24 1852    Lab Status: Preliminary result Specimen: Blood from Wrist, Right Updated: 09/26/24 1900     Blood Culture No growth at 2 days            Vancomycin Levels:                      Assessment/Plan    Pharmacy to dose vancomycin for  Pneumonia. Goal trough 15-20 mcg/mL.  Patient currently receiving vancomycin dosed per level. Will order vancomycin 1000 mg for one dose.  Assess clearance by vancomycin random level on 9/28 @ 0600.  Pharmacy will continue to monitor renal function, cultures and sensitivities, and clinical status to adjust regimen as necessary.    Thank you,  Monica Peters, PharmD  09/27/24 12:22 EDT

## 2024-09-27 NOTE — PROGRESS NOTES
"    [facility]     PALLIATIVE CARE FOLLOW UP NOTE    Name:  Annabel Hyman   Age:  84 y.o.  Sex:  female  :  1940  MRN:  [id number]   Visit Number:  [id number]  Date Of Service:  24  Primary Care Physician:  Provider, No Known    Chief Complaint: cardiac arrest and suspected anoxic encephalopathy    Interval History:    Patient seen today during palliative care team rounds.  Record reviewed and case discussed with staff.  She continues on low dose of precedex. Unfortunately developed hypotension and has required initiation of pressor support.   Febrile overnight.  Neurology has followed, no additional seizures, CT imaging preformed today concerning for anoxic brain injury.     Spoke with family at bedside along with Dr. Monae.  Not all family have arrived at bedside today for anticipated GOC discussions.  However, upon review of CODE STATUS, family confirm that all children agree that she would not desire to have resuscitation given all information and recent findings.  I encouraged ongoing reflection regarding patient's wishes, if she was able to understand the magnitude of her clinical situation.  I further recommended ongoing discussions with family about desire to pursue additional interventions/procedure.  I offered ongoing supportive and empathetic listening, encouraging them to reach out with any questions/concerns as they may arise.          Review of Systems   Unable to perform ROS: Intubated          Pain Assessment  CPOT Facial Expression: 1-->tense  CPOT Body Movements: 1-->protection  CPOT Muscle Tension: 0-->relaxed  Ventilator Compliance/Vocalization: 0-->tolerating ventilator or movement  CPOT Score: 2  Vitals: /58   Pulse 70   Temp 98 °F (36.7 °C) (Oral)   Resp 20   Ht 139.7 cm (55\")   Wt 47.6 kg (104 lb 15 oz)   SpO2 100%   BMI 24.39 kg/m²     Physical Exam  Vitals and nursing note reviewed.   Constitutional:       General: She is not in acute distress.   "   Appearance: She is ill-appearing. She is not diaphoretic.      Interventions: She is sedated and intubated.      Comments: Frail appearing, elderly female lying in bed   HENT:      Head: Normocephalic and atraumatic.      Nose:      Comments: NG noted     Mouth/Throat:      Comments: ETT noted  Cardiovascular:      Rate and Rhythm: Normal rate and regular rhythm.   Pulmonary:      Effort: Pulmonary effort is normal. No respiratory distress. She is intubated.      Comments: MV  Musculoskeletal:      Cervical back: Neck supple.   Skin:     General: Skin is warm and dry.      Capillary Refill: Capillary refill takes less than 2 seconds.   Neurological:      Comments: Unable to fully assess    Psychiatric:      Comments: Calm affect, does not appear agitated or restless          Results Reviewed:    Intake/Output Summary (Last 24 hours) at 9/27/2024 1642  Last data filed at 9/27/2024 1300  Gross per 24 hour   Intake 4098 ml   Output 1330 ml   Net 2768 ml     Results from last 7 days   Lab Units 09/27/24  0716   SODIUM mmol/L 133*   POTASSIUM mmol/L 3.3*   CHLORIDE mmol/L 98   CO2 mmol/L 24.1   BUN mg/dL 23   CREATININE mg/dL 1.16*   CALCIUM mg/dL 7.7*   BILIRUBIN mg/dL 0.3   ALK PHOS U/L 115   ALT (SGPT) U/L 94*   AST (SGOT) U/L 179*   GLUCOSE mg/dL 158*     Results from last 7 days   Lab Units 09/27/24  0716   WBC 10*3/mm3 10.82*   HEMOGLOBIN g/dL 7.2*   HEMATOCRIT % 23.0*   PLATELETS 10*3/mm3 176       Medication Review:   I have reviewed the patients active and prn medications.     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  Acute hypoxic and hypercapnic respiratory failure   Aspiration pneumonitis  Acute renal failure  Acute ischemic hepatitis  Stercoral colitis  Severe metabolic acidosis and lactic acidsos  Hypernatremia  Alzheimer's dementia  History of CVA  Impaired mobility and ADLs    Recommendations/Plan:  - GOC unclear at the present, awaiting all family to  gather at bedside for discussions  - I reviewed CODE STATUS, daughter confirming that siblings have come to the agreement that patient would not desire to have resuscitation, will update to DO NOT RESUSCITATE  - I have encouraged ongoing reflection of what patient would desire, given the magnitude of her clinical condition  - Discussed briefly different scenarios with daughter, including no further escalation of care; at the present family are undecided and no official decisions have been made  - Appears that at baseline patient with advanced dementia, family describing at least FAST 7a with PPS 40%  - Difficult to prognosticate, however patient remains critically ill with high risk for acute decompensation  - Palliative care will continue to follow/support patient and family    CODE STATUS:   Code Status and Medical Interventions: No CPR (Do Not Attempt to Resuscitate); Full Support   Ordered at: 09/27/24 6725     Level Of Support Discussed With:    Next of Kin (If No Surrogate)     Code Status (Patient has no pulse and is not breathing):    No CPR (Do Not Attempt to Resuscitate)     Medical Interventions (Patient has pulse or is breathing):    Full Support         I spent 55 total minutes, including face to face assessment, record review, coordination of care with staff, and counseling patient and/or family  Part of this note may be an electronic transcription/translation of spoken language to printed text using the Dragon Dictation System.    Kelsie Kang PA-C  09/27/24  16:42 EDT

## 2024-09-27 NOTE — PROGRESS NOTES
"  CC: Acute Respiratory Failure.  Status post cardiac arrest    S: Intubated and sedated.     ROS: Could not be obtained as the patient is on mechanical ventilator.    O:Vital signs reviewed.    /56   Pulse 80   Temp 99.5 °F (37.5 °C) (Rectal)   Resp 20   Ht 139.7 cm (55\")   Wt 47.6 kg (104 lb 15 oz)   SpO2 100%   BMI 24.39 kg/m²     Temp (24hrs), Av.1 °F (38.4 °C), Min:99.3 °F (37.4 °C), Max:103.4 °F (39.7 °C)      Vent Day # 3  FiO2: 35 %.   PEEP: 5  Peak Pressure: 16    CVP Line. Day # 3.   Butler catheter present.   NG/OG tube present.     I & Os reviewed.   Intake/Output         24 0700 - 24 0659 24 0700 - 24 0659    Intake (ml) 3948 100    Output (ml) 1495 --    Net (ml) 2453 100    Last Weight 47.6 kg (104 lb 15 oz) --            Net IO Since Admission: 4,760.89 mL [24 1002]    General/Constitutional: Intubated.  Sedated  Eyes: PERRL. Eyes were closed  Neck:  Supple with out obvious JVD. No obvious masses noted.   Cardiovascular: S1 + S2.  Regular  Lungs/Respiratory: Transmitted Breath sounds noted.  No wheezing heard.  No significant crackles noted  GI/Abdomen: Soft. Bowel sounds positive.  No obvious organomegaly noted.  Musculoskeletal/Extremities: No edema noted. Gait could not be assessed at this time, as the patient was laying in bed.   Neurologic: Sedated. Intubated.       Labs: Reviewed.   Results from last 7 days   Lab Units 24  0716 24  0506 24  0412 24  1828   WBC 10*3/mm3 10.82* 15.26* 18.45* 12.31*   HEMOGLOBIN g/dL 7.2* 7.8* 9.5* 9.6*   HEMATOCRIT % 23.0* 24.7* 29.5* 33.3*   PLATELETS 10*3/mm3 176 243 302 250   NEUTROPHIL % % 79.9* 83.4*  --   --    NEUTROS ABS 10*3/mm3 8.65* 12.74*  --  4.19   EOSINOPHIL % % 0.5 0.2*  --   --    EOS ABS 10*3/mm3 0.05 0.03  --  0.62*   LYMPHOCYTE % % 14.5* 11.1*  --   --    LYMPHS ABS 10*3/mm3 1.57 1.69  --   --        No results found for: \"PROCALCITO\"    Lab Results   Component Value Date " "   CRP 0.45 09/24/2024       No results found for: \"SEDRATE\"    No results found for: \"PROBNP\"    Results from last 7 days   Lab Units 09/27/24  0716 09/26/24  0506 09/25/24  0412   SODIUM mmol/L 133* 145 150*   POTASSIUM mmol/L 3.3* 3.1* 3.1*   CHLORIDE mmol/L 98 103 104   CO2 mmol/L 24.1 30.3* 30.8*   BUN mg/dL 23 30* 39*   CREATININE mg/dL 1.16* 1.33* 1.23*   CALCIUM mg/dL 7.7* 8.0* 9.4   ANION GAP mmol/L 10.9 11.7 15.2*   BILIRUBIN mg/dL 0.3 0.4 0.4   ALK PHOS U/L 115 155* 231*   ALT (SGPT) U/L 94* 150* 235*   AST (SGOT) U/L 179* 202* 339*   GLUCOSE mg/dL 158* 155* 215*   TOTAL PROTEIN g/dL 5.4* 5.9* 6.7   ALBUMIN g/dL 2.6* 3.0* 3.6       Results from last 7 days   Lab Units 09/25/24  0412 09/24/24  1828   MAGNESIUM mg/dL 3.0* 2.2   PHOSPHORUS mg/dL 3.3 8.0*       No results found for: \"TSH\"    No results found for: \"FREET4\"    Results from last 7 days   Lab Units 09/24/24  1828   INR  1.32*       No results found for: \"CKTOTAL\"    No components found for: \"HSTROPT\"    No results found for: \"TROPONINT\"    No results found for: \"DDIMER\"    No results found for: \"LIPASE\"    Brief Urine Lab Results  (Last result in the past 365 days)        Color   Clarity   Blood   Leuk Est   Nitrite   Protein   CREAT   Urine HCG        09/24/24 2040 Yellow   Cloudy   Moderate (2+)   Small (1+)   Negative   100 mg/dL (2+)                     Micro: As of September 27, 2024   No results found for: \"RESPCX\"  No results found for: \"BCIDPCR\"  Lab Results   Component Value Date    BLOODCX No growth at 2 days 09/24/2024    BLOODCX No growth at 2 days 09/24/2024     Lab Results   Component Value Date    URINECX >100,000 CFU/mL Gram Negative Bacilli (A) 09/24/2024     No results found for: \"MRSACX\"  No results found for: \"MRSAPCR\"  No results found for: \"URCX\"  No components found for: \"LOWRESPCF\"  No results found for: \"THROATCX\"  No results found for: \"CULTURES\"  No components found for: \"STREPBCX\"  No results found for: " "\"STREPPNEUAG\"  No results found for: \"LEGIONELLA\"  No results found for: \"LEGANTIGENUR\"  No results found for: \"MYCOPLASCX\"  No results found for: \"GCCX\"  No results found for: \"WOUNDCX\"  No results found for: \"BODYFLDCX\"    No results found for: \"FLU\"    No results found for: \"ADENOVIRUS\"  No results found for: \"YD390B\"  No results found for: \"CVHKU1\"  No results found for: \"CVNL63\"  No results found for: \"CVOC43\"  No results found for: \"HUMETPNEVS\"  No results found for: \"HURVEV\"  No results found for: \"FLUBPCR\"  No results found for: \"PARAINFLUE\"  No results found for: \"PARAFLUV2\"  No results found for: \"PARAFLUV3\"  No results found for: \"PARAFLUV4\"  No results found for: \"BPERTPCR\"  No results found for: \"[id number]\"  No results found for: \"CPNEUPCR\"  No results found for: \"MPNEUMO\"  No results found for: \"FLUAPCR\"  No results found for: \"FLUAH3\"  No results found for: \"FLUAH1\"  No results found for: \"RSV\"  No results found for: \"BPARAPCR\"    COVID 19:  No results found for: \"COVID19\"        ABG:   Recent Labs     09/24/24  1759 09/24/24  1949 09/26/24  0724   PHART <6.767* 7.362 7.516*   IVV6WLF 70.5* 32.8* 39.3   PO2ART 113.0* 452.0* 113.0*   MBK9UCU 9.6* 18.6* 31.8*   BASEEXCESS -25.3* -6.1* 8.2*       Lab Results   Component Value Date    LACTATE 3.1 (C) 09/25/2024    LACTATE 4.8 (C) 09/25/2024    LACTATE 8.9 (C) 09/24/2024         Echo:       dexmedetomidine, 0.2 mcg/kg/hr, Last Rate: 0.6 mcg/kg/hr (09/27/24 0929)  dextrose 5 % with KCl 20 mEq, 100 mL/hr, Last Rate: 100 mL/hr (09/26/24 1508)  Pharmacy to Dose enoxaparin (LOVENOX),   Pharmacy to Dose Zosyn,           Imaging: Latest imaging study was reviewed personally.    Imaging Results (Last 24 Hours)       Procedure Component Value Units Date/Time    XR Chest 1 View [[id number]] Collected: 09/27/24 0734     Updated: 09/27/24 0738    Narrative:      PROCEDURE: XR CHEST 1 VW-     HISTORY: Resp Failure.; R09.2-Respiratory arrest; " "T17.308A-Unspecified  foreign body in larynx causing other injury, initial encounter     COMPARISON: 1 day prior.     FINDINGS: The heart is normal in size. The lungs are clear. The  mediastinum is unremarkable. There is no pneumothorax.  There are no  acute osseous abnormalities. There is evidence of old calcified  granulomatous disease.  ET tube present with the tip 2.4 cm above the claude. NG tube is stable  in position with the side port and tip in the fundus of the stomach.  Left IJ catheter is in stable position.       Impression:      Stable chest with stable position of support lines and  catheters..                 This report was signed and finalized on 2024 7:36 AM by Ana Pacheco MD.                 Assessment & Recommendations/Plan:   1.  Acute Respiratory Failure  Not stable for extubation today, due to continued encephalopathy and concern for anoxic brain injury.  Chest x-ray and ABG as clinically indicated.  ABG suggests respiratory alkalosis.  Adjustments to mechanical ventilation made as clinically needed.  Currently on Precedex.    2.  Status post cardiac arrest  Unknown amount of time     3.  Anoxic brain injury  Extremely high suspicion clinically.  Neurology following.    4.  Metabolic acidosis/shock liver  Metabolic acidosis seems to have improved  Liver enzymes are slowly decreasing  Will continue clinical follow-up    5.  Colitis/infection  On antibiotics per hospitalist service  Temp (24hrs), Av.1 °F (38.4 °C), Min:99.3 °F (37.4 °C), Max:103.4 °F (39.7 °C)  Temp (72hrs), Av.4 °F (38 °C), Min:97.1 °F (36.2 °C), Max:103.4 °F (39.7 °C)    No results found for: \"PROCALCITO\"  Lab Results   Component Value Date    WBC 10.82 (H) 2024    WBC 15.26 (H) 2024    WBC 18.45 (H) 2024     6.  Hematologic/anemia  Likely dilutional  We will continue to monitor closely  Lab Results   Component Value Date    HGB 7.2 (L) 2024    HGB 7.8 (L) 2024    HGB 9.5 (L) " "09/25/2024     Lab Results   Component Value Date     09/27/2024     09/26/2024     09/25/2024     Lab Results   Component Value Date    INR 1.32 (H) 09/24/2024     7.  Renal/Fluid status/Electrolytes  Lab Results   Component Value Date     (L) 09/27/2024     09/26/2024     (H) 09/25/2024    K 3.3 (L) 09/27/2024    K 3.1 (L) 09/26/2024    K 3.1 (L) 09/25/2024     Lab Results   Component Value Date    BUN 23 09/27/2024    BUN 30 (H) 09/26/2024    BUN 39 (H) 09/25/2024    CREATININE 1.16 (H) 09/27/2024    CREATININE 1.33 (H) 09/26/2024    CREATININE 1.23 (H) 09/25/2024     Net IO Since Admission: 4,760.89 mL [09/27/24 1002]  No results found for: \"PROBNP\"    8.  GI/nutrition  Nutrition per Dietician.   GI prophylaxis per admitting attending.    9.  DVT prophylaxis  Per admitting attending.    10.  Miscellaneous  Had a long discussion with the patient daughters at the bedside.  They are aware of the poor prognosis.    They also are considering further discussion with palliative care with regards to possibility of changing CODE STATUS and potential terminal extubation.    The patient remains at significant risk for organ dysfunction and damage, requiring multiple measures to support and sustain organ function.  Condition remains critical.    Critical Care time spent in direct patient care: 35 minutes including high complexity decision making to assess, manipulate, and support vital organ system failure in this individual who has impairment of one or more vital organ systems such that there is a high probability of imminent or life threatening deterioration in the patient’s condition.  This time includes multiple reassessments throughout the day, if needed and as appropriate.  This time excludes other billable procedures. Time does include preparation of documents, review of old records, coordinating care with other providers and direct bedside care.    I have discussed patient's " overall clinical status with nursing staff especially with regards to need for neurological assessment and my discussion with the family member at the bedside.    Plan was also discussed with nursing staff, as necessary.     This document was electronically signed by Vincent Castro MD on 09/27/24 at 10:02 EDT      Dictated utilizing Dragon dictation.

## 2024-09-27 NOTE — PROGRESS NOTES
"DOS: 2024  NAME: Annabel Hyman   : 1940  PCP: Provider, No Known  Chief Complaint   Patient presents with    Cardiac Arrest       Chief complaint: Patient is only on intravenous Precedex drip.  Subjective: She is responsive and reacts to painful stimuli to the neck by turning her head and also grimacing symmetrically on the face.  She also pulls up her right and left legs when pinched in the inner part of the thighs.  However she does not withdraw from noxious stimulation upon pinching her hands or fingers.  The doll's eye movements are present but the gag response was difficult to perform it was present as well.  No further rhythmic jerking spells noted so far.    Objective:  Vital signs: BP 96/42   Pulse 73   Temp 98.2 °F (36.8 °C) (Oral)   Resp 20   Ht 139.7 cm (55\")   Wt 47.6 kg (104 lb 15 oz)   SpO2 100%   BMI 24.39 kg/m²    Gen: NAD, vitals reviewed  MS: [place] Coma Scale is 5.  CN: Cranials 2-12: The pupils are equally reacting to light.  The doll's eye movements are present.  Patient has gag response to deep suctioning.  Grimaces to painful stimuli symmetrically on the face.  Motor: Does not withdraw to painful stimuli and upper extremities but withdraws by pulling up the legs in both lower extremities when the inner part of both the thighs are pinched.  Sensory: Appreciates noxious stimulation in the lower extremities by withdrawing the legs and also around the neck by turning the head to the left side and also grimacing symmetrically in the face.  Coordination: Could not be tested at this time.  Gait: Could not be tested at this time    ROS:  General: Patient seems to be little bit more responsive compared to initial presentation and no further rhythmic jerks noted.  Neurological: Patient seems to be slightly improved since initial presentation without any clinical seizures.    Laboratory results:  Lab Results   Component Value Date    GLUCOSE 158 (H) 2024    CALCIUM 7.7 (L) " "09/27/2024     (L) 09/27/2024    K 3.3 (L) 09/27/2024    CO2 24.1 09/27/2024    CL 98 09/27/2024    BUN 23 09/27/2024    CREATININE 1.16 (H) 09/27/2024    BCR 19.8 09/27/2024    ANIONGAP 10.9 09/27/2024     Lab Results   Component Value Date    WBC 10.82 (H) 09/27/2024    HGB 7.2 (L) 09/27/2024    HCT 23.0 (L) 09/27/2024    MCV 84.9 09/27/2024     09/27/2024     No results found for: \"LDL\"         Review of labs: The potassium is low at 3.3 and the EGFR is compromised at 46.     CMP:        Lab 09/27/24  0716 09/26/24  0506 09/25/24  0412 09/24/24  1828   SODIUM 133* 145 150* 143   POTASSIUM 3.3* 3.1* 3.1* 5.3*   CHLORIDE 98 103 104 104   CO2 24.1 30.3* 30.8* 13.8*   ANION GAP 10.9 11.7 15.2* 25.2*   BUN 23 30* 39* 28*   CREATININE 1.16* 1.33* 1.23* 1.46*   EGFR 46.0* 39.0* 42.9* 34.9*   GLUCOSE 158* 155* 215* 240*   CALCIUM 7.7* 8.0* 9.4 9.6   MAGNESIUM  --   --  3.0* 2.2   PHOSPHORUS  --   --  3.3 8.0*   TOTAL PROTEIN 5.4* 5.9* 6.7 7.0   ALBUMIN 2.6* 3.0* 3.6 3.6   GLOBULIN 2.8 2.9 3.1 3.4   ALT (SGPT) 94* 150* 235* 152*   AST (SGOT) 179* 202* 339* 197*   BILIRUBIN 0.3 0.4 0.4 0.3   BILIRUBIN DIRECT  --  <0.2  --   --    ALK PHOS 115 155* 231* 135*                  Review and interpretation of imaging: The portable chest x-ray shows the following:      FINDINGS: The heart is normal in size. The lungs are clear. The  mediastinum is unremarkable. There is no pneumothorax.  There are no  acute osseous abnormalities. There is evidence of old calcified  granulomatous disease.  ET tube present with the tip 2.4 cm above the claude. NG tube is stable  in position with the side port and tip in the fundus of the stomach.  Left IJ catheter is in stable position.     IMPRESSION:  Stable chest with stable position of support lines and  catheters..  CT Head Without Contrast    Result Date: 9/27/2024  PROCEDURE: CT HEAD WO CONTRAST-  HISTORY: anoxic brain injury; R09.2-Respiratory arrest; T17.308A-Unspecified " foreign body in larynx causing other injury, initial encounter  COMPARISON: September 24, 2024.  TECHNIQUE: Multiple axial CT images were performed from the foramen magnum to the vertex without enhancement.  FINDINGS: There is decrease in size of sulci. There is mild dilatation of the ventricles which has decreased. Gray-white matter differentiation is still seen. Findings may represent mild edema with anoxic brain injury in the differential diagnosis. There is fairly diffuse decreased attenuation in the cerebellum compatible with edema. Decreased sulci are also seen in the cerebellum, more prominent than in cerebrum.      Impression: Evidence of edema, more pronounced in the cerebellum than cerebrum, compatible with anoxic brain injury.   DLP: 520.06 mGy.cm CTDI: 28.77 mGy   This study was performed with techniques to keep radiation doses as low as reasonably achievable (ALARA). Individualized dose reduction techniques using automated exposure control or adjustment of mA and/or kV according to the patient size were employed.     Images were reviewed, interpreted, and dictated by Dr. Ana Pacheco MD Transcribed by Sarah Bruno PA-C.  This report was signed and finalized on 9/27/2024 1:42 PM by Ana Pacheco MD.      CT Head Without Contrast    Result Date: 9/24/2024  FINAL REPORT TECHNIQUE: null CLINICAL HISTORY: post code COMPARISON: null FINDINGS: CT HEAD WITHOUT CONTRAST Comparison: None Findings: There is motion artifact. No acute intracranial hemorrhage, extra-axial fluid collection, hydrocephalus or midline shift. Age appropriate generalized parenchymal atrophy. There are periventricular and subcortical white matter hypodensities which are nonspecific but most likely related to microangiopathic gliosis. Intracranial arteriosclerosis. There is no sinus or mastoid fluid. Visualized orbits: No acute abnormalities. There is no acute fracture.     Impression: IMPRESSION: 1. No acute intracranial process.  Authenticated and Electronically Signed by Becki Franklin DO on 09/24/2024 09:01:33 PM     CT Angiogram Chest    Result Date: 9/24/2024  FINAL REPORT TECHNIQUE: null CLINICAL HISTORY: respiratory arrest COMPARISON: null FINDINGS: Exam: CTA Chest with IV contrast. Procedure: Coronal and sagittal MIP reformats were performed Comparison: None Clinical history: Cardiac arrest Findings: No pulmonary emboli. No thoracic aortic aneurysm or dissection. No hilar or mediastinal adenopathy. No pericardial fluid collection. No pleural fluid collection. Acute fractures of the left 5, 6 and 7 anterior ribs. Acute fractures of the right anterior 5 and 6 ribs. No pneumothorax. Mild bibasilar atelectasis. The right upper arm is only partially visualized. Low-density ill-defined fluid and air seen in the soft tissues of the right upper arm. T3 compression fracture with 10% loss of vertebral body height. Age of the fracture is indeterminate. T12 compression fracture with 25% loss of vertebral body height. Age of the fracture is indeterminate.     Impression: Impression: 1. No pulmonary emboli. 2. No thoracic aortic aneurysm or dissection 3. Acute fractures of the left 5th-7th anterior ribs and right anterior 6th and 7th ribs. No pneumothorax. Mild bibasilar atelectasis. 4. The right upper arm is only partially visualized. Low-density ill-defined fluid and air seen in the soft tissues of the right upper arm. Please correlate clinically. 5. T3 and T12 compression fractures of indeterminate age. Please correlate with physical exam. MRI could be considered for further evaluation. Authenticated and Electronically Signed by Sarah Peralta MD on 09/24/2024 09:21:12 PM           Workup to date: The EEG was interpreted as follows:    Findings:     The patient is intubated.  Diffuse low amplitude theta and beta activity is present symmetrically over both hemispheres.  No lateralizing features are seen.  Photic stimulation does not change the  background.  IV pump artifact is prominent disease study proceeds, electrode artifact is variably prominent at times over the midline parietal leads.  No focal features or epileptiform activity are seen.  No electrographic seizures are present.     Video: None     Technical quality: Fair           SUMMARY:     Moderate generalized suppression and slow     No focal features or epileptiform activity are seen     IMPRESSION:     Diffuse cerebral dysfunction, at least moderate in degree     No ongoing seizures are seen            Diagnoses: Patient with most probable anoxic brain injury seems to be stable compared to initial presentation with resolution of the rhythmic jerks.      Comment: The trough phenytoin level was noted at 19.4 but with correction for low albumin and GFR it is possibly around 21 or 22.    Plan:  1.  Decreasing the Keppra from 1000 mg every 12 hours to 500 mg intravenous every 12 hours.  2.  Continue the fosphenytoin at 100 mg intravenous every 8 hours  3.  Daily trough phenytoin levels.  4.  Check comprehensive metabolic profile for tomorrow.    Discussed with the patient's family members and Dr. Sharath Monae and will be followed up by our inpatient teleneurology service.    Spent a total of 30 minutes in face-to-face evaluation and management of the patient using the dedicated telemedicine device without any interruption with the help of Adryan michel rounding nurse with the patient located at the [facility] and myself at a remote location.    Electronically signed by Cheikh Kiran MD, 09/27/24, 2:01 PM EDT.

## 2024-09-27 NOTE — PROGRESS NOTES
"Dietitian Follow-up    Patient Name: Annabel Hyman  YOB: 1940  MRN: [id number]  Admission date: 9/24/2024    Comment:    HOLD TF UNTIL MAP >65mmHg then resume low dose TF. Regimen listed below.     Rec#1: Initiate Peptamen AF @ 20mL/hr and do not advance  Rec#2: Free water flush 120mL q4hrs  Total TF regimen: 528kcals, 33g protein, 1077mL per day  Clinical Nutrition Follow-up   Encounter Information        Trending Narrative     9/27: Patient w/ 500mL NG-tube aspirate - will continue low dose TF. MAP score is 58mmHg - recommend holding TF regimen until MAP score is >65mmHg.     9/26: Patient remains NPO/intubated/sedated. RD consulted to provide enteral nutrition recommendations.      9/25: Patient with MST score of 2 - unsure of recent weight loss d/t chart review. Patient currently NPO w/ NG-tube - intubated and sedated. Propofol ordered 1.42-14.22mL/hr providing 38-375kcals/day. MAP is 121mmHg and lactate 3.1mmol/L. Will continue to follow-up and monitor.      Anthropometrics        Current Height, Weight Height: 139.7 cm (55\")  Weight: 47.6 kg (104 lb 15 oz) (09/27/24 0400)       Trending Weight Hx     This admission:              PTA:     Wt Readings from Last 30 Encounters:   09/27/24 0400 47.6 kg (104 lb 15 oz)   09/26/24 0545 48.1 kg (106 lb 0.7 oz)   09/25/24 0600 47.4 kg (104 lb 8 oz)   09/24/24 2315 47.4 kg (104 lb 8 oz)   09/24/24 1808 49 kg (108 lb)      BMI kg/m2 Body mass index is 24.39 kg/m².     Labs        Pertinent Labs Results from last 7 days   Lab Units 09/27/24  0716 09/26/24  0506 09/25/24  0412   SODIUM mmol/L 133* 145 150*   POTASSIUM mmol/L 3.3* 3.1* 3.1*   CHLORIDE mmol/L 98 103 104   CO2 mmol/L 24.1 30.3* 30.8*   BUN mg/dL 23 30* 39*   CREATININE mg/dL 1.16* 1.33* 1.23*   CALCIUM mg/dL 7.7* 8.0* 9.4   BILIRUBIN mg/dL 0.3 0.4 0.4   ALK PHOS U/L 115 155* 231*   ALT (SGPT) U/L 94* 150* 235*   AST (SGOT) U/L 179* 202* 339*   GLUCOSE mg/dL 158* 155* 215*     Results from " last 7 days   Lab Units 09/27/24  0716 09/26/24  0506 09/25/24  0412 09/24/24  1828   MAGNESIUM mg/dL  --   --  3.0* 2.2   PHOSPHORUS mg/dL  --   --  3.3 8.0*   HEMOGLOBIN g/dL 7.2*   < > 9.5* 9.6*   HEMATOCRIT % 23.0*   < > 29.5* 33.3*    < > = values in this interval not displayed.         Medications    Scheduled Medications chlorhexidine, 15 mL, Mouth/Throat, Q12H  enoxaparin, 30 mg, Subcutaneous, Q24H  fosphenytoin (Cerebyx) 100 mg PE in sodium chloride 0.9 % 50 mL IVPB, 100 mg PE, Intravenous, Q8H  levETIRAcetam, 500 mg, Intravenous, Q12H  pantoprazole, 40 mg, Intravenous, Q24H  piperacillin-tazobactam, 4.5 g, Intravenous, Q8H  senna-docusate sodium, 2 tablet, Nasogastric, BID  sodium chloride, 500 mL, Intravenous, Once  sodium chloride, 10 mL, Intravenous, Q12H  vancomycin (dosing per levels), , Does not apply, Daily  vancomycin, 20 mg/kg, Intravenous, Once        Infusions dexmedetomidine, 0.2 mcg/kg/hr, Last Rate: 0.6 mcg/kg/hr (09/27/24 0929)  dextrose 5 % with KCl 20 mEq, 100 mL/hr, Last Rate: 100 mL/hr (09/27/24 1120)  Pharmacy to Dose enoxaparin (LOVENOX),   Pharmacy to dose vancomycin,   Pharmacy to Dose Zosyn,         PRN Medications   acetaminophen    senna-docusate sodium **AND** polyethylene glycol **AND** [DISCONTINUED] bisacodyl **AND** bisacodyl    nitroglycerin    Pharmacy to Dose enoxaparin (LOVENOX)    Pharmacy to dose vancomycin    Pharmacy to Dose Zosyn    sodium chloride    sodium chloride     Physical Findings        Trending Physical   Appearance, NFPE    --  Edema  Edema noted    Bowel Function 9/25   Tubes CVC  Peripheral IV   NG-tube    Chewing/Swallowing NPO    Skin WNL    --  Current Nutrition Orders & Evaluation of Intake       Oral Nutrition     Food Allergies    Current PO Diet NPO Diet NPO Type: Tube Feeding   Supplement    PO Evaluation     Trending % PO Intake NPO      Nutrition Diagnosis         Nutrition Dx Problem 1   Needs alternate r/t NPO diet regimen as evidenced by  patient with NG-tube and need for enteral nutrition when medically appropriate            Nutrition Dx Problem 2        Intervention Goal         Intervention Goal(s) Hold TF until improved MAP score of >65mmHg   Maintain current body weight      Nutrition Intervention        RD Action Recommend holding TF until MAP score has improved     Nutrition Prescription          Diet Prescription NPO   Supplement Prescription    Enteral Nutrition Prescription     TPN Prescription       Monitor/Evaluation        Monitor Per protocol, Pertinent labs, EN delivery/tolerance, Weight, Skin status, GI status, Symptoms, POC/GOC, Hemodynamic stability     RD to follow-up.   Electronically signed by:  Chel Zaragoza RD  09/27/24 12:31 EDT

## 2024-09-27 NOTE — PROGRESS NOTES
RT EQUIPMENT DEVICE RELATED - SKIN ASSESSMENT    Blake Score:  Blake Score: 11     RT Medical Equipment/Device:     ETT Gates/Anchorfast    Skin Assessment:      Cheek:  Intact    Device Skin Pressure Protection:  Skin-to-device areas padded:  Anchorfast    Nurse Notification:  No    Lucretia Conway, CRT

## 2024-09-27 NOTE — PROGRESS NOTES
[facility]    Name:  Annabel Hyman   Age:  84 y.o.  Sex:  female  :  1940  MRN:  [id number]   Visit Number:  [id number]  Admission Date:  2024  Date Of Service:  24  Primary Care Physician:  Provider, No Known     LOS: 3 days :    Chief Complaint:      Follow-up of cardiac arrest and suspected anoxic encephalopathy.    Subjective:    Annabel Hyman was seen and examined this morning.  Unfortunately, since yesterday she has had high-grade fevers and this morning she started dropping her systolic blood pressures.  Despite IV fluids and a bolus, she continued to have low blood pressures and required initiation of Levophed.  She was also placed on vancomycin along with her Zosyn that has been on since admission.  Repeat CT of the head without contrast done today shows evidence of edema, more pronounced in the cerebellum than cerebrum compatible with anoxic brain injury.  Patient's family is scheduled to meet this afternoon with palliative care services to discuss goals of care.    Hospital Course:    Annabel Hyman is an 84-year-old female, chronically wheelchair-bound, history of CVA, hypertension, Alzheimer dementia was brought to the emergency room by EMS after cardiac arrest at home.  Patient apparently was sitting up and eating a cake while a strawberry got stuck in her throat.  She subsequently became unresponsive and turned blue.  Family started CPR and apparently took about 15 minutes for EMS arrival who continued the CPR with the Matthew device.  Family states that the patient did not turn cyanotic prior to EMS arrival.  Patient was transported to the ER with ongoing CPR and after she was in the ER and another dose of epinephrine, ROSC was obtained.  Patient was intubated and a left IJ central line was placed.  She was initiated on Levophed and was admitted to the ICU.    Initial vitals: Temperature 99.2, pulse 121, blood pressure  138/56 with subsequent drop to 83/49.  Pulse oxygen saturation 89% on arrival.  Initial ABG pH less than 6.7, pCO2 71, p.o. 213, bicarb 9.6 on 100% Ambu bag.  CMP showed a potassium of 5.3, BUN 28, creatinine 1.46 (unknown baseline), glucose 240, , .  Lactic acid was 14.  C-reactive protein 0.45.  INR 1.32.  WBC 12.3, hemoglobin 9.6.  Chest x-ray showed likely scarring or atelectasis of the left lung base.  Noncontrast CT of the head showed no acute intracranial process.  CT angiogram of the chest was negative for pulmonary embolism or aortic aneurysm.  Showed acute fractures of the left 5-7th anterior ribs and right anterior sixth and seventh ribs.  No pneumothorax.  T3 and T12 compression fractures of indeterminate age noted.  CT of the abdomen and pelvis with contrast showed very large amount of stool in the rectum with mild rectal wall thickening.  Stomach is distended with fluid.  Gallbladder not identified.  Intra and extrahepatic biliary dilatation without choledocholithiasis noted.  L4 and L5 compression fractures of indeterminate age.  Patient was given IV Zosyn and was placed on Precedex, Levophed and was subsequently admitted to the medical ICU.    Patient was seen by Dr. Castro from pulmonology for ventilatory management.  She was also seen by Dr. Kiran for tonic-clonic seizures while on mechanical ventilation.  He recommended Keppra and fosphenytoin.  EEG was ordered.  Patient's blood pressure improved and Levophed was discontinued.  Due to high suspicion of anoxic encephalopathy, palliative care services were consulted.    Patient was continued on Zosyn for aspiration pneumonia.  She continued to have fevers despite being on antibiotics therapy and started dropping her blood pressures.  She was initiated on vancomycin as well as Levophed.  Repeat CT scan of the head without contrast done on 9/27/2024 unfortunately showed evidence of edema, more pronounced in the cerebellum and cerebrum  compatible with anoxic brain injury.    Review of Systems:     All systems were reviewed and negative except as mentioned in subjective, assessment and plan.    Vital Signs:    Temp:  [98.2 °F (36.8 °C)-103.4 °F (39.7 °C)] 98.2 °F (36.8 °C)  Heart Rate:  [68-96] 73  Resp:  [20-22] 20  BP: ()/(40-59) 96/42  FiO2 (%):  [30 %] 30 %    Intake and output:    I/O last 3 completed shifts:  In: 6118.9 [I.V.:5525.9; NG/GT:293; IV Piggyback:300]  Out: 2645 [Urine:2145; Emesis/NG output:500]  I/O this shift:  In: 150 [NG/GT:150]  Out: 250 [Urine:250]    Physical Examination:    General Appearance:  Unresponsive on mechanical ventilation.     Head:  Atraumatic and normocephalic.   Eyes: Conjunctivae and sclerae normal, no icterus. No pallor.  Pupils bilaterally equal and reactive to light.   Throat: No oral lesions, no thrush, oral mucosa moist.  Endotracheal tube is in place.  NG tube is in place.   Neck: Supple, trachea midline, no thyromegaly.  Left internal jugular central venous line noted.   Lungs:   Breath sounds heard bilaterally equally.  No wheezing or crackles. No Pleural rub or bronchial breathing.   Heart:  Normal S1 and S2, no murmur, no gallop, no rub. No JVD.   Abdomen:   Normal bowel sounds, no masses, no organomegaly. Soft, nontender, nondistended, no rebound tenderness.  Butler catheter is in place.   Extremities: Supple, no edema, no cyanosis, no clubbing.  Poor muscle mass.   Skin: No bleeding or rash.   Neurologic: Unresponsive.  Currently on mechanical ventilation. No facial asymmetry.  Significant ankle clonus noted bilaterally.   Laboratory results:    Results from last 7 days   Lab Units 09/27/24  0716 09/26/24  0506 09/25/24  0412   SODIUM mmol/L 133* 145 150*   POTASSIUM mmol/L 3.3* 3.1* 3.1*   CHLORIDE mmol/L 98 103 104   CO2 mmol/L 24.1 30.3* 30.8*   BUN mg/dL 23 30* 39*   CREATININE mg/dL 1.16* 1.33* 1.23*   CALCIUM mg/dL 7.7* 8.0* 9.4   BILIRUBIN mg/dL 0.3 0.4 0.4   ALK PHOS U/L 115 155*  231*   ALT (SGPT) U/L 94* 150* 235*   AST (SGOT) U/L 179* 202* 339*   GLUCOSE mg/dL 158* 155* 215*     Results from last 7 days   Lab Units 09/27/24  0716 09/26/24  0506 09/25/24  0412   WBC 10*3/mm3 10.82* 15.26* 18.45*   HEMOGLOBIN g/dL 7.2* 7.8* 9.5*   HEMATOCRIT % 23.0* 24.7* 29.5*   PLATELETS 10*3/mm3 176 243 302     Results from last 7 days   Lab Units 09/24/24  1828   INR  1.32*     Results from last 7 days   Lab Units 09/24/24  2040 09/24/24  1852   BLOODCX   --  No growth at 2 days  No growth at 2 days   URINECX  >100,000 CFU/mL Klebsiella pneumoniae ssp pneumoniae*  --      Results from last 7 days   Lab Units 09/26/24  0724   PH, ARTERIAL pH units 7.516*   PO2 ART mm Hg 113.0*   PCO2, ARTERIAL mm Hg 39.3   HCO3 ART mmol/L 31.8*     I have reviewed the patient's laboratory results.    Radiology results:    CT Head Without Contrast    Result Date: 9/27/2024  PROCEDURE: CT HEAD WO CONTRAST-  HISTORY: anoxic brain injury; R09.2-Respiratory arrest; T17.308A-Unspecified foreign body in larynx causing other injury, initial encounter  COMPARISON: September 24, 2024.  TECHNIQUE: Multiple axial CT images were performed from the foramen magnum to the vertex without enhancement.  FINDINGS: There is decrease in size of sulci. There is mild dilatation of the ventricles which has decreased. Gray-white matter differentiation is still seen. Findings may represent mild edema with anoxic brain injury in the differential diagnosis. There is fairly diffuse decreased attenuation in the cerebellum compatible with edema. Decreased sulci are also seen in the cerebellum, more prominent than in cerebrum.      Impression: Evidence of edema, more pronounced in the cerebellum than cerebrum, compatible with anoxic brain injury.   DLP: 520.06 mGy.cm CTDI: 28.77 mGy   This study was performed with techniques to keep radiation doses as low as reasonably achievable (ALARA). Individualized dose reduction techniques using automated exposure  control or adjustment of mA and/or kV according to the patient size were employed.     Images were reviewed, interpreted, and dictated by Dr. Ana Pacheco MD Transcribed by Sarah Bruno PA-C.  This report was signed and finalized on 9/27/2024 1:42 PM by Ana Pacheco MD.      XR Chest 1 View    Result Date: 9/27/2024  PROCEDURE: XR CHEST 1 VW-  HISTORY: Resp Failure.; R09.2-Respiratory arrest; T17.308A-Unspecified foreign body in larynx causing other injury, initial encounter  COMPARISON: 1 day prior.  FINDINGS: The heart is normal in size. The lungs are clear. The mediastinum is unremarkable. There is no pneumothorax.  There are no acute osseous abnormalities. There is evidence of old calcified granulomatous disease. ET tube present with the tip 2.4 cm above the claude. NG tube is stable in position with the side port and tip in the fundus of the stomach. Left IJ catheter is in stable position.      Impression: Stable chest with stable position of support lines and catheters..      This report was signed and finalized on 9/27/2024 7:36 AM by Ana Pacheco MD.      EEG    Result Date: 9/26/2024  Reason for referral: 84 y.o.female with anoxia, altered mental status, episodes of rhythmic jerking, consideration of seizures Technical Summary:  A 16 channel digital EEG was performed using a cap for electrode placement.  A single EKG lead is present.  Duration:  20 minutes Findings: The patient is intubated.  Diffuse low amplitude theta and beta activity is present symmetrically over both hemispheres.  No lateralizing features are seen.  Photic stimulation does not change the background.  IV pump artifact is prominent disease study proceeds, electrode artifact is variably prominent at times over the midline parietal leads.  No focal features or epileptiform activity are seen.  No electrographic seizures are present. Video: None Technical quality: Fair SUMMARY: Moderate generalized suppression and slow No focal features  or epileptiform activity are seen     Impression: Diffuse cerebral dysfunction, at least moderate in degree No ongoing seizures are seen This report is transcribed using the Dragon dictation system.      XR Chest 1 View    Result Date: 9/26/2024   PROCEDURE: XR CHEST 1 VW-  HISTORY: Resp Failure.; R09.2-Respiratory arrest; T17.308A-Unspecified foreign body in larynx causing other injury, initial encounter  COMPARISON: 2 days prior.  FINDINGS: The heart is normal in size. The mediastinum is unremarkable. Decreased inspiratory effort noted with crowding of the lung markings in both lung bases. No area of consolidation is seen. There is mild interstitial disease similar to prior. Left internal jugular catheter and ET tube are in stable position. NG tube placed since the prior exam. Tip and sideport are located in the fundus of the stomach. Previously described gaseous distention of the stomach has resolved. There is no pneumothorax.  There are no acute osseous abnormalities.      Impression: Interval placement of NG tube with resolved gaseous distention of the stomach..  Stable ET tube and left internal jugular catheter from prior..  No acute infiltrate seen.    This report was signed and finalized on 9/26/2024 7:21 AM by Ana Pacheco MD.     I have reviewed the patient's radiology reports.    Medication Review:     I have reviewed the patient's active and prn medications.     Problem List:      Cardiac arrest    Acute respiratory failure with hypoxia and hypercapnia    Aspiration pneumonitis    Metabolic acidosis    ANDERS (acute kidney injury)    Ischemic hepatitis    Stercoral colitis    Assessment:    Cardiac arrest at home with prolonged CPR, POA.  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  Acute hypoxic and hypercapnic respiratory failure likely secondary to #2.  Aspiration pneumonitis, POA.  Acute renal failure, POA.  Septic shock, not POA.  Acute ischemic hepatitis (shock liver), POA.  Constipation with  stercoral colitis noted on CT scan.  Severe metabolic acidosis and lactic acidosis secondary to #1, POA.  Hypernatremia, POA.  History of stroke with functional quadriplegia.    Plan:    Cardiac arrest with prolonged CPR.  - Patient likely has suffered some amount of anoxic neurological injury and has had seizures and fevers which is not a good prognostic indicator.  - Repeat CT of the head done today shows evidence of anoxic brain injury.  - Exact etiology of cardiac arrest is uncertain but likely a respiratory event with aspiration pneumonitis, hypoxia leading to cardiac arrest.    Respiratory failure/aspiration pneumonia.  - Patient has been on Zosyn since admission.  - Continue ventilator management as per pulmonology.  - Despite being on Zosyn patient has had fevers and we will place her on vancomycin.  - Due to low blood pressures she will be initiated on Levophed today.    Anoxic encephalopathy.  - Patient was seen by Dr. Kiran from telemetry neurology and I have discussed the patient's treatment plan with him.  - Continue Keppra and fosphenytoin.  - Continue Precedex for sedation.  - EEG shows moderate degree of diffuse cerebral dysfunction without any seizures.    Severe metabolic acidosis/ANDERS.  - Continue dextrose with potassium infusion.  - Creatinine levels seem to be stable.  Patient may have underlying chronic kidney disease.    Nutrition.  - Unfortunately, patient is having significant residuals and we will hold tube feeds today.    Discussed with nursing staff at the bedside.    I had a long discussion with the patient's 2 daughters and grandson who are at the bedside.  I explained to them the repeat CT scan findings and likelihood of irreversible anoxic brain damage.  This seems to be agreeable with changing her CODE STATUS to DNR and discussed with palliative care about further goals of care.    I have reviewed the copied text and it is accurate as of 09/27/24    DVT Prophylaxis: Enoxaparin  Code  Status: Full-will likely change to DNR after discussion with palliative care services.  Diet: Tube feeds  Discharge Plan: Pending.    Sharath Monae MD  09/27/24  14:05 EDT    Dictated utilizing Dragon dictation.

## 2024-09-27 NOTE — PLAN OF CARE
Remains sedated & intubated. Pupils reactive. Not following commands this shift but withdraws from pain. Tmax 102.6, PRN Tylenol given x2 and cooling blanket applied per MD order. Temp 99.6 this morning.

## 2024-09-28 NOTE — PROGRESS NOTES
"  CC: Acute Respiratory Failure.  Status post cardiac arrest    S: Intubated and sedated.     ROS: Could not be obtained as the patient is on mechanical ventilator.    O:Vital signs reviewed.    /64   Pulse 77   Temp 99.9 °F (37.7 °C) (Rectal)   Resp 21   Ht 139.7 cm (55\")   Wt 48.5 kg (106 lb 14.8 oz)   SpO2 100%   BMI 24.85 kg/m²     Temp (24hrs), Av.1 °F (36.2 °C), Min:94.5 °F (34.7 °C), Max:99.9 °F (37.7 °C)      Vent Day #  4  FiO2: 35 %.   PEEP: 5  Peak Pressure: 16    CVP Line. Day # 4.   Butler catheter present.   NG/OG tube present.     I & Os reviewed.   Intake/Output         24 0700 - 24 0659 24 0700 - 24 0659    Intake (ml) 2245.9 2260    Output (ml)  --    Net (ml) 220.9 2260    Last Weight 48.5 kg (106 lb 14.8 oz) --            Net IO Since Admission: 7,141.83 mL [24 0931]    General/Constitutional: Intubated.  Sedated  Eyes: PERRL. Eyes were closed  Neck:  Supple with out obvious JVD. No obvious masses noted.   Cardiovascular: S1 + S2.  Regular  Lungs/Respiratory: Transmitted Breath sounds noted.  No wheezing heard.  No significant crackles noted  GI/Abdomen: Soft. Bowel sounds positive.  No obvious organomegaly noted.  Musculoskeletal/Extremities: No edema noted. Gait could not be assessed at this time, as the patient was laying in bed.   Neurologic: Sedated. Intubated.       Labs: Reviewed.   Results from last 7 days   Lab Units 24  0422 24  0716 24  0506 24  0412 24  1828   WBC 10*3/mm3 6.31 10.82* 15.26* 18.45* 12.31*   HEMOGLOBIN g/dL 6.8* 7.2* 7.8* 9.5* 9.6*   HEMATOCRIT % 21.5* 23.0* 24.7* 29.5* 33.3*   PLATELETS 10*3/mm3 148 176 243 302 250   NEUTROPHIL % %  --  79.9* 83.4*  --   --    NEUTROS ABS 10*3/mm3  --  8.65* 12.74*  --  4.19   EOSINOPHIL % %  --  0.5 0.2*  --   --    EOS ABS 10*3/mm3  --  0.05 0.03  --  0.62*   LYMPHOCYTE % %  --  14.5* 11.1*  --   --    LYMPHS ABS 10*3/mm3  --  1.57 1.69  --   --  " "      No results found for: \"PROCALCITO\"    Lab Results   Component Value Date    CRP 0.45 09/24/2024       No results found for: \"SEDRATE\"    No results found for: \"PROBNP\"    Results from last 7 days   Lab Units 09/28/24  0422 09/27/24  0716 09/26/24  0506   SODIUM mmol/L 130* 133* 145   POTASSIUM mmol/L 3.4* 3.3* 3.1*   CHLORIDE mmol/L 101 98 103   CO2 mmol/L 22.4 24.1 30.3*   BUN mg/dL 16 23 30*   CREATININE mg/dL 0.97 1.16* 1.33*   CALCIUM mg/dL 7.4* 7.7* 8.0*   ANION GAP mmol/L 6.6 10.9 11.7   BILIRUBIN mg/dL 0.2 0.3 0.4   ALK PHOS U/L 86 115 155*   ALT (SGPT) U/L 71* 94* 150*   AST (SGOT) U/L 132* 179* 202*   GLUCOSE mg/dL 190* 158* 155*   TOTAL PROTEIN g/dL 4.7* 5.4* 5.9*   ALBUMIN g/dL 2.4* 2.6* 3.0*       Results from last 7 days   Lab Units 09/25/24  0412 09/24/24  1828   MAGNESIUM mg/dL 3.0* 2.2   PHOSPHORUS mg/dL 3.3 8.0*       No results found for: \"TSH\"    No results found for: \"FREET4\"    Results from last 7 days   Lab Units 09/24/24  1828   INR  1.32*       No results found for: \"CKTOTAL\"    No components found for: \"HSTROPT\"    No results found for: \"TROPONINT\"    No results found for: \"DDIMER\"    No results found for: \"LIPASE\"    Brief Urine Lab Results  (Last result in the past 365 days)        Color   Clarity   Blood   Leuk Est   Nitrite   Protein   CREAT   Urine HCG        09/24/24 2040 Yellow   Cloudy   Moderate (2+)   Small (1+)   Negative   100 mg/dL (2+)                     Micro: As of September 28, 2024   No results found for: \"RESPCX\"  No results found for: \"BCIDPCR\"  Lab Results   Component Value Date    BLOODCX No growth at 3 days 09/24/2024    BLOODCX No growth at 3 days 09/24/2024     Lab Results   Component Value Date    URINECX (A) 09/24/2024     >100,000 CFU/mL Klebsiella pneumoniae ssp pneumoniae     No results found for: \"MRSACX\"  Lab Results   Component Value Date    MRSAPCR No MRSA Detected 09/27/2024     No results found for: \"URCX\"  No components found for: \"LOWRESPCF\"  No " "results found for: \"THROATCX\"  No results found for: \"CULTURES\"  No components found for: \"STREPBCX\"  No results found for: \"STREPPNEUAG\"  No results found for: \"LEGIONELLA\"  No results found for: \"LEGANTIGENUR\"  No results found for: \"MYCOPLASCX\"  No results found for: \"GCCX\"  No results found for: \"WOUNDCX\"  No results found for: \"BODYFLDCX\"    No results found for: \"FLU\"    No results found for: \"ADENOVIRUS\"  No results found for: \"QG615X\"  No results found for: \"CVHKU1\"  No results found for: \"CVNL63\"  No results found for: \"CVOC43\"  No results found for: \"HUMETPNEVS\"  No results found for: \"HURVEV\"  No results found for: \"FLUBPCR\"  No results found for: \"PARAINFLUE\"  No results found for: \"PARAFLUV2\"  No results found for: \"PARAFLUV3\"  No results found for: \"PARAFLUV4\"  No results found for: \"BPERTPCR\"  No results found for: \"RGFQE28262\"  No results found for: \"CPNEUPCR\"  No results found for: \"MPNEUMO\"  No results found for: \"FLUAPCR\"  No results found for: \"FLUAH3\"  No results found for: \"FLUAH1\"  No results found for: \"RSV\"  No results found for: \"BPARAPCR\"    COVID 19:  No results found for: \"COVID19\"        ABG:   Recent Labs     09/24/24  1949 09/26/24  0724 09/28/24  0746   PHART 7.362 7.516* 7.432   GYX9YTD 32.8* 39.3 36.8   PO2ART 452.0* 113.0* 109.0*   ZSI3DNV 18.6* 31.8* 24.5   BASEEXCESS -6.1* 8.2* 0.3       Lab Results   Component Value Date    LACTATE 3.1 (C) 09/25/2024    LACTATE 4.8 (C) 09/25/2024    LACTATE 8.9 (C) 09/24/2024         Echo:       dexmedetomidine, 0.2 mcg/kg/hr, Last Rate: 0.6 mcg/kg/hr (09/28/24 0220)  dextrose 5 % with KCl 20 mEq, 100 mL/hr, Last Rate: 100 mL/hr (09/28/24 0849)  norepinephrine, 0.02-0.3 mcg/kg/min, Last Rate: 0.02 mcg/kg/min (09/27/24 1512)  Pharmacy to Dose enoxaparin (LOVENOX),   Pharmacy to dose vancomycin,   Pharmacy to Dose Zosyn,           Imaging: Latest imaging study was reviewed personally.    Imaging Results (Last 24 Hours)       Procedure Component " Value Units Date/Time    CT Head Without Contrast [[id number]] Collected: 09/27/24 1315     Updated: 09/27/24 1344    Narrative:      PROCEDURE: CT HEAD WO CONTRAST-     HISTORY: anoxic brain injury; R09.2-Respiratory arrest;  T17.308A-Unspecified foreign body in larynx causing other injury,  initial encounter     COMPARISON: September 24, 2024.     TECHNIQUE: Multiple axial CT images were performed from the foramen  magnum to the vertex without enhancement.     FINDINGS: There is decrease in size of sulci. There is mild dilatation  of the ventricles which has decreased. Gray-white matter differentiation  is still seen. Findings may represent mild edema with anoxic brain  injury in the differential diagnosis. There is fairly diffuse decreased  attenuation in the cerebellum compatible with edema. Decreased sulci are  also seen in the cerebellum, more prominent than in cerebrum.       Impression:      Evidence of edema, more pronounced in the cerebellum than  cerebrum, compatible with anoxic brain injury.        DLP: 520.06 mGy.cm  CTDI: 28.77 mGy        This study was performed with techniques to keep radiation doses as low  as reasonably achievable (ALARA). Individualized dose reduction  techniques using automated exposure control or adjustment of mA and/or  kV according to the patient size were employed.              Images were reviewed, interpreted, and dictated by Dr. Ana Pacheco MD  Transcribed by Sarah Bruno PA-C.     This report was signed and finalized on 9/27/2024 1:42 PM by Ana Pacheco MD.                 Assessment & Recommendations/Plan:   1.  Acute Respiratory Failure  Not stable for extubation today, due to continued encephalopathy and likelihood of anoxic brain injury.  Chest x-ray and ABG as clinically indicated.    2.  Status post cardiac arrest  Unknown amount of time.     3.  Anoxic brain injury  Neurology following.  Latest CT of the head showed changes compatible with anoxic brain  "injury.    4.  Metabolic acidosis/shock liver  Metabolic acidosis seems to have improved  Liver enzymes are slowly decreasing  Will continue clinical follow-up    5.  Colitis/infection  On antibiotics per hospitalist service  Temp (24hrs), Av.1 °F (36.2 °C), Min:94.5 °F (34.7 °C), Max:99.9 °F (37.7 °C)  Temp (72hrs), Av.2 °F (37.3 °C), Min:94.5 °F (34.7 °C), Max:103.4 °F (39.7 °C)    No results found for: \"PROCALCITO\"  Lab Results   Component Value Date    WBC 6.31 2024    WBC 10.82 (H) 2024    WBC 15.26 (H) 2024     6.  Hematologic/anemia  Currently receiving PRBC.  Multifactorial.  We will continue to monitor closely  Lab Results   Component Value Date    HGB 6.8 (C) 2024    HGB 7.2 (L) 2024    HGB 7.8 (L) 2024     Lab Results   Component Value Date     2024     2024     2024     Lab Results   Component Value Date    INR 1.32 (H) 2024     7.  Renal/Fluid status/Electrolytes  Lab Results   Component Value Date     (L) 2024     (L) 2024     2024    K 3.4 (L) 2024    K 3.3 (L) 2024    K 3.1 (L) 2024     Lab Results   Component Value Date    BUN 16 2024    BUN 23 2024    BUN 30 (H) 2024    CREATININE 0.97 2024    CREATININE 1.16 (H) 2024    CREATININE 1.33 (H) 2024     Net IO Since Admission: 7,141.83 mL [24 0931]  No results found for: \"PROBNP\"    8.  GI/nutrition  Nutrition per Dietician.   GI prophylaxis per admitting attending.    9.  DVT prophylaxis  Per admitting attending.    10.  Miscellaneous  Had a long discussion with the patient daughters and granddaughters at the bedside.  They are aware of the poor prognosis.    The patient's family members want to discuss the possibility of terminal extubation amongst themselves.  I shared the findings on the latest CT of the head.    The patient remains at significant risk for organ " dysfunction and damage, requiring multiple measures to support and sustain organ function.  Condition remains critical.    Critical Care time spent in direct patient care: 35 minutes including high complexity decision making to assess, manipulate, and support vital organ system failure in this individual who has impairment of one or more vital organ systems such that there is a high probability of imminent or life threatening deterioration in the patient’s condition.  This time includes multiple reassessments throughout the day, if needed and as appropriate.  This time excludes other billable procedures. Time does include preparation of documents, review of old records, coordinating care with other providers and direct bedside care.    I have discussed patient's overall clinical status with Dr. Monae, especially with regards to likely anoxic brain injury and very poor overall prognosis.  I also discussed family's discussion amongst themselves with regards to possibility of terminal extubation.    Plan was also discussed with nursing staff, as necessary.     We have updated the admitting attending and nursing staff, as appropriate, on the patient's current status and plan. I will be going off shift tonight and will be unavailable. Please contact oncall pulmonologist, if available.      This document was electronically signed by Vincent Castro MD on 09/28/24 at 09:31 EDT      Dictated utilizing Dragon dictation.

## 2024-09-28 NOTE — PROGRESS NOTES
"Dietitian Follow-up    Patient Name: Annabel Hyman  YOB: 1940  MRN: [id number]  Admission date: 9/24/2024    Comment:    MAP score improved to 77mmHg - and TF resumed to 20mL/hr per intake chart. Patient's family met with palliative yesterday to discuss goals of care d/t recent CT results. Will continue to recommend patient remains on low dose tube feeds of 20mL/hr DO NOT ADVANCE. Provider reports TF stopped at one point yesterday due to high residuals. RD to continue to follow-up and monitor.     RD to follow-up.     Clinical Nutrition Follow-up   Encounter Information        Trending Narrative     9/28: MAP score improved to 77mmHg - and TF resumed to 20mL/hr per intake chart. Patient's family to meet with palliative today to discuss goals of care d/t recent CT results. Will continue to recommend patient remains on low dose tube feeds of 20mL/hr. Provider reports TF stopped at one point yesterday due to high residuals. RD to continue to follow-up and monitor.     9/27: Patient w/ 500mL NG-tube aspirate - will continue low dose TF. MAP score is 58mmHg - recommend holding TF regimen until MAP score is >65mmHg.      9/26: Patient remains NPO/intubated/sedated. RD consulted to provide enteral nutrition recommendations.      9/25: Patient with MST score of 2 - unsure of recent weight loss d/t chart review. Patient currently NPO w/ NG-tube - intubated and sedated. Propofol ordered 1.42-14.22mL/hr providing 38-375kcals/day. MAP is 121mmHg and lactate 3.1mmol/L. Will continue to follow-up and monitor.      Anthropometrics        Current Height, Weight Height: 139.7 cm (55\")  Weight: 48.5 kg (106 lb 14.8 oz) (09/28/24 0400)       Trending Weight Hx     This admission:              PTA:     Wt Readings from Last 30 Encounters:   09/28/24 0400 48.5 kg (106 lb 14.8 oz)   09/27/24 0400 47.6 kg (104 lb 15 oz)   09/26/24 0545 48.1 kg (106 lb 0.7 oz)   09/25/24 0600 47.4 kg (104 lb 8 oz)   09/24/24 2315 47.4 " kg (104 lb 8 oz)   09/24/24 1808 49 kg (108 lb)      BMI kg/m2 Body mass index is 24.85 kg/m².     Labs        Pertinent Labs Results from last 7 days   Lab Units 09/28/24  0422 09/27/24  0716 09/26/24  0506   SODIUM mmol/L 130* 133* 145   POTASSIUM mmol/L 3.4* 3.3* 3.1*   CHLORIDE mmol/L 101 98 103   CO2 mmol/L 22.4 24.1 30.3*   BUN mg/dL 16 23 30*   CREATININE mg/dL 0.97 1.16* 1.33*   CALCIUM mg/dL 7.4* 7.7* 8.0*   BILIRUBIN mg/dL 0.2 0.3 0.4   ALK PHOS U/L 86 115 155*   ALT (SGPT) U/L 71* 94* 150*   AST (SGOT) U/L 132* 179* 202*   GLUCOSE mg/dL 190* 158* 155*     Results from last 7 days   Lab Units 09/28/24  0422 09/26/24  0506 09/25/24  0412 09/24/24  1828   MAGNESIUM mg/dL  --   --  3.0* 2.2   PHOSPHORUS mg/dL  --   --  3.3 8.0*   HEMOGLOBIN g/dL 6.8*   < > 9.5* 9.6*   HEMATOCRIT % 21.5*   < > 29.5* 33.3*    < > = values in this interval not displayed.         Medications    Scheduled Medications chlorhexidine, 15 mL, Mouth/Throat, Q12H  enoxaparin, 30 mg, Subcutaneous, Q24H  fosphenytoin (Cerebyx) 100 mg PE in sodium chloride 0.9 % 50 mL IVPB, 100 mg PE, Intravenous, Q8H  levETIRAcetam, 500 mg, Intravenous, Q12H  pantoprazole, 40 mg, Intravenous, Q24H  piperacillin-tazobactam, 4.5 g, Intravenous, Q8H  senna-docusate sodium, 2 tablet, Nasogastric, BID  sodium chloride, 10 mL, Intravenous, Q12H  vancomycin (dosing per levels), , Does not apply, Daily        Infusions dexmedetomidine, 0.2 mcg/kg/hr, Last Rate: 0.6 mcg/kg/hr (09/28/24 0220)  dextrose 5 % with KCl 20 mEq, 100 mL/hr, Last Rate: 100 mL/hr (09/27/24 2232)  norepinephrine, 0.02-0.3 mcg/kg/min, Last Rate: 0.02 mcg/kg/min (09/27/24 1512)  Pharmacy to Dose enoxaparin (LOVENOX),   Pharmacy to dose vancomycin,   Pharmacy to Dose Zosyn,         PRN Medications   acetaminophen    senna-docusate sodium **AND** polyethylene glycol **AND** [DISCONTINUED] bisacodyl **AND** bisacodyl    nitroglycerin    Pharmacy to Dose enoxaparin (LOVENOX)    Pharmacy to dose  vancomycin    Pharmacy to Dose Zosyn    sodium chloride    sodium chloride     Physical Findings        Trending Physical   Appearance, NFPE    --  Edema  Edema noted    Bowel Function 9/25   Tubes CVC  Peripheral IV   NG-tube      Chewing/Swallowing NPO   Skin WNL    --  Current Nutrition Orders & Evaluation of Intake       Oral Nutrition     Food Allergies    Current PO Diet NPO Diet NPO Type: Tube Feeding   Supplement    PO Evaluation     Trending % PO Intake      Nutrition Diagnosis         Nutrition Dx Problem 1 Needs alternate r/t NPO diet regimen as evidenced by patient with NG-tube and need for enteral nutrition when medically appropriate       Nutrition Dx Problem 2        Intervention Goal         Intervention Goal(s) Continue TF regimen @ 20mL/hr  Maintain current body weight   MAP >65mmHg - in order to continue enteral nutrition regimen      Nutrition Intervention        RD Action No action at this time - will continue to monitor and follow-up     Nutrition Prescription          Diet Prescription    Supplement Prescription    Enteral Nutrition Prescription  Peptamen AF @ 20mL/hr - DO NOT ADVANCE  Free water flush of 120mL q4hrs    TPN Prescription       Monitor/Evaluation        Monitor Per protocol, Pertinent labs, EN delivery/tolerance, Weight, Skin status, GI status, Symptoms, Hemodynamic stability     RD to follow-up.   Electronically signed by:  Chel Zaragoza RD  09/28/24 08:16 EDT

## 2024-09-28 NOTE — PROGRESS NOTES
RT EQUIPMENT DEVICE RELATED - SKIN ASSESSMENT    Blake Score:  Blake Score: 11     RT Medical Equipment/Device:     ETT Gates/Anchorfast    Skin Assessment:      Mouth:  Intact    Device Skin Pressure Protection:  Pressure points protected    Nurse Notification:  No    Av Eisenberg, RRT

## 2024-09-28 NOTE — PLAN OF CARE
Problem: Inability to Wean (Mechanical Ventilation, Invasive)  Goal: Mechanical Ventilation Liberation  Outcome: Progressing   Goal Outcome Evaluation:      RT EQUIPMENT DEVICE RELATED - SKIN ASSESSMENT    RT Medical Equipment/Device:  ETT Gates/Anchorfast    Skin Assessment: Cheek: Intact    Device Skin Pressure Protection: Pressure points protected    Nurse Notification: Zhane Graff, RRT

## 2024-09-28 NOTE — PLAN OF CARE
Problem: Communication Impairment (Mechanical Ventilation, Invasive)  Goal: Effective Communication  Outcome: Progressing   Goal Outcome Evaluation:

## 2024-09-28 NOTE — PHARMACY RECOMMENDATION
"Pharmacy Consult - Vancomycin Dosing    Pharmacy was consulted to dose vancomycin for  Annabel Hyman, a 84 y.o. female  139.7 cm (55\") 48.5 kg (106 lb 14.8 oz)    Indication: Pneumonia  Consulting Provider: Dr. Monae    Goal AUC: 400-600 mg/L*hr.  Goal Trough: 15 - 20 mcg/ml    Labs  Results from last 7 days   Lab Units 09/28/24  0422 09/27/24  0716 09/26/24  0506   WBC 10*3/mm3 6.31 10.82* 15.26*   CREATININE mg/dL 0.97 1.16* 1.33*      Estimated Creatinine Clearance: 31.9 mL/min (by C-G formula based on SCr of 0.97 mg/dL).  Temp Readings from Last 1 Encounters:   09/28/24 99.9 °F (37.7 °C) (Rectal)       Results from last 7 days   Lab Units 09/28/24  0422   VANCOMYCIN RM mcg/mL 7.20               Other Antimicrobials    Zosyn 4.5g IV every 8 hours     InsightRX AUC Calculation    Current dose: dosing by levels    New dose: 1000 mg IV every 24 hours   Predicted Steady State AUC on New Dose: 553 mg/L*hr    Assessment/Plan    Patient currently receiving vancomycin dosed by levels. Random vancomycin level this AM resulted at 7.2 mcg/ml.  Will adjust vancomycin dose to 1000 mg IV every 24 hours to attain a target AUC within goal range.  Assess clearance by vancomycin level on 9/29 @ 0600.  Pharmacy will continue to monitor renal function, cultures and sensitivities, and clinical status to adjust regimen as necessary.      Thank you,  Zhanna Landa, PharmD, BCPS  09/28/24 10:29 EDT    "

## 2024-09-28 NOTE — PROGRESS NOTES
[facility]    Name:  Annabel Hyman   Age:  84 y.o.  Sex:  female  :  1940  MRN:  [id number]   Visit Number:  [id number]  Admission Date:  2024  Date Of Service:  24  Primary Care Physician:  Provider, No Known     LOS: 4 days :    Chief Complaint:      Follow-up of cardiac arrest and suspected anoxic encephalopathy.    Subjective:    Annabel Hyman was seen and examined this morning.  Patient is currently on mechanical ventilation.  No change in her mental status.  She is still unresponsive.  She did drop her hemoglobin this morning and has been transfused with 1 unit of packed red blood cells.  Levophed has been turned off this morning.  Patient was seen by Dr. Castro who recommended terminal weaning due to her severe anoxic encephalopathy.  After discussion with palliative care services and family members, patient's CODE STATUS was changed to DNR yesterday.    Hospital Course:    Annabel Hyman is an 84-year-old female, chronically wheelchair-bound, history of CVA, hypertension, Alzheimer dementia was brought to the emergency room by EMS after cardiac arrest at home.  Patient apparently was sitting up and eating a cake while a strawberry got stuck in her throat.  She subsequently became unresponsive and turned blue.  Family started CPR and apparently took about 15 minutes for EMS arrival who continued the CPR with the Matthew device.  Family states that the patient did not turn cyanotic prior to EMS arrival.  Patient was transported to the ER with ongoing CPR and after she was in the ER and another dose of epinephrine, ROSC was obtained.  Patient was intubated and a left IJ central line was placed.  She was initiated on Levophed and was admitted to the ICU.    Initial vitals: Temperature 99.2, pulse 121, blood pressure 138/56 with subsequent drop to 83/49.  Pulse oxygen saturation 89% on arrival.  Initial ABG pH less than 6.7, pCO2 71,  p.o. 213, bicarb 9.6 on 100% Ambu bag.  CMP showed a potassium of 5.3, BUN 28, creatinine 1.46 (unknown baseline), glucose 240, , .  Lactic acid was 14.  C-reactive protein 0.45.  INR 1.32.  WBC 12.3, hemoglobin 9.6.  Chest x-ray showed likely scarring or atelectasis of the left lung base.  Noncontrast CT of the head showed no acute intracranial process.  CT angiogram of the chest was negative for pulmonary embolism or aortic aneurysm.  Showed acute fractures of the left 5-7th anterior ribs and right anterior sixth and seventh ribs.  No pneumothorax.  T3 and T12 compression fractures of indeterminate age noted.  CT of the abdomen and pelvis with contrast showed very large amount of stool in the rectum with mild rectal wall thickening.  Stomach is distended with fluid.  Gallbladder not identified.  Intra and extrahepatic biliary dilatation without choledocholithiasis noted.  L4 and L5 compression fractures of indeterminate age.  Patient was given IV Zosyn and was placed on Precedex, Levophed and was subsequently admitted to the medical ICU.    Patient was seen by Dr. Castro from pulmonology for ventilatory management.  She was also seen by Dr. Kiran for tonic-clonic seizures while on mechanical ventilation.  He recommended Keppra and fosphenytoin.  EEG was ordered.  Patient's blood pressure improved and Levophed was discontinued.  Due to high suspicion of anoxic encephalopathy, palliative care services were consulted.    Patient was continued on Zosyn for aspiration pneumonia.  She continued to have fevers despite being on antibiotics therapy and started dropping her blood pressures.  She was initiated on vancomycin as well as Levophed.  Repeat CT scan of the head without contrast done on 9/27/2024 unfortunately showed evidence of edema, more pronounced in the cerebellum and cerebrum compatible with anoxic brain injury.  After discussion with family and palliative care services, patient's CODE STATUS  was changed to DNR.    Review of Systems:     All systems were reviewed and negative except as mentioned in subjective, assessment and plan.    Vital Signs:    Temp:  [94.5 °F (34.7 °C)-99.9 °F (37.7 °C)] 99.9 °F (37.7 °C)  Heart Rate:  [55-90] 77  Resp:  [21-23] 21  BP: ()/(42-79) 138/65  FiO2 (%):  [30 %] 30 %    Intake and output:    I/O last 3 completed shifts:  In: 6193.9 [I.V.:5632.9; NG/GT:561]  Out: 2705 [Urine:2705]  I/O this shift:  In: 2260 [I.V.:1631; Other:400; NG/GT:229]  Out: -     Physical Examination:    General Appearance:  Unresponsive on mechanical ventilation.     Head:  Atraumatic and normocephalic.   Eyes: Conjunctivae and sclerae normal, no icterus.  Looks pale.  Pupils bilaterally equal and reactive to light.   Throat: No oral lesions, no thrush, oral mucosa moist.  Endotracheal tube is in place.  NG tube is in place.   Neck: Supple, trachea midline, no thyromegaly.  Left internal jugular central venous line noted.   Lungs:   Breath sounds heard bilaterally equally.  No wheezing or crackles. No Pleural rub or bronchial breathing.   Heart:  Normal S1 and S2, no murmur, no gallop, no rub. No JVD.   Abdomen:   Normal bowel sounds, no masses, no organomegaly. Soft, nontender, nondistended, no rebound tenderness.  Butler catheter is in place.   Extremities: Supple, no edema, no cyanosis, no clubbing.  Poor muscle mass.   Skin: No bleeding or rash.   Neurologic: Unresponsive.  Currently on mechanical ventilation. No facial asymmetry.  Significant ankle clonus noted bilaterally.   Laboratory results:    Results from last 7 days   Lab Units 09/28/24  0422 09/27/24  0716 09/26/24  0506   SODIUM mmol/L 130* 133* 145   POTASSIUM mmol/L 3.4* 3.3* 3.1*   CHLORIDE mmol/L 101 98 103   CO2 mmol/L 22.4 24.1 30.3*   BUN mg/dL 16 23 30*   CREATININE mg/dL 0.97 1.16* 1.33*   CALCIUM mg/dL 7.4* 7.7* 8.0*   BILIRUBIN mg/dL 0.2 0.3 0.4   ALK PHOS U/L 86 115 155*   ALT (SGPT) U/L 71* 94* 150*   AST (SGOT) U/L  132* 179* 202*   GLUCOSE mg/dL 190* 158* 155*     Results from last 7 days   Lab Units 09/28/24  0422 09/27/24  0716 09/26/24  0506   WBC 10*3/mm3 6.31 10.82* 15.26*   HEMOGLOBIN g/dL 6.8* 7.2* 7.8*   HEMATOCRIT % 21.5* 23.0* 24.7*   PLATELETS 10*3/mm3 148 176 243     Results from last 7 days   Lab Units 09/24/24  1828   INR  1.32*     Results from last 7 days   Lab Units 09/24/24  2040 09/24/24  1852   BLOODCX   --  No growth at 3 days  No growth at 3 days   URINECX  >100,000 CFU/mL Klebsiella pneumoniae ssp pneumoniae*  --      Results from last 7 days   Lab Units 09/28/24  0746   PH, ARTERIAL pH units 7.432   PO2 ART mm Hg 109.0*   PCO2, ARTERIAL mm Hg 36.8   HCO3 ART mmol/L 24.5     I have reviewed the patient's laboratory results.    Radiology results:    CT Head Without Contrast    Result Date: 9/27/2024  PROCEDURE: CT HEAD WO CONTRAST-  HISTORY: anoxic brain injury; R09.2-Respiratory arrest; T17.308A-Unspecified foreign body in larynx causing other injury, initial encounter  COMPARISON: September 24, 2024.  TECHNIQUE: Multiple axial CT images were performed from the foramen magnum to the vertex without enhancement.  FINDINGS: There is decrease in size of sulci. There is mild dilatation of the ventricles which has decreased. Gray-white matter differentiation is still seen. Findings may represent mild edema with anoxic brain injury in the differential diagnosis. There is fairly diffuse decreased attenuation in the cerebellum compatible with edema. Decreased sulci are also seen in the cerebellum, more prominent than in cerebrum.      Impression: Evidence of edema, more pronounced in the cerebellum than cerebrum, compatible with anoxic brain injury.   DLP: 520.06 mGy.cm CTDI: 28.77 mGy   This study was performed with techniques to keep radiation doses as low as reasonably achievable (ALARA). Individualized dose reduction techniques using automated exposure control or adjustment of mA and/or kV according to the  patient size were employed.     Images were reviewed, interpreted, and dictated by Dr. Ana Pacheco MD Transcribed by Sarah Bruno PA-C.  This report was signed and finalized on 9/27/2024 1:42 PM by Ana Pacheco MD.      XR Chest 1 View    Result Date: 9/27/2024  PROCEDURE: XR CHEST 1 VW-  HISTORY: Resp Failure.; R09.2-Respiratory arrest; T17.308A-Unspecified foreign body in larynx causing other injury, initial encounter  COMPARISON: 1 day prior.  FINDINGS: The heart is normal in size. The lungs are clear. The mediastinum is unremarkable. There is no pneumothorax.  There are no acute osseous abnormalities. There is evidence of old calcified granulomatous disease. ET tube present with the tip 2.4 cm above the claude. NG tube is stable in position with the side port and tip in the fundus of the stomach. Left IJ catheter is in stable position.      Impression: Stable chest with stable position of support lines and catheters..      This report was signed and finalized on 9/27/2024 7:36 AM by Ana Pacheco MD.     I have reviewed the patient's radiology reports.    Medication Review:     I have reviewed the patient's active and prn medications.     Problem List:      Cardiac arrest    Acute respiratory failure with hypoxia and hypercapnia    Aspiration pneumonitis    Metabolic acidosis    ANDERS (acute kidney injury)    Ischemic hepatitis    Stercoral colitis    Assessment:    Cardiac arrest at home with prolonged CPR, POA.  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  Acute hypoxic and hypercapnic respiratory failure likely secondary to #2.  Aspiration pneumonitis, POA.  Acute renal failure, POA.  Septic shock, not POA.  Acute ischemic hepatitis (shock liver), POA.  Constipation with stercoral colitis noted on CT scan.  Severe metabolic acidosis and lactic acidosis secondary to #1, POA.  Hypernatremia, POA.  History of stroke with functional quadriplegia.    Plan:    Cardiac arrest with prolonged CPR.  - Patient  likely has suffered some amount of anoxic neurological injury and has had seizures and fevers which is not a good prognostic indicator.  - Repeat CT of the head done today shows evidence of anoxic brain injury.  There is significant swelling in the cerebellum and she is at high risk for herniation.  - Exact etiology of cardiac arrest is uncertain but likely a respiratory event with aspiration pneumonitis, hypoxia leading to cardiac arrest.    Respiratory failure/aspiration pneumonia.  - Patient has been on Zosyn since admission and was added on vancomycin due to ongoing fevers.  - Continue ventilator management as per pulmonology.  She was seen by Dr. Castro this morning and I have discussed the patient's treatment plan with him.  - Due to low blood pressures she was on Levophed for about 24 hours.    Acute on chronic anemia.  - Patient did drop her hemoglobin and we will transfuse her with 1 unit of packed red blood cells.  - Continue IV Protonix daily.  - We will discontinue enoxaparin    Anoxic encephalopathy.  - Patient does have severe anoxic encephalopathy with edema of the cerebellum and high risk for herniation.  - Continue Keppra and fosphenytoin for seizures.  - Continue Precedex for sedation.  - EEG shows moderate degree of diffuse cerebral dysfunction without any seizures.  - Patient's prognosis is extremely poor and I have recommended terminal weaning.  Family is currently discussing about further goals of care but they do not want her to suffer more and want her to be DNR at this time.    Nutrition.  - Continue tube feeds for nutrition.    Discussed with nursing staff at the bedside.  I have discussed the patient's condition and treatment plan with multiple family members were at the bedside.    I have reviewed the copied text and it is accurate as of 09/28/24    DVT Prophylaxis: SCDs.  Code Status: DNR.  Diet: Tube feeds  Discharge Plan: Pending.    Sharath Monae MD  09/28/24  13:10 EDT    Dictated  utilizing Dragon dictation.

## 2024-09-29 NOTE — PROGRESS NOTES
RT EQUIPMENT DEVICE RELATED - SKIN ASSESSMENT    Blake Score:  Blake Score: 11     RT Medical Equipment/Device:     ETT Gates/Anchorfast    Skin Assessment:      Lips:  Intact    Device Skin Pressure Protection:  Positioning supports utilized and      Nurse Notification:  No    Fernando Ackerman, RRT

## 2024-09-29 NOTE — PROGRESS NOTES
[facility]    [facility]    Name:  Annabel Hyman   Age:  84 y.o.  Sex:  female  :  1940  MRN:  [id number]   Visit Number:  [id number]  Admission Date:  2024  Date Of Service:  24  Primary Care Physician:  Provider, No Known     LOS: 5 days :    Chief Complaint:      Follow-up of cardiac arrest and suspected anoxic encephalopathy.    Subjective:    Annabel Hyman was seen and examined this morning.  No change in the patient's clinical condition since the last couple of days.  In fact, patient has been completely unresponsive and now has bilateral pinpoint pupils likely from brain herniation.  She is currently off Levophed.  She is currently on tube feeds and IV fluids.  Family at the bedside now considering to make her comfort care and they have recommended not to continue blood draws and certain medications.  They are agreeable to keep her on mechanical ventilation and continue tube feeds at this time.    Hospital Course:    Annabel Hyman is an 84-year-old female, chronically wheelchair-bound, history of CVA, hypertension, Alzheimer dementia was brought to the emergency room by EMS after cardiac arrest at home.  Patient apparently was sitting up and eating a cake while a strawberry got stuck in her throat.  She subsequently became unresponsive and turned blue.  Family started CPR and apparently took about 15 minutes for EMS arrival who continued the CPR with the Matthew device.  Family states that the patient did not turn cyanotic prior to EMS arrival.  Patient was transported to the ER with ongoing CPR and after she was in the ER and another dose of epinephrine, ROSC was obtained.  Patient was intubated and a left IJ central line was placed.  She was initiated on Levophed and was admitted to the ICU.    Initial vitals: Temperature 99.2, pulse 121, blood pressure 138/56 with subsequent drop to 83/49.  Pulse oxygen saturation 89% on arrival.  Initial  ABG pH less than 6.7, pCO2 71, p.o. 213, bicarb 9.6 on 100% Ambu bag.  CMP showed a potassium of 5.3, BUN 28, creatinine 1.46 (unknown baseline), glucose 240, , .  Lactic acid was 14.  C-reactive protein 0.45.  INR 1.32.  WBC 12.3, hemoglobin 9.6.  Chest x-ray showed likely scarring or atelectasis of the left lung base.  Noncontrast CT of the head showed no acute intracranial process.  CT angiogram of the chest was negative for pulmonary embolism or aortic aneurysm.  Showed acute fractures of the left 5-7th anterior ribs and right anterior sixth and seventh ribs.  No pneumothorax.  T3 and T12 compression fractures of indeterminate age noted.  CT of the abdomen and pelvis with contrast showed very large amount of stool in the rectum with mild rectal wall thickening.  Stomach is distended with fluid.  Gallbladder not identified.  Intra and extrahepatic biliary dilatation without choledocholithiasis noted.  L4 and L5 compression fractures of indeterminate age.  Patient was given IV Zosyn and was placed on Precedex, Levophed and was subsequently admitted to the medical ICU.    Patient was seen by Dr. Castro from pulmonology for ventilatory management.  She was also seen by Dr. Kiran for tonic-clonic seizures while on mechanical ventilation.  He recommended Keppra and fosphenytoin.  EEG was ordered.  Patient's blood pressure improved and Levophed was discontinued.  Due to high suspicion of anoxic encephalopathy, palliative care services were consulted.    Patient was continued on Zosyn for aspiration pneumonia.  She continued to have fevers despite being on antibiotics therapy and started dropping her blood pressures.  She was initiated on vancomycin as well as Levophed.  Repeat CT scan of the head without contrast done on 9/27/2024 unfortunately showed evidence of edema, more pronounced in the cerebellum and cerebrum compatible with anoxic brain injury.  After discussion with family and palliative care  services, patient's CODE STATUS was changed to DNR.  Patient continued to be unresponsive and eventually developed pinpoint pupils that were not reactive likely from posterior brain edema and herniation.    Review of Systems:     All systems were reviewed and negative except as mentioned in subjective, assessment and plan.    Vital Signs:    Temp:  [96.1 °F (35.6 °C)-100 °F (37.8 °C)] 96.8 °F (36 °C)  Heart Rate:  [58-88] 73  Resp:  [20-22] 22  BP: ()/() 141/61  FiO2 (%):  [30 %] 30 %    Intake and output:    I/O last 3 completed shifts:  In: 7176 [I.V.:5236; Blood:300; Other:955; NG/GT:685]  Out: 3700 [Urine:3700]  No intake/output data recorded.    Physical Examination:    General Appearance:  Unresponsive on mechanical ventilation.     Head:  Atraumatic and normocephalic.   Eyes: Conjunctivae and sclerae normal, no icterus.  Looks pale.  Pupils bilaterally pinpoint and nonreactive.   Throat: No oral lesions, no thrush, oral mucosa moist.  Endotracheal tube is in place.  NG tube is in place.   Neck: Supple, trachea midline, no thyromegaly.  Left internal jugular central venous line noted.   Lungs:   Breath sounds heard bilaterally equally.  No wheezing or crackles. No Pleural rub or bronchial breathing.   Heart:  Normal S1 and S2, no murmur, no gallop, no rub. No JVD.   Abdomen:   Normal bowel sounds, no masses, no organomegaly. Soft, nontender, nondistended, no rebound tenderness.  Butler catheter is in place.   Extremities: Supple, no edema, no cyanosis, no clubbing.  Poor muscle mass.   Skin: No bleeding or rash.   Neurologic: Unresponsive.  Currently on mechanical ventilation.    Laboratory results:    Results from last 7 days   Lab Units 09/29/24  0401 09/28/24  0422 09/27/24  0716 09/26/24  0506   SODIUM mmol/L 129* 130* 133* 145   POTASSIUM mmol/L 3.5 3.4* 3.3* 3.1*   CHLORIDE mmol/L 98 101 98 103   CO2 mmol/L 20.5* 22.4 24.1 30.3*   BUN mg/dL 12 16 23 30*   CREATININE mg/dL 0.80 0.97 1.16* 1.33*    CALCIUM mg/dL 7.3* 7.4* 7.7* 8.0*   BILIRUBIN mg/dL  --  0.2 0.3 0.4   ALK PHOS U/L  --  86 115 155*   ALT (SGPT) U/L  --  71* 94* 150*   AST (SGOT) U/L  --  132* 179* 202*   GLUCOSE mg/dL 182* 190* 158* 155*     Results from last 7 days   Lab Units 09/29/24  0401 09/28/24  0422 09/27/24  0716   WBC 10*3/mm3 6.28 6.31 10.82*   HEMOGLOBIN g/dL 8.7* 6.8* 7.2*   HEMATOCRIT % 26.9* 21.5* 23.0*   PLATELETS 10*3/mm3 169 148 176     Results from last 7 days   Lab Units 09/24/24  1828   INR  1.32*     Results from last 7 days   Lab Units 09/24/24  2040 09/24/24  1852   BLOODCX   --  No growth at 4 days  No growth at 4 days   URINECX  >100,000 CFU/mL Klebsiella pneumoniae ssp pneumoniae*  --      Results from last 7 days   Lab Units 09/28/24  0746   PH, ARTERIAL pH units 7.432   PO2 ART mm Hg 109.0*   PCO2, ARTERIAL mm Hg 36.8   HCO3 ART mmol/L 24.5     I have reviewed the patient's laboratory results.    Radiology results:    CT Head Without Contrast    Result Date: 9/29/2024  PROCEDURE: CT HEAD WO CONTRAST-  HISTORY: To look for cerebral edema from anoxic brain injury; R09.2-Respiratory arrest; T17.308A-Unspecified foreign body in larynx causing other injury, initial encounter  COMPARISON: 2 days prior.  TECHNIQUE: Multiple axial CT images were performed from the foramen magnum to the vertex. Individualized dose reduction techniques using automated exposure control or adjustment of mA and/or kV according to the patient size were employed.  FINDINGS: There is mild, age-appropriate, stable generalized cerebral atrophy. The ventricles are enlarged. There is decreased attenuation in a significant portion of the cerebellum with loss of some sulci suggesting edema, similar to the prior exam. There is decreased attenuation in the caudate nuclei bilaterally, particularly compared to 09/24/2024 as well as decreased attenuation in the external capsules bilaterally. Lateral ventricles are mildly decreased in size, particularly  compared to 09/24/2024. There is some loss of cerebral sulci compared to 09/27/2024. No masses are identified. No extra-axial fluid is seen. The paranasal sinuses are unremarkable.      Impression: Evidence of cerebral edema involving the cerebrum and the cerebellum as described slightly worsened compared to 09/27/2024.     CTDI: 32.17 mGy DLP:504.09 mGy.cm  This report was signed and finalized on 9/29/2024 10:25 AM by Ana Pacheco MD.      CT Head Without Contrast    Result Date: 9/27/2024  PROCEDURE: CT HEAD WO CONTRAST-  HISTORY: anoxic brain injury; R09.2-Respiratory arrest; T17.308A-Unspecified foreign body in larynx causing other injury, initial encounter  COMPARISON: September 24, 2024.  TECHNIQUE: Multiple axial CT images were performed from the foramen magnum to the vertex without enhancement.  FINDINGS: There is decrease in size of sulci. There is mild dilatation of the ventricles which has decreased. Gray-white matter differentiation is still seen. Findings may represent mild edema with anoxic brain injury in the differential diagnosis. There is fairly diffuse decreased attenuation in the cerebellum compatible with edema. Decreased sulci are also seen in the cerebellum, more prominent than in cerebrum.      Impression: Evidence of edema, more pronounced in the cerebellum than cerebrum, compatible with anoxic brain injury.   DLP: 520.06 mGy.cm CTDI: 28.77 mGy   This study was performed with techniques to keep radiation doses as low as reasonably achievable (ALARA). Individualized dose reduction techniques using automated exposure control or adjustment of mA and/or kV according to the patient size were employed.     Images were reviewed, interpreted, and dictated by Dr. Ana Pacheco MD Transcribed by Sarah Bruno PA-C.  This report was signed and finalized on 9/27/2024 1:42 PM by Ana Pacheco MD.     I have reviewed the patient's radiology reports.    Medication Review:     I have reviewed the patient's  active and prn medications.     Problem List:      Cardiac arrest    Acute respiratory failure with hypoxia and hypercapnia    Aspiration pneumonitis    Metabolic acidosis    ANDERS (acute kidney injury)    Ischemic hepatitis    Stercoral colitis    Assessment:    Cardiac arrest at home with prolonged CPR, POA.  Severe anoxic encephalopathy with seizures, POA.  Acute hypoxic and hypercapnic respiratory failure likely secondary to #2.  Aspiration pneumonitis, POA.  Acute renal failure, POA.  Septic shock, not POA.  Acute ischemic hepatitis (shock liver), POA.  Constipation with stercoral colitis noted on CT scan.  Severe metabolic acidosis and lactic acidosis secondary to #1, POA.  Hypernatremia, POA.  History of stroke with functional quadriplegia.    Plan:    Anoxic encephalopathy.  - Patient does have severe anoxic encephalopathy with edema of the cerebellum and high risk for herniation.  - Patient is currently having pinpoint pupils likely from worsening brain edema and possible herniation.  - Continue Keppra and fosphenytoin for seizures.  - EEG showed moderate degree of diffuse cerebral dysfunction without any seizures a few days ago.  - Patient's prognosis is extremely poor and I have recommended terminal weaning.  Family is currently discussing about further goals of care but want her to be comfort care at this time with only ventilation and nutrition.  - Repeat CT of the head done today shows worsening of brain edema.    Cardiac arrest with prolonged CPR.  - Patient likely has suffered some amount of anoxic neurological injury and has had seizures and fevers which is not a good prognostic indicator.  - Repeat CT of the head done today shows evidence of anoxic brain injury.  There is significant swelling in the cerebellum and she is at high risk for herniation.  - Exact etiology of cardiac arrest is uncertain but likely a respiratory event with aspiration pneumonitis, hypoxia leading to cardiac  arrest.    Respiratory failure/aspiration pneumonia.  - Patient has been on Zosyn and vancomycin with improvement in  fevers but family currently wants her to be comfort measures with no further aggressive therapy.  - Continue mechanical ventilation for now.  She is currently off Levophed.  - We will discontinue Precedex.  - Continue morphine and Ativan for comfort measures.    Acute on chronic anemia.  - Status post 1 unit of blood transfusion.  - Continue IV Protonix daily.    Nutrition.  - Continue tube feeds for nutrition.  - Discontinue IV fluids.    I have discussed the patient's condition and treatment plan with her daughters who are at the bedside.  They agree with DNR and want her to be comfort measures.    Discussed with nursing staff at the bedside.    I have reviewed the copied text and it is accurate as of 09/29/24    DVT Prophylaxis: SCDs.  Code Status: Comfort measures  Diet: Tube feeds  Discharge Plan: Pending.    Sharath Monae MD  09/29/24  10:44 EDT    Dictated utilizing Dragon dictation.

## 2024-09-29 NOTE — PROGRESS NOTES
"DOS: 2024  NAME: Annabel Hyman   : 1940  PCP: Provider, No Known  Chief Complaint   Patient presents with    Cardiac Arrest       Chief complaint: She is intubated, comatose on the ventilator.  Subjective: Family members are leaning towards palliative care at this point.  They have decided no further blood draws or poking of the patient but noninvasive tests are allowed including CT of the brain.    Objective:  Vital signs: BP 98/50   Pulse 61   Temp 96.4 °F (35.8 °C)   Resp 22   Ht 139.7 cm (55\")   Wt 48.9 kg (107 lb 12.9 oz)   SpO2 98%   BMI 25.06 kg/m²    Gen: NAD, vitals reviewed  MS: No verbal response and no response to noxious stimulation but she does tend to grimace a little bit upon deep sternal rub.  CN: Cranials 2-12: The pupils are very much pinpoint and sluggishly reacting to light.  Doll's eye movements could not be elicited.  Grimaces slightly to noxious stimulation symmetrically.  The gag response is preserved.  On pinching the sides of the neck she tends to move the head to the left side.  Motor: Try to withdraw to painful stimuli on the right upper extremity and both lower extremities not much on the left.  She had prior stroke on that side.  Sensory: Appreciates noxious stimulation in both lower extremities and in the right upper extremity.  Coordination: Could not be tested at this time.  Gait: Not weightbearing.    ROS:  General: Pleasant lady currently intubated on the ventilator without any further jerking episodes.  Neurological: Patient with most probable anoxic brain injury with swelling of the cerebellar area.    Laboratory results:  Lab Results   Component Value Date    GLUCOSE 182 (H) 2024    CALCIUM 7.3 (L) 2024     (L) 2024    K 3.5 2024    CO2 20.5 (L) 2024    CL 98 2024    BUN 12 2024    CREATININE 0.80 2024    BCR 15.0 2024    ANIONGAP 10.5 2024     Lab Results   Component Value Date    WBC " 6.28 09/29/2024    HGB 8.7 (L) 09/29/2024    HCT 26.9 (L) 09/29/2024    MCV 81.5 09/29/2024     09/29/2024              Review of labs: The hemoglobin is low at 8.7 and hematocrit is low at 26.9 and the platelet count is normal at 169,000.  The EGFR is normal at 72.     CMP:        Lab 09/29/24  0401 09/28/24  0422 09/27/24  0716 09/26/24  0506 09/25/24  0412 09/24/24  1828   SODIUM 129* 130* 133* 145 150* 143   POTASSIUM 3.5 3.4* 3.3* 3.1* 3.1* 5.3*   CHLORIDE 98 101 98 103 104 104   CO2 20.5* 22.4 24.1 30.3* 30.8* 13.8*   ANION GAP 10.5 6.6 10.9 11.7 15.2* 25.2*   BUN 12 16 23 30* 39* 28*   CREATININE 0.80 0.97 1.16* 1.33* 1.23* 1.46*   EGFR 71.9 57.0* 46.0* 39.0* 42.9* 34.9*   GLUCOSE 182* 190* 158* 155* 215* 240*   CALCIUM 7.3* 7.4* 7.7* 8.0* 9.4 9.6   MAGNESIUM 1.7  --   --   --  3.0* 2.2   PHOSPHORUS 1.7*  --   --   --  3.3 8.0*   TOTAL PROTEIN  --  4.7* 5.4* 5.9* 6.7 7.0   ALBUMIN  --  2.4* 2.6* 3.0* 3.6 3.6   GLOBULIN  --  2.3 2.8 2.9 3.1 3.4   ALT (SGPT)  --  71* 94* 150* 235* 152*   AST (SGOT)  --  132* 179* 202* 339* 197*   BILIRUBIN  --  0.2 0.3 0.4 0.4 0.3   BILIRUBIN DIRECT  --   --   --  <0.2  --   --    ALK PHOS  --  86 115 155* 231* 135*           Review and interpretation of imaging: Portable chest x-ray is as follows:    FINDINGS: The heart is normal in size. The lungs are clear. The  mediastinum is unremarkable. There is no pneumothorax.  There are no  acute osseous abnormalities. There is evidence of old calcified  granulomatous disease.  ET tube present with the tip 2.4 cm above the claude. NG tube is stable  in position with the side port and tip in the fundus of the stomach.  Left IJ catheter is in stable position.     IMPRESSION:  Stable chest with stable position of support lines and  catheters..    CT Head Without Contrast    Result Date: 9/27/2024  PROCEDURE: CT HEAD WO CONTRAST-  HISTORY: anoxic brain injury; R09.2-Respiratory arrest; T17.308A-Unspecified foreign body in larynx  causing other injury, initial encounter  COMPARISON: September 24, 2024.  TECHNIQUE: Multiple axial CT images were performed from the foramen magnum to the vertex without enhancement.  FINDINGS: There is decrease in size of sulci. There is mild dilatation of the ventricles which has decreased. Gray-white matter differentiation is still seen. Findings may represent mild edema with anoxic brain injury in the differential diagnosis. There is fairly diffuse decreased attenuation in the cerebellum compatible with edema. Decreased sulci are also seen in the cerebellum, more prominent than in cerebrum.      Impression: Evidence of edema, more pronounced in the cerebellum than cerebrum, compatible with anoxic brain injury.   DLP: 520.06 mGy.cm CTDI: 28.77 mGy   This study was performed with techniques to keep radiation doses as low as reasonably achievable (ALARA). Individualized dose reduction techniques using automated exposure control or adjustment of mA and/or kV according to the patient size were employed.     Images were reviewed, interpreted, and dictated by Dr. Ana Pacheco MD Transcribed by Sarah Bruno PA-C.  This report was signed and finalized on 9/27/2024 1:42 PM by Ana Pacheco MD.      CT Head Without Contrast    Result Date: 9/24/2024  FINAL REPORT TECHNIQUE: null CLINICAL HISTORY: post code COMPARISON: null FINDINGS: CT HEAD WITHOUT CONTRAST Comparison: None Findings: There is motion artifact. No acute intracranial hemorrhage, extra-axial fluid collection, hydrocephalus or midline shift. Age appropriate generalized parenchymal atrophy. There are periventricular and subcortical white matter hypodensities which are nonspecific but most likely related to microangiopathic gliosis. Intracranial arteriosclerosis. There is no sinus or mastoid fluid. Visualized orbits: No acute abnormalities. There is no acute fracture.     Impression: IMPRESSION: 1. No acute intracranial process. Authenticated and  Electronically Signed by Becki Franklin DO on 09/24/2024 09:01:33 PM     CT Angiogram Chest    Result Date: 9/24/2024  FINAL REPORT TECHNIQUE: null CLINICAL HISTORY: respiratory arrest COMPARISON: null FINDINGS: Exam: CTA Chest with IV contrast. Procedure: Coronal and sagittal MIP reformats were performed Comparison: None Clinical history: Cardiac arrest Findings: No pulmonary emboli. No thoracic aortic aneurysm or dissection. No hilar or mediastinal adenopathy. No pericardial fluid collection. No pleural fluid collection. Acute fractures of the left 5, 6 and 7 anterior ribs. Acute fractures of the right anterior 5 and 6 ribs. No pneumothorax. Mild bibasilar atelectasis. The right upper arm is only partially visualized. Low-density ill-defined fluid and air seen in the soft tissues of the right upper arm. T3 compression fracture with 10% loss of vertebral body height. Age of the fracture is indeterminate. T12 compression fracture with 25% loss of vertebral body height. Age of the fracture is indeterminate.     Impression: Impression: 1. No pulmonary emboli. 2. No thoracic aortic aneurysm or dissection 3. Acute fractures of the left 5th-7th anterior ribs and right anterior 6th and 7th ribs. No pneumothorax. Mild bibasilar atelectasis. 4. The right upper arm is only partially visualized. Low-density ill-defined fluid and air seen in the soft tissues of the right upper arm. Please correlate clinically. 5. T3 and T12 compression fractures of indeterminate age. Please correlate with physical exam. MRI could be considered for further evaluation. Authenticated and Electronically Signed by Sarah Peralta MD on 09/24/2024 09:21:12 PM      The repeat CT of the brain was showing the following:    FINDINGS: There is mild, age-appropriate, stable generalized cerebral  atrophy. The ventricles are enlarged. There is decreased attenuation in  a significant portion of the cerebellum with loss of some sulci  suggesting edema,  similar to the prior exam. There is decreased  attenuation in the caudate nuclei bilaterally, particularly compared to  09/24/2024 as well as decreased attenuation in the external capsules  bilaterally. Lateral ventricles are mildly decreased in size,  particularly compared to 09/24/2024. There is some loss of cerebral  sulci compared to 09/27/2024. No masses are identified. No extra-axial  fluid is seen. The paranasal sinuses are unremarkable.     IMPRESSION:  Evidence of cerebral edema involving the cerebrum and the  cerebellum as described slightly worsened compared to 09/27/2024.    Workup to date: The phenytoin level is as follows:     Latest Reference Range & Units 09/26/24 05:06 09/27/24 04:29 09/28/24 04:22 09/29/24 04:01   Phenytoin Level 10.0 - 20.0 mcg/mL 10.2 19.4 17.0 12.6            Diagnoses: Patient with witnessed cardiac arrest outside the hospital with resuscitation taking place in approximately 20 minutes but currently comatose with Royce Coma Scale of 5.  The patient is already exhibiting cerebellar swelling.      Comment: CT of the head performed at [facility] in 2021 was interpreted as follows:  FINDINGS:      HEMORRHAGE: No evidence of acute intracranial hemorrhage.    MASS EFFECT / MASS LESION: No mass effect.  There is no evidence of an  intracranial mass or extraaxial fluid collection.      ACUTE ISCHEMIC CHANGE: None.    CHRONIC ISCHEMIC CHANGE: Patchy foci of  low attenuation coefficient are present  within white matter which is a nonspecific finding but likely represents  moderate microvascular ischemia. Small lacunar infarcts right basal ganglia.    PARENCHYMA: There is mild generalized volume loss for age.  The brain parenchyma  is otherwise within normal limits for age.    VENTRICLES: Normal caliber and morphology.    OTHER: The visualized calvarium, skull base, orbits and extracranial soft  tissues are normal. The visualized paranasal sinuses and mastoid air cells  are  clear.  Exam End: 12/10/21 18:40    Specimen Collected: 12/10/21 18:40 Last Resulted: 12/10/21 18:49   Received From: [facility]  Result Received: 07/28/22 16:30          Plan:  1.  As the repeat CT of the brain is showing evidence of cerebral edema which has worsened compared to 2 days ago when it was only involving the cerebellum, prognosis is extremely grave and the patient should be on palliative care at this point..  2.  Palliative care is currently working with the patient and they have decided about not doing any further blood work.  3.  To keep her comfortable as much as possible.  4.  I discussed with discussed with the patient's family members and with Dr. Sharath Monae, the hospitalist and at this time I do not have any further recommendations and will be signing off..    Spent a total of 30 minutes in face-to-face evaluation and management of the patient using the dedicated telemedicine device without any interruption with the help of Sabrina the rounding nurse with the patient located at the [facility] and myself at a remote location.    Electronically signed by Cheikh Kiran MD, 09/29/24, 9:01 AM EDT.

## 2024-09-29 NOTE — PLAN OF CARE
Problem: Skin Injury Risk Increased  Goal: Skin Health and Integrity  Intervention: Optimize Skin Protection  Recent Flowsheet Documentation  Taken 9/29/2024 0946 by Fernando Ackerman RRT  Head of Bed (HOB) Positioning: HOB elevated  Taken 9/29/2024 0734 by Fernando Ackerman RRT  Head of Bed (HOB) Positioning: HOB elevated  Skin Protection: tubing/devices free from skin contact     Problem: Adult Inpatient Plan of Care  Goal: Absence of Hospital-Acquired Illness or Injury  Intervention: Prevent Skin Injury  Recent Flowsheet Documentation  Taken 9/29/2024 0734 by Fernando Ackerman RRT  Skin Protection: tubing/devices free from skin contact     Problem: Skin and Tissue Injury (Mechanical Ventilation, Invasive)  Goal: Absence of Device-Related Skin and Tissue Injury  Intervention: Maintain Skin and Tissue Health  Recent Flowsheet Documentation  Taken 9/29/2024 0734 by Fernando Ackerman RRT  Device Skin Pressure Protection: positioning supports utilized     Problem: Ventilator-Induced Lung Injury (Mechanical Ventilation, Invasive)  Goal: Absence of Ventilator-Induced Lung Injury  Intervention: Prevent Ventilator-Associated Pneumonia  Recent Flowsheet Documentation  Taken 9/29/2024 0946 by Fernando Ackerman RRT  Head of Bed (HOB) Positioning: HOB elevated  Taken 9/29/2024 0734 by Fernando Ackerman RRT  Head of Bed (HOB) Positioning: HOB elevated  VAP Prevention Bundle:   HOB elevation maintained   readiness to extubate assessed   vent circuit breaks minimized  VAP Prevention Measures: completed  Intervention: Facilitate Lung-Protection Measures  Recent Flowsheet Documentation  Taken 9/29/2024 0734 by Fernando Ackerman RRT  Lung Protection Measures:   low inspiratory pressure provided   low tidal volume provided   lung compliance monitored   plateau/inspiratory pressure monitored   ventilator synchrony promoted   ventilator waveforms monitored   Goal Outcome Evaluation:

## 2024-09-29 NOTE — PLAN OF CARE
Goal Outcome Evaluation:  Plan of Care Reviewed With: patient        Progress: no change     VSS. Pt remains intubated on ventilator on 30% FiO2.     Pt still has cough and gag reflex as well as flexion from pain however does not follow commands.     Precedex gtt D/C this shift.     Pts pupils 2mm and fixed throughout shift, repeat head CT obtained this AM per Neurologist order.     Pts family request to continue tube feeding and ventilation however has been switched to comfort care per MD order.

## 2024-09-30 NOTE — PLAN OF CARE
Goal Outcome Evaluation:  Plan of Care Reviewed With: patient        Progress: no change     NSR/STACH on monitor. Pt has been hypertensive throughout shift. PRN meds administered for pain and increase secretions. Pt pupils still pinpoint and non reactive. NG tube removed this shift per family request. Pt still intubated with FIO2 of 30%.

## 2024-09-30 NOTE — PROGRESS NOTES
RT EQUIPMENT DEVICE RELATED - SKIN ASSESSMENT    Blake Score:  Blake Score: 9     RT Medical Equipment/Device:     ETT Gates/Anchorfast    Skin Assessment:      Cheek:  Intact    Device Skin Pressure Protection:  Positioning supports utilized    Nurse Notification:  Zhane Ackerman, RRT

## 2024-09-30 NOTE — PLAN OF CARE
Problem: Skin Injury Risk Increased  Goal: Skin Health and Integrity  Intervention: Optimize Skin Protection  Recent Flowsheet Documentation  Taken 9/30/2024 0647 by Fernando Ackerman, RRT  Head of Bed (HOB) Positioning: HOB elevated  Skin Protection: tubing/devices free from skin contact     Problem: Adult Inpatient Plan of Care  Goal: Absence of Hospital-Acquired Illness or Injury  Intervention: Prevent Skin Injury  Recent Flowsheet Documentation  Taken 9/30/2024 0647 by Fernando Ackerman, RRT  Skin Protection: tubing/devices free from skin contact     Problem: Communication Impairment (Mechanical Ventilation, Invasive)  Goal: Effective Communication  Outcome: Progressing     Problem: Skin and Tissue Injury (Mechanical Ventilation, Invasive)  Goal: Absence of Device-Related Skin and Tissue Injury  Intervention: Maintain Skin and Tissue Health  Recent Flowsheet Documentation  Taken 9/30/2024 0647 by Fernando Ackerman, RRT  Device Skin Pressure Protection: positioning supports utilized   Goal Outcome Evaluation:

## 2024-09-30 NOTE — PROGRESS NOTES
"  CC: Acute Respiratory Failure.  Status post cardiac arrest    S: Intubated.  No response noted to loud verbal stimulus.    ROS: Could not be obtained as the patient is on mechanical ventilator.    O:Vital signs reviewed.    /77   Pulse 90   Temp 97.5 °F (36.4 °C) (Oral)   Resp 23   Ht 139.7 cm (55\")   Wt 51.4 kg (113 lb 5.1 oz)   SpO2 97%   BMI 26.34 kg/m²     Temp (24hrs), Av °F (36.7 °C), Min:97 °F (36.1 °C), Max:99.2 °F (37.3 °C)    Vent Day #  6  FiO2: 30-35 %.   PEEP: 5  Peak Pressure: 14    CVP Line. Day # 6.   Butler catheter present.   NG/OG tube present.     I & Os reviewed.   Intake/Output         24 0700 - 24 0659 24 0700 - 10/01/24 0659    Intake (ml) 3626 --    Output (ml) 1850 250    Net (ml) 1776 -250    Last Weight 51.4 kg (113 lb 5.1 oz) --            Net IO Since Admission: 9,136.83 mL [24 0945]    General/Constitutional: Intubated.  Sedated  Eyes: PERRL. Eyes were closed  Neck:  Supple with out obvious JVD. No obvious masses noted.   Cardiovascular: S1 + S2.  Regular  Lungs/Respiratory: Transmitted Breath sounds noted.  No wheezing heard.  No significant crackles noted  GI/Abdomen: Soft. Bowel sounds positive.  No obvious organomegaly noted.  Musculoskeletal/Extremities: No edema noted. Gait could not be assessed at this time, as the patient was laying in bed.   Neurologic: Sedated. Intubated.       Labs: Reviewed.   Results from last 7 days   Lab Units 24  0401 24  0422 24  0716 24  0506 24  0412 24  1828   WBC 10*3/mm3 6.28 6.31 10.82* 15.26* 18.45* 12.31*   HEMOGLOBIN g/dL 8.7* 6.8* 7.2* 7.8* 9.5* 9.6*   HEMATOCRIT % 26.9* 21.5* 23.0* 24.7* 29.5* 33.3*   PLATELETS 10*3/mm3 169 148 176 243 302 250   NEUTROPHIL % %  --   --  79.9* 83.4*  --   --    NEUTROS ABS 10*3/mm3  --   --  8.65* 12.74*  --  4.19   EOSINOPHIL % %  --   --  0.5 0.2*  --   --    EOS ABS 10*3/mm3  --   --  0.05 0.03  --  0.62*   LYMPHOCYTE % %  --   " "--  14.5* 11.1*  --   --    LYMPHS ABS 10*3/mm3  --   --  1.57 1.69  --   --        No results found for: \"PROCALCITO\"    Lab Results   Component Value Date    CRP 0.45 09/24/2024       No results found for: \"SEDRATE\"    No results found for: \"PROBNP\"    Results from last 7 days   Lab Units 09/29/24  0401 09/28/24  0422 09/27/24  0716 09/26/24  0506   SODIUM mmol/L 129* 130* 133* 145   POTASSIUM mmol/L 3.5 3.4* 3.3* 3.1*   CHLORIDE mmol/L 98 101 98 103   CO2 mmol/L 20.5* 22.4 24.1 30.3*   BUN mg/dL 12 16 23 30*   CREATININE mg/dL 0.80 0.97 1.16* 1.33*   CALCIUM mg/dL 7.3* 7.4* 7.7* 8.0*   ANION GAP mmol/L 10.5 6.6 10.9 11.7   BILIRUBIN mg/dL  --  0.2 0.3 0.4   ALK PHOS U/L  --  86 115 155*   ALT (SGPT) U/L  --  71* 94* 150*   AST (SGOT) U/L  --  132* 179* 202*   GLUCOSE mg/dL 182* 190* 158* 155*   TOTAL PROTEIN g/dL  --  4.7* 5.4* 5.9*   ALBUMIN g/dL  --  2.4* 2.6* 3.0*       Results from last 7 days   Lab Units 09/29/24  0401 09/25/24  0412 09/24/24  1828   MAGNESIUM mg/dL 1.7 3.0* 2.2   PHOSPHORUS mg/dL 1.7* 3.3 8.0*       No results found for: \"TSH\"    No results found for: \"FREET4\"    Results from last 7 days   Lab Units 09/24/24  1828   INR  1.32*       No results found for: \"CKTOTAL\"    No components found for: \"HSTROPT\"    No results found for: \"TROPONINT\"    No results found for: \"DDIMER\"    No results found for: \"LIPASE\"    Brief Urine Lab Results  (Last result in the past 365 days)        Color   Clarity   Blood   Leuk Est   Nitrite   Protein   CREAT   Urine HCG        09/24/24 2040 Yellow   Cloudy   Moderate (2+)   Small (1+)   Negative   100 mg/dL (2+)                     Micro: As of September 30, 2024   No results found for: \"RESPCX\"  No results found for: \"BCIDPCR\"  Lab Results   Component Value Date    BLOODCX No growth at 5 days 09/24/2024    BLOODCX No growth at 5 days 09/24/2024     Lab Results   Component Value Date    URINECX (A) 09/24/2024     >100,000 CFU/mL Klebsiella pneumoniae ssp " "pneumoniae     No results found for: \"MRSACX\"  Lab Results   Component Value Date    MRSAPCR No MRSA Detected 09/27/2024     No results found for: \"URCX\"  No components found for: \"LOWRESPCF\"  No results found for: \"THROATCX\"  No results found for: \"CULTURES\"  No components found for: \"STREPBCX\"  No results found for: \"STREPPNEUAG\"  No results found for: \"LEGIONELLA\"  No results found for: \"LEGANTIGENUR\"  No results found for: \"MYCOPLASCX\"  No results found for: \"GCCX\"  No results found for: \"WOUNDCX\"  No results found for: \"BODYFLDCX\"    No results found for: \"FLU\"    No results found for: \"ADENOVIRUS\"  No results found for: \"IY450P\"  No results found for: \"CVHKU1\"  No results found for: \"CVNL63\"  No results found for: \"CVOC43\"  No results found for: \"HUMETPNEVS\"  No results found for: \"HURVEV\"  No results found for: \"FLUBPCR\"  No results found for: \"PARAINFLUE\"  No results found for: \"PARAFLUV2\"  No results found for: \"PARAFLUV3\"  No results found for: \"PARAFLUV4\"  No results found for: \"BPERTPCR\"  No results found for: \"DITVR29699\"  No results found for: \"CPNEUPCR\"  No results found for: \"MPNEUMO\"  No results found for: \"FLUAPCR\"  No results found for: \"FLUAH3\"  No results found for: \"FLUAH1\"  No results found for: \"RSV\"  No results found for: \"BPARAPCR\"    COVID 19:  No results found for: \"COVID19\"        ABG:   Recent Labs     09/24/24  1949 09/26/24  0724 09/28/24  0746   PHART 7.362 7.516* 7.432   OKU2NYB 32.8* 39.3 36.8   PO2ART 452.0* 113.0* 109.0*   PDL8DVB 18.6* 31.8* 24.5   BASEEXCESS -6.1* 8.2* 0.3       Lab Results   Component Value Date    LACTATE 3.1 (C) 09/25/2024    LACTATE 4.8 (C) 09/25/2024    LACTATE 8.9 (C) 09/24/2024         Echo:                Imaging: Latest imaging study was reviewed personally.    Imaging Results (Last 24 Hours)       Procedure Component Value Units Date/Time    CT Head Without Contrast [[id number]] Collected: 09/29/24 1023     Updated: 09/29/24 1027    Narrative:      " PROCEDURE: CT HEAD WO CONTRAST-     HISTORY: To look for cerebral edema from anoxic brain injury;  R09.2-Respiratory arrest; T17.308A-Unspecified foreign body in larynx  causing other injury, initial encounter     COMPARISON: 2 days prior.     TECHNIQUE: Multiple axial CT images were performed from the foramen  magnum to the vertex. Individualized dose reduction techniques using  automated exposure control or adjustment of mA and/or kV according to  the patient size were employed.     FINDINGS: There is mild, age-appropriate, stable generalized cerebral  atrophy. The ventricles are enlarged. There is decreased attenuation in  a significant portion of the cerebellum with loss of some sulci  suggesting edema, similar to the prior exam. There is decreased  attenuation in the caudate nuclei bilaterally, particularly compared to  09/24/2024 as well as decreased attenuation in the external capsules  bilaterally. Lateral ventricles are mildly decreased in size,  particularly compared to 09/24/2024. There is some loss of cerebral  sulci compared to 09/27/2024. No masses are identified. No extra-axial  fluid is seen. The paranasal sinuses are unremarkable.       Impression:      Evidence of cerebral edema involving the cerebrum and the  cerebellum as described slightly worsened compared to 09/27/2024.              CTDI: 32.17 mGy  DLP:504.09 mGy.cm     This report was signed and finalized on 9/29/2024 10:25 AM by Ana Pacheco MD.                 Assessment & Recommendations/Plan:   1.  Acute Respiratory Failure  Not stable for extubation today, due to continued encephalopathy, likelihood of anoxic brain injury and worsened CT head findings.  Chest x-ray and ABG as clinically indicated.    2.  Status post cardiac arrest  Unknown amount of time.     3.  Anoxic brain injury  Neurology following.  Latest CT of the head showed changes compatible with worsening cerebral edema compared to 9/27/2024.      4.  Metabolic  "acidosis/shock liver  Metabolic acidosis seems to have improved  Liver enzymes are slowly decreasing  Will continue clinical follow-up    5.  Colitis/infection  On antibiotics per hospitalist service  Temp (24hrs), Av °F (36.7 °C), Min:97 °F (36.1 °C), Max:99.2 °F (37.3 °C)  Temp (72hrs), Av.1 °F (36.7 °C), Min:94.5 °F (34.7 °C), Max:100 °F (37.8 °C)    No results found for: \"PROCALCITO\"  Lab Results   Component Value Date    WBC 6.28 2024    WBC 6.31 2024    WBC 10.82 (H) 2024     6.  Hematologic/anemia  Currently receiving PRBC.  Multifactorial.  We will continue to monitor closely  Lab Results   Component Value Date    HGB 8.7 (L) 2024    HGB 6.8 (C) 2024    HGB 7.2 (L) 2024     Lab Results   Component Value Date     2024     2024     2024     Lab Results   Component Value Date    INR 1.32 (H) 2024     7.  Renal/Fluid status/Electrolytes  Will follow hyponatremia as clinically indicated.  Not clinically significant at this time  Likely will have no bearing on clinical outcome, even if correction is attempted.  Lab Results   Component Value Date     (L) 2024     (L) 2024     (L) 2024    K 3.5 2024    K 3.4 (L) 2024    K 3.3 (L) 2024     Lab Results   Component Value Date    BUN 12 2024    BUN 16 2024    BUN 23 2024    CREATININE 0.80 2024    CREATININE 0.97 2024    CREATININE 1.16 (H) 2024     Net IO Since Admission: 9,136.83 mL [24 0945]  No results found for: \"PROBNP\"    8.  GI/nutrition  Nutrition per Dietician.   GI prophylaxis per admitting attending.    9.  DVT prophylaxis  Per admitting attending.    10.  Miscellaneous  Had a long discussion with the patient daughters and granddaughter at the bedside.  They are aware of the poor prognosis.    The patient's family members want to discuss the possibility of terminal extubation " amongst themselves.  I once again shared the findings on the latest repeat CT of the head, and conveyed to the patient's family regarding extremely poor prognosis due to worsening findings on the last CT scan.    The patient remains at significant risk for organ dysfunction and damage, requiring multiple measures to support and sustain organ function.  Condition remains critical.    Critical Care time spent in direct patient care: 32 minutes including high complexity decision making to assess, manipulate, and support vital organ system failure in this individual who has impairment of one or more vital organ systems such that there is a high probability of imminent or life threatening deterioration in the patient’s condition.  This time includes multiple reassessments throughout the day, if needed and as appropriate.  This time excludes other billable procedures. Time does include preparation of documents, review of old records, coordinating care with other providers and direct bedside care.    I have discussed patient's overall clinical status with Dr. Monae, especially with regards to my discussion with the family regarding worsening CT of the head findings and likely anoxic brain injury and very poor overall prognosis.      I also discussed with the nursing staff and told them to hold tube feeds given significant residual volume of 600 or more.    Plan was also discussed with nursing staff, as necessary.     This document was electronically signed by Vincent Castro MD on 09/30/24 at 09:45 EDT      Dictated utilizing Dragon dictation.

## 2024-09-30 NOTE — PROGRESS NOTES
"Dietitian Follow-up    Patient Name: Annabel Hyman  YOB: 1940  MRN: [id number]  Admission date: 9/24/2024    Comment:    Clinical Nutrition Follow-up   Encounter Information        Trending Narrative     9/30: Patient now comfort care - family requests to continue tube feeding. Patient on low dose TF currently running @ 20mL/hr x 22hrs. Per conversation w/ RN - patient not tolerating well. MAP 105mmHg. Phosphorus low.     9/28: MAP score improved to 77mmHg - and TF resumed to 20mL/hr per intake chart. Patient's family to meet with palliative today to discuss goals of care d/t recent CT results. Will continue to recommend patient remains on low dose tube feeds of 20mL/hr. Provider reports TF stopped at one point yesterday due to high residuals. RD to continue to follow-up and monitor.      9/27: Patient w/ 500mL NG-tube aspirate - will continue low dose TF. MAP score is 58mmHg - recommend holding TF regimen until MAP score is >65mmHg.      9/26: Patient remains NPO/intubated/sedated. RD consulted to provide enteral nutrition recommendations.      9/25: Patient with MST score of 2 - unsure of recent weight loss d/t chart review. Patient currently NPO w/ NG-tube - intubated and sedated. Propofol ordered 1.42-14.22mL/hr providing 38-375kcals/day. MAP is 121mmHg and lactate 3.1mmol/L. Will continue to follow-up and monitor.        Anthropometrics        Current Height, Weight Height: 139.7 cm (55\")  Weight: 51.4 kg (113 lb 5.1 oz) (09/30/24 0500)       Trending Weight Hx     This admission:              PTA:     Wt Readings from Last 30 Encounters:   09/30/24 0500 51.4 kg (113 lb 5.1 oz)   09/29/24 0400 48.9 kg (107 lb 12.9 oz)   09/28/24 0400 48.5 kg (106 lb 14.8 oz)   09/27/24 0400 47.6 kg (104 lb 15 oz)   09/26/24 0545 48.1 kg (106 lb 0.7 oz)   09/25/24 0600 47.4 kg (104 lb 8 oz)   09/24/24 2315 47.4 kg (104 lb 8 oz)   09/24/24 1808 49 kg (108 lb)      BMI kg/m2 Body mass index is 26.34 kg/m². "     Labs        Pertinent Labs Results from last 7 days   Lab Units 09/29/24  0401 09/28/24  0422 09/27/24  0716 09/26/24  0506   SODIUM mmol/L 129* 130* 133* 145   POTASSIUM mmol/L 3.5 3.4* 3.3* 3.1*   CHLORIDE mmol/L 98 101 98 103   CO2 mmol/L 20.5* 22.4 24.1 30.3*   BUN mg/dL 12 16 23 30*   CREATININE mg/dL 0.80 0.97 1.16* 1.33*   CALCIUM mg/dL 7.3* 7.4* 7.7* 8.0*   BILIRUBIN mg/dL  --  0.2 0.3 0.4   ALK PHOS U/L  --  86 115 155*   ALT (SGPT) U/L  --  71* 94* 150*   AST (SGOT) U/L  --  132* 179* 202*   GLUCOSE mg/dL 182* 190* 158* 155*     Results from last 7 days   Lab Units 09/29/24  0401 09/26/24  0506 09/25/24  0412 09/24/24  1828   MAGNESIUM mg/dL 1.7  --  3.0* 2.2   PHOSPHORUS mg/dL 1.7*  --  3.3 8.0*   HEMOGLOBIN g/dL 8.7*   < > 9.5* 9.6*   HEMATOCRIT % 26.9*   < > 29.5* 33.3*    < > = values in this interval not displayed.         Medications    Scheduled Medications chlorhexidine, 15 mL, Mouth/Throat, Q12H  fosphenytoin (Cerebyx) 100 mg PE in sodium chloride 0.9 % 50 mL IVPB, 100 mg PE, Intravenous, Q8H  levETIRAcetam, 500 mg, Intravenous, Q12H  pantoprazole, 40 mg, Intravenous, Q24H  sodium chloride, 10 mL, Intravenous, Q12H        Infusions      PRN Medications   acetaminophen    Glycopyrrolate PF    LORazepam    Morphine    nitroglycerin    sodium chloride    sodium chloride     Physical Findings        Trending Physical   Appearance, NFPE    --  Edema  Edema noted    Bowel Function 9/30   Tubes CVC  Peripheral IV   NG-tube   Chewing/Swallowing NPO   Skin WNL   --  Current Nutrition Orders & Evaluation of Intake       Oral Nutrition     Food Allergies    Current PO Diet NPO Diet NPO Type: Tube Feeding   Supplement    PO Evaluation     Trending % PO Intake NPO     Nutrition Diagnosis         Nutrition Dx Problem 1 Needs alternate r/t NPO diet regimen as evidenced by patient with NG-tube and need for enteral nutrition when medically appropriate         Nutrition Dx Problem 2        Intervention Goal          Intervention Goal(s) Continue TF regimen @ 20mL/hr  Maintain current body weight   MAP >65mmHg - in order to continue enteral nutrition regimen      Nutrition Intervention        RD Action No action at this time - will continue to monitor and follow-up        Nutrition Prescription          Diet Prescription    Supplement Prescription    Enteral Nutrition Prescription  Peptamen AF @ 20mL/hr - DO NOT ADVANCE  Free water flush of 120mL q4hrs    TPN Prescription       Monitor/Evaluation        Monitor Per protocol, I&O, Pertinent labs, EN delivery/tolerance, Weight, Skin status, GI status, Symptoms, Hemodynamic stability     RD to follow-up.  Electronically signed by:  Chel Zaragoza RD  09/30/24 10:12 EDT

## 2024-09-30 NOTE — PLAN OF CARE
Goal Outcome Evaluation:         Spoke with pt's family  members at her bedside.  Family members were grateful for a larger ICU  room.  I listened as pt's son talked about the Pt's condition and hope for her recovery.  He shared that the assistance of the respirator and medications were being weaned to determine how the pt is progressing.

## 2024-09-30 NOTE — PROGRESS NOTES
"    [facility]     PALLIATIVE CARE FOLLOW UP NOTE    Name:  Annabel Hyman   Age:  84 y.o.  Sex:  female  :  1940  MRN:  [id number]   Visit Number:  [id number]  Date Of Service:  24  Primary Care Physician:  Provider, No Known    Chief Complaint: cardiac arrest and suspected anoxic encephalopathy    Interval History:  Patient seen and evaluated this morning.  Per chart review, transitioned to comfort measures over the weekend.  She is not currently on sedation.  Pressor support discontinued and she is hypertensive at the present.  Neurology followed and CT imaging repeated which noted worsening edema.  Family requested to continue with artificial nutrition, however this is on hold secondary to high residuals, patient with large amount of emesis .  Met with family at bedside to further discuss GOC.  I reviewed artifical nutrition and concerns with continued use, given clinical findings.  All questions/concerns addressed, ultimately family understanding and agreeable to discontinuing.  I further discussed anticipated trajectory with interventions in place.  I briefly introduced compassionate extubation, reviewing the process involved and medications utilized to manage anticipated symptoms.  Family note that patient was visited by their  over the weekend, noting no additional spiritual needs at this time.      Review of Systems   Unable to perform ROS: Intubated          Pain Assessment  CPOT Facial Expression: 0-->relaxed, neutral  CPOT Body Movements: 0-->absence of movements  CPOT Muscle Tension: 0-->relaxed  Ventilator Compliance/Vocalization: 0-->tolerating ventilator or movement  CPOT Score: 0  Vitals: /77   Pulse 90   Temp 97.5 °F (36.4 °C) (Oral)   Resp 23   Ht 139.7 cm (55\")   Wt 51.4 kg (113 lb 5.1 oz)   SpO2 97%   BMI 26.34 kg/m²     Physical Exam  Vitals and nursing note reviewed.   Constitutional:       General: She is not in acute distress.     " Appearance: She is ill-appearing. She is not diaphoretic.      Interventions: She is sedated and intubated.      Comments: Frail appearing, elderly female lying in bed   HENT:      Head: Normocephalic and atraumatic.      Nose:      Comments: NG noted     Mouth/Throat:      Comments: ETT noted  Cardiovascular:      Rate and Rhythm: Normal rate and regular rhythm.   Pulmonary:      Effort: Pulmonary effort is normal. No respiratory distress. She is intubated.      Comments: MV  Musculoskeletal:      Cervical back: Neck supple.   Skin:     General: Skin is warm and dry.      Capillary Refill: Capillary refill takes less than 2 seconds.   Neurological:      Comments: Unable to fully assess, occasionally will flutter eyes, no purposeful movements observed    Psychiatric:      Comments: Calm affect, does not appear agitated or restless          Results Reviewed:    Intake/Output Summary (Last 24 hours) at 9/30/2024 1244  Last data filed at 9/30/2024 0700  Gross per 24 hour   Intake 1474 ml   Output 2100 ml   Net -626 ml     Results from last 7 days   Lab Units 09/29/24  0401 09/28/24  0422   SODIUM mmol/L 129* 130*   POTASSIUM mmol/L 3.5 3.4*   CHLORIDE mmol/L 98 101   CO2 mmol/L 20.5* 22.4   BUN mg/dL 12 16   CREATININE mg/dL 0.80 0.97   CALCIUM mg/dL 7.3* 7.4*   BILIRUBIN mg/dL  --  0.2   ALK PHOS U/L  --  86   ALT (SGPT) U/L  --  71*   AST (SGOT) U/L  --  132*   GLUCOSE mg/dL 182* 190*     Results from last 7 days   Lab Units 09/29/24  0401   WBC 10*3/mm3 6.28   HEMOGLOBIN g/dL 8.7*   HEMATOCRIT % 26.9*   PLATELETS 10*3/mm3 169       Medication Review:   I have reviewed the patients active and prn medications.     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  Acute hypoxic and hypercapnic respiratory failure   Aspiration pneumonitis  Acute renal failure  Acute ischemic hepatitis  Stercoral colitis  Severe metabolic acidosis and lactic acidsos  Hypernatremia  Alzheimer's  dementia  History of CVA  Impaired mobility and ADLs    Recommendations/Plan:  - GOC discussions yield desire to pursue comfort focused care, electing COMFORT MEASURES  - Artificial nutrition discussed in detail today, family in agreement with stopping artifical nutrition; would recommend to remove NG  - Briefly discussed compassionate extubation; family wish to maintain ventilator at this time, will continue to discuss  - Appears that at baseline patient with advanced dementia, family describing at least FAST 7a with PPS 40%  - Difficult to prognosticate, however patient is high risk for decompensation even with continued use of MV  - Patient likely qualifies for hospice services, however at the present too unstable for transport; defer discussions at this time  - Palliative care will continue to follow/support patient and family    Recommendations:  - Family wish to focus on comfort, have encouraged use of pain medication for signs/symptoms concerning for the same  - Continue to encourage visitation at bedside, patient with large family and will inquire about how to better accommodate  - Continue ativan as needed for agitation       CODE STATUS:   Code Status and Medical Interventions: No CPR (Do Not Attempt to Resuscitate); Comfort Measures   Ordered at: 09/29/24 0945     Level Of Support Discussed With:    Next of Kin (If No Surrogate)    Health Care Surrogate     Code Status (Patient has no pulse and is not breathing):    No CPR (Do Not Attempt to Resuscitate)     Medical Interventions (Patient has pulse or is breathing):    Comfort Measures         I spent 50 total minutes, including face to face assessment, record review, coordination of care with staff, and counseling patient and/or family  Part of this note may be an electronic transcription/translation of spoken language to printed text using the Dragon Dictation System.    Kelsie Kang PA-C  09/30/24  12:44 EDT

## 2024-09-30 NOTE — PROGRESS NOTES
RT EQUIPMENT DEVICE RELATED - SKIN ASSESSMENT    Blake Score:  Blake Score: 9     RT Medical Equipment/Device:     ETT Gates/Anchorfast    Skin Assessment:      Lips:  Intact    Device Skin Pressure Protection:  Positioning supports utilized    Nurse Notification:  Zhane Urias, CRT

## 2024-09-30 NOTE — PROGRESS NOTES
[facility]    Name:  Annabel Hmyan   Age:  84 y.o.  Sex:  female  :  1940  MRN:  [id number]   Visit Number:  [id number]  Admission Date:  2024  Date Of Service:  24  Primary Care Physician:  Provider, No Known     LOS: 6 days :    Chief Complaint:      Follow-up of cardiac arrest and suspected anoxic encephalopathy.    Subjective:    Annabel Hyman was seen and examined this morning.  Patient has had no change in clinical status.  She does seem to have quasi purposeful movements around the mouth, eyes and decerebrate posturing at times.  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  Family has decided to hold off on tube feedings and we will discontinue the NG tube.  Family is now coming on board with comfort measures but they still do not want to make a decision about terminal extubation.    Hospital Course:    Annabel Hyman is an 84-year-old female, chronically wheelchair-bound, history of CVA, hypertension, Alzheimer dementia was brought to the emergency room by EMS after cardiac arrest at home.  Patient apparently was sitting up and eating a cake while a strawberry got stuck in her throat.  She subsequently became unresponsive and turned blue.  Family started CPR and apparently took about 15 minutes for EMS arrival who continued the CPR with the Matthew device.  Family states that the patient did not turn cyanotic prior to EMS arrival.  Patient was transported to the ER with ongoing CPR and after she was in the ER and another dose of epinephrine, ROSC was obtained.  Patient was intubated and a left IJ central line was placed.  She was initiated on Levophed and was admitted to the ICU.    Initial vitals: Temperature 99.2, pulse 121, blood pressure 138/56 with subsequent drop to 83/49.  Pulse oxygen saturation 89% on arrival.  Initial ABG pH less than  6.7, pCO2 71, p.o. 213, bicarb 9.6 on 100% Ambu bag.  CMP showed a potassium of 5.3, BUN 28, creatinine 1.46 (unknown baseline), glucose 240, , .  Lactic acid was 14.  C-reactive protein 0.45.  INR 1.32.  WBC 12.3, hemoglobin 9.6.  Chest x-ray showed likely scarring or atelectasis of the left lung base.  Noncontrast CT of the head showed no acute intracranial process.  CT angiogram of the chest was negative for pulmonary embolism or aortic aneurysm.  Showed acute fractures of the left 5-7th anterior ribs and right anterior sixth and seventh ribs.  No pneumothorax.  T3 and T12 compression fractures of indeterminate age noted.  CT of the abdomen and pelvis with contrast showed very large amount of stool in the rectum with mild rectal wall thickening.  Stomach is distended with fluid.  Gallbladder not identified.  Intra and extrahepatic biliary dilatation without choledocholithiasis noted.  L4 and L5 compression fractures of indeterminate age.  Patient was given IV Zosyn and was placed on Precedex, Levophed and was subsequently admitted to the medical ICU.    Patient was seen by Dr. Castro from pulmonology for ventilatory management.  She was also seen by Dr. Kiran for tonic-clonic seizures while on mechanical ventilation.  He recommended Keppra and fosphenytoin.  EEG was ordered.  Patient's blood pressure improved and Levophed was discontinued.  Due to high suspicion of anoxic encephalopathy, palliative care services were consulted.    Patient was continued on Zosyn for aspiration pneumonia.  She continued to have fevers despite being on antibiotics therapy and started dropping her blood pressures.  She was initiated on vancomycin as well as Levophed.  Repeat CT scan of the head without contrast done on 9/27/2024 unfortunately showed evidence of edema, more pronounced in the cerebellum and cerebrum compatible with anoxic brain injury.  After discussion with family and palliative care services, patient's  CODE STATUS was changed to DNR.  Patient continued to be unresponsive and eventually developed pinpoint pupils that were not reactive likely from posterior brain edema and herniation.  After discussion with family, patient's CODE STATUS was changed to comfort measures.      Review of Systems:     All systems were reviewed and negative except as mentioned in subjective, assessment and plan.    Vital Signs:    Temp:  [97.5 °F (36.4 °C)-99.2 °F (37.3 °C)] 97.7 °F (36.5 °C)  Heart Rate:  [] 90  Resp:  [22-23] 22  BP: (144-195)/(62-95) 184/76  FiO2 (%):  [30 %] 30 %    Intake and output:    I/O last 3 completed shifts:  In: 3626 [I.V.:2758; Other:495; NG/GT:373]  Out: 2975 [Urine:2975]  No intake/output data recorded.    Physical Examination:    General Appearance:  Unresponsive on mechanical ventilation.     Head:  Atraumatic and normocephalic.   Eyes: Conjunctivae and sclerae normal, no icterus.  Looks pale.  Pupils bilaterally pinpoint and nonreactive.  Left eye is deviated medially and inferiorly.   Throat: No oral lesions, no thrush, oral mucosa moist.  Endotracheal tube is in place.  NG tube is in place.   Neck: Supple, trachea midline, no thyromegaly.  Left internal jugular central venous line noted.   Lungs:   Breath sounds heard bilaterally equally.  No wheezing or crackles. No Pleural rub or bronchial breathing.   Heart:  Normal S1 and S2, no murmur, no gallop, no rub. No JVD.   Abdomen:   Normal bowel sounds, no masses, no organomegaly. Soft, nontender, nondistended, no rebound tenderness.  Butler catheter is in place.   Extremities: Supple, no edema, no cyanosis, no clubbing.  Poor muscle mass.   Skin: No bleeding or rash.   Neurologic: Unresponsive.  Currently on mechanical ventilation.    Laboratory results:    Results from last 7 days   Lab Units 09/29/24  0401 09/28/24  0422 09/27/24  0716 09/26/24  0506   SODIUM mmol/L 129* 130* 133* 145   POTASSIUM mmol/L 3.5 3.4* 3.3* 3.1*   CHLORIDE mmol/L 98 101  98 103   CO2 mmol/L 20.5* 22.4 24.1 30.3*   BUN mg/dL 12 16 23 30*   CREATININE mg/dL 0.80 0.97 1.16* 1.33*   CALCIUM mg/dL 7.3* 7.4* 7.7* 8.0*   BILIRUBIN mg/dL  --  0.2 0.3 0.4   ALK PHOS U/L  --  86 115 155*   ALT (SGPT) U/L  --  71* 94* 150*   AST (SGOT) U/L  --  132* 179* 202*   GLUCOSE mg/dL 182* 190* 158* 155*     Results from last 7 days   Lab Units 09/29/24  0401 09/28/24  0422 09/27/24  0716   WBC 10*3/mm3 6.28 6.31 10.82*   HEMOGLOBIN g/dL 8.7* 6.8* 7.2*   HEMATOCRIT % 26.9* 21.5* 23.0*   PLATELETS 10*3/mm3 169 148 176     Results from last 7 days   Lab Units 09/24/24  1828   INR  1.32*     Results from last 7 days   Lab Units 09/24/24  2040 09/24/24  1852   BLOODCX   --  No growth at 5 days  No growth at 5 days   URINECX  >100,000 CFU/mL Klebsiella pneumoniae ssp pneumoniae*  --      Results from last 7 days   Lab Units 09/28/24  0746   PH, ARTERIAL pH units 7.432   PO2 ART mm Hg 109.0*   PCO2, ARTERIAL mm Hg 36.8   HCO3 ART mmol/L 24.5     I have reviewed the patient's laboratory results.    Radiology results:    CT Head Without Contrast    Result Date: 9/29/2024  PROCEDURE: CT HEAD WO CONTRAST-  HISTORY: To look for cerebral edema from anoxic brain injury; R09.2-Respiratory arrest; T17.308A-Unspecified foreign body in larynx causing other injury, initial encounter  COMPARISON: 2 days prior.  TECHNIQUE: Multiple axial CT images were performed from the foramen magnum to the vertex. Individualized dose reduction techniques using automated exposure control or adjustment of mA and/or kV according to the patient size were employed.  FINDINGS: There is mild, age-appropriate, stable generalized cerebral atrophy. The ventricles are enlarged. There is decreased attenuation in a significant portion of the cerebellum with loss of some sulci suggesting edema, similar to the prior exam. There is decreased attenuation in the caudate nuclei bilaterally, particularly compared to 09/24/2024 as well as decreased  attenuation in the external capsules bilaterally. Lateral ventricles are mildly decreased in size, particularly compared to 09/24/2024. There is some loss of cerebral sulci compared to 09/27/2024. No masses are identified. No extra-axial fluid is seen. The paranasal sinuses are unremarkable.      Impression: Evidence of cerebral edema involving the cerebrum and the cerebellum as described slightly worsened compared to 09/27/2024.     CTDI: 32.17 mGy DLP:504.09 mGy.cm  This report was signed and finalized on 9/29/2024 10:25 AM by Ana Pacheco MD.     I have reviewed the patient's radiology reports.    Medication Review:     I have reviewed the patient's active and prn medications.     Problem List:      Cardiac arrest    Acute respiratory failure with hypoxia and hypercapnia    Aspiration pneumonitis    Metabolic acidosis    ANDERS (acute kidney injury)    Ischemic hepatitis    Stercoral colitis    Assessment:    Cardiac arrest at home with prolonged CPR, POA.  Severe anoxic encephalopathy with seizures, POA.  Acute hypoxic and hypercapnic respiratory failure likely secondary to #2.  Aspiration pneumonitis, POA.  Acute renal failure, POA.  Septic shock, not POA.  Acute ischemic hepatitis (shock liver), POA.  Constipation with stercoral colitis noted on CT scan.  Severe metabolic acidosis and lactic acidosis secondary to #1, POA.  Hypernatremia, POA.  History of stroke with functional quadriplegia.    Plan:    Anoxic encephalopathy.  - Patient does have severe anoxic encephalopathy with edema of the cerebellum and high risk for herniation.  She is already developing paralytic strabismus.  - Patient is currently having pinpoint pupils likely from worsening brain edema and possible herniation.  - Since the family has decided to make her comfort care we will discontinue all medications and keep her only on comfort measures.  - EEG showed moderate degree of diffuse cerebral dysfunction without any seizures a few days ago.  -  Patient's prognosis is extremely poor and I have recommended terminal weaning.  Family is currently agreeable for comfort measures but did not want to make a decision regarding extubation.  - Repeat CT of the head shows worsening of cerebral edema.  - Continue morphine, Robinul and Ativan for comfort measures.    Cardiac arrest with prolonged CPR.  - Patient likely has suffered some amount of anoxic neurological injury and has had seizures and fevers which is not a good prognostic indicator.  - Repeat CT of the head done on 9/21/2024 showed worsening of anoxic brain injury.  There is significant swelling in the cerebellum and she is at high risk for herniation.  - Exact etiology of cardiac arrest is uncertain but likely a respiratory event with aspiration pneumonitis, hypoxia leading to cardiac arrest.    Respiratory failure/aspiration pneumonia.  - Completed a course of antibiotics therapy.    Acute on chronic anemia.  - Status post 1 unit of blood transfusion.    Nutrition.  - Patient does have significant post residual volumes and we will discontinue tube feeds.  - Family is agreeable to discontinue NG tube.    I have discussed the patient's condition and treatment plan with her daughters and son who are at the bedside.  They are agreeable for comfort measures but somehow hesitant to make a decision to do compassionate extubation.    Discussed with nursing staff at the bedside.    I have reviewed the copied text and it is accurate as of 09/30/24    DVT Prophylaxis: Comfort measures.  Code Status: Comfort measures  Diet: N.p.o.  Discharge Plan: Pending.    Sharath Monae MD  09/30/24  15:21 EDT    Dictated utilizing Dragon dictation.

## 2024-09-30 NOTE — CASE MANAGEMENT/SOCIAL WORK
DCP; Pending course of recovery. Pt remains intubated. Pt is currently comfort care with Palliative services involved. No immediate needs. CM continues to follow.

## 2024-09-30 NOTE — PLAN OF CARE
Problem: Skin Injury Risk Increased  Goal: Skin Health and Integrity  Intervention: Optimize Skin Protection  Recent Flowsheet Documentation  Taken 9/30/2024 0647 by Fernando Ackerman RRT  Head of Bed (HOB) Positioning: HOB elevated  Skin Protection: tubing/devices free from skin contact     Problem: Adult Inpatient Plan of Care  Goal: Absence of Hospital-Acquired Illness or Injury  Intervention: Prevent Skin Injury  Recent Flowsheet Documentation  Taken 9/30/2024 0647 by Fernando Ackerman, RRT  Skin Protection: tubing/devices free from skin contact     Problem: Skin and Tissue Injury (Mechanical Ventilation, Invasive)  Goal: Absence of Device-Related Skin and Tissue Injury  Intervention: Maintain Skin and Tissue Health  Recent Flowsheet Documentation  Taken 9/30/2024 0647 by Fernando Ackerman RRT  Device Skin Pressure Protection: positioning supports utilized     Problem: Ventilator-Induced Lung Injury (Mechanical Ventilation, Invasive)  Goal: Absence of Ventilator-Induced Lung Injury  Outcome: Progressing  Intervention: Prevent Ventilator-Associated Pneumonia  Recent Flowsheet Documentation  Taken 9/30/2024 0647 by Fernando Ackerman RRT  Head of Bed (HOB) Positioning: HOB elevated  VAP Prevention Bundle:   HOB elevation maintained   vent circuit breaks minimized   readiness to extubate assessed  Intervention: Facilitate Lung-Protection Measures  Recent Flowsheet Documentation  Taken 9/30/2024 0647 by Fernando Ackerman RRT  Lung Protection Measures:   low tidal volume provided   low inspiratory pressure provided   lung compliance monitored   plateau/inspiratory pressure monitored   ventilator synchrony promoted   ventilator waveforms monitored   Goal Outcome Evaluation:

## 2024-09-30 NOTE — PLAN OF CARE
Problem: Communication Impairment (Mechanical Ventilation, Invasive)  Goal: Effective Communication  Outcome: Progressing     Problem: Device-Related Complication Risk (Mechanical Ventilation, Invasive)  Goal: Optimal Device Function  Outcome: Progressing     Problem: Inability to Wean (Mechanical Ventilation, Invasive)  Goal: Mechanical Ventilation Liberation  Outcome: Progressing     Problem: Ventilator-Induced Lung Injury (Mechanical Ventilation, Invasive)  Goal: Absence of Ventilator-Induced Lung Injury  Outcome: Progressing   Goal Outcome Evaluation:

## 2024-10-01 NOTE — PLAN OF CARE
Goal Outcome Evaluation:  Plan of Care Reviewed With: daughter        Progress: no change  Outcome Evaluation: Patient is unresponsive on ventilator. No sedation administered this entire shift. Daughter has remained at bedside. VSS. Sinus rhythm to sinus tachycardia on monitor. Daughter expresses gratitude and being pleased with her mothers care. No complaints at this time.

## 2024-10-01 NOTE — PLAN OF CARE
Problem: Device-Related Complication Risk (Mechanical Ventilation, Invasive)  Goal: Optimal Device Function  Outcome: Progressing     Problem: Inability to Wean (Mechanical Ventilation, Invasive)  Goal: Mechanical Ventilation Liberation  Outcome: Progressing     Problem: Skin and Tissue Injury (Mechanical Ventilation, Invasive)  Goal: Absence of Device-Related Skin and Tissue Injury  Outcome: Progressing     Problem: Noninvasive Ventilation Acute  Goal: Effective Unassisted Ventilation and Oxygenation  Outcome: Progressing   Goal Outcome Evaluation:

## 2024-10-01 NOTE — CASE MANAGEMENT/SOCIAL WORK
DCP; Pending course of recovery. She remains intubated with poor prognosis. No immediate needs. CM continues to follow.

## 2024-10-01 NOTE — PROGRESS NOTES
[facility]    Name:  Annabel Hyman   Age:  84 y.o.  Sex:  female  :  1940  MRN:  [id number]   Visit Number:  [id number]  Admission Date:  2024  Date Of Service:  10/01/24  Primary Care Physician:  Provider, No Known     LOS: 7 days :    Chief Complaint:      Follow-up of cardiac arrest and suspected anoxic encephalopathy.    Subjective:    Patient examined with multiple family members and palliative care team at bedside. Waiting for other family to arrive before proceeding with terminal extubation.     Hospital Course:    Annabel Hyman is an 84-year-old female, chronically wheelchair-bound, history of CVA, hypertension, Alzheimer dementia was brought to the emergency room by EMS after cardiac arrest at home.  Patient apparently was sitting up and eating a cake while a strawberry got stuck in her throat.  She subsequently became unresponsive and turned blue.  Family started CPR and apparently took about 15 minutes for EMS arrival who continued the CPR with the Matthew device.  Family states that the patient did not turn cyanotic prior to EMS arrival.  Patient was transported to the ER with ongoing CPR and after she was in the ER and another dose of epinephrine, ROSC was obtained.  Patient was intubated and a left IJ central line was placed.  She was initiated on Levophed and was admitted to the ICU.    Initial vitals: Temperature 99.2, pulse 121, blood pressure 138/56 with subsequent drop to 83/49.  Pulse oxygen saturation 89% on arrival.  Initial ABG pH less than 6.7, pCO2 71, p.o. 213, bicarb 9.6 on 100% Ambu bag.  CMP showed a potassium of 5.3, BUN 28, creatinine 1.46 (unknown baseline), glucose 240, , .  Lactic acid was 14.  C-reactive protein 0.45.  INR 1.32.  WBC 12.3, hemoglobin 9.6.  Chest x-ray showed likely scarring or atelectasis of the left lung base.  Noncontrast CT of the head showed no acute intracranial process.  CT  angiogram of the chest was negative for pulmonary embolism or aortic aneurysm.  Showed acute fractures of the left 5-7th anterior ribs and right anterior sixth and seventh ribs.  No pneumothorax.  T3 and T12 compression fractures of indeterminate age noted.  CT of the abdomen and pelvis with contrast showed very large amount of stool in the rectum with mild rectal wall thickening.  Stomach is distended with fluid.  Gallbladder not identified.  Intra and extrahepatic biliary dilatation without choledocholithiasis noted.  L4 and L5 compression fractures of indeterminate age.  Patient was given IV Zosyn and was placed on Precedex, Levophed and was subsequently admitted to the medical ICU.    Patient was seen by Dr. Castro from pulmonology for ventilatory management.  She was also seen by Dr. Kiran for tonic-clonic seizures while on mechanical ventilation.  He recommended Keppra and fosphenytoin.  EEG was ordered.  Patient's blood pressure improved and Levophed was discontinued.  Due to high suspicion of anoxic encephalopathy, palliative care services were consulted.    Patient was continued on Zosyn for aspiration pneumonia.  She continued to have fevers despite being on antibiotics therapy and started dropping her blood pressures.  She was initiated on vancomycin as well as Levophed.  Repeat CT scan of the head without contrast done on 9/27/2024 unfortunately showed evidence of edema, more pronounced in the cerebellum and cerebrum compatible with anoxic brain injury.  After discussion with family and palliative care services, patient's CODE STATUS was changed to DNR.  Patient continued to be unresponsive and eventually developed pinpoint pupils that were not reactive likely from posterior brain edema and herniation.  After discussion with family, patient's CODE STATUS was changed to comfort measures.      Review of Systems:     All systems were reviewed and negative except as mentioned in subjective, assessment and  plan.    Vital Signs:    Temp:  [97.4 °F (36.3 °C)-98.2 °F (36.8 °C)] 98.2 °F (36.8 °C)  Heart Rate:  [] 170  Resp:  [16-34] 34  BP: (151-193)/(70-93) 151/74  FiO2 (%):  [30 %] 30 %    Intake and output:    I/O last 3 completed shifts:  In: -   Out: 2745 [Urine:2745]  No intake/output data recorded.    Physical Examination: Examined again 10/1/24    General Appearance:  Unresponsive on mechanical ventilation.     Head:  Atraumatic and normocephalic.   Eyes: Conjunctivae and sclerae normal, no icterus.  Looks pale.  Pupils bilaterally pinpoint and nonreactive.  Left eye is deviated medially and inferiorly.   Throat: No oral lesions, no thrush, oral mucosa moist.  Endotracheal tube is in place.  NG tube is in place.   Neck: Supple, trachea midline, no thyromegaly.  Left internal jugular central venous line noted.   Lungs:   Breath sounds heard bilaterally equally.  No wheezing or crackles. No Pleural rub or bronchial breathing.   Heart:  Normal S1 and S2, no murmur, no gallop, no rub. No JVD.   Abdomen:   Normal bowel sounds, no masses, no organomegaly. Soft, nontender, nondistended, no rebound tenderness.  Butler catheter is in place.   Extremities: Supple, no edema, no cyanosis, no clubbing.  Poor muscle mass.   Skin: No bleeding or rash.   Neurologic: Unresponsive.  Currently on mechanical ventilation.    Laboratory results:    Results from last 7 days   Lab Units 09/29/24  0401 09/28/24  0422 09/27/24  0716 09/26/24  0506   SODIUM mmol/L 129* 130* 133* 145   POTASSIUM mmol/L 3.5 3.4* 3.3* 3.1*   CHLORIDE mmol/L 98 101 98 103   CO2 mmol/L 20.5* 22.4 24.1 30.3*   BUN mg/dL 12 16 23 30*   CREATININE mg/dL 0.80 0.97 1.16* 1.33*   CALCIUM mg/dL 7.3* 7.4* 7.7* 8.0*   BILIRUBIN mg/dL  --  0.2 0.3 0.4   ALK PHOS U/L  --  86 115 155*   ALT (SGPT) U/L  --  71* 94* 150*   AST (SGOT) U/L  --  132* 179* 202*   GLUCOSE mg/dL 182* 190* 158* 155*     Results from last 7 days   Lab Units 09/29/24  0401 09/28/24  0421  09/27/24  0716   WBC 10*3/mm3 6.28 6.31 10.82*   HEMOGLOBIN g/dL 8.7* 6.8* 7.2*   HEMATOCRIT % 26.9* 21.5* 23.0*   PLATELETS 10*3/mm3 169 148 176     Results from last 7 days   Lab Units 09/24/24  1828   INR  1.32*     Results from last 7 days   Lab Units 09/24/24  2040 09/24/24  1852   BLOODCX   --  No growth at 5 days  No growth at 5 days   URINECX  >100,000 CFU/mL Klebsiella pneumoniae ssp pneumoniae*  --      Results from last 7 days   Lab Units 09/28/24  0746   PH, ARTERIAL pH units 7.432   PO2 ART mm Hg 109.0*   PCO2, ARTERIAL mm Hg 36.8   HCO3 ART mmol/L 24.5     I have reviewed the patient's laboratory results.    Radiology results:    No radiology results from the last 24 hrs  I have reviewed the patient's radiology reports.    Medication Review:     I have reviewed the patient's active and prn medications.     Problem List:      Cardiac arrest    ANDERS (acute kidney injury)    Acute respiratory failure with hypoxia and hypercapnia    Ischemic hepatitis    Stercoral colitis    Aspiration pneumonitis    Metabolic acidosis    Assessment:    Cardiac arrest at home with prolonged CPR, POA.  Severe anoxic encephalopathy with seizures, POA.  Acute hypoxic and hypercapnic respiratory failure likely secondary to #2.  Aspiration pneumonitis, POA.  Acute renal failure, POA.  Septic shock, not POA.  Acute ischemic hepatitis (shock liver), POA.  Constipation with stercoral colitis noted on CT scan.  Severe metabolic acidosis and lactic acidosis secondary to #1, POA.  Hypernatremia, POA.  History of stroke with functional quadriplegia.    Plan:    Anoxic encephalopathy.  - Patient does have severe anoxic encephalopathy with edema of the cerebellum and high risk for herniation.  She is already developing paralytic strabismus.  - Patient is currently having pinpoint pupils likely from worsening brain edema and possible herniation.  - Since the family has decided to make her comfort care we will discontinue all medications  and keep her only on comfort measures.  - EEG showed moderate degree of diffuse cerebral dysfunction without any seizures a few days ago.  - Patient's prognosis is extremely poor and I have recommended terminal weaning.  Family is currently agreeable for comfort measures but did not want to make a decision regarding extubation.  - Repeat CT of the head shows worsening of cerebral edema.  - Continue morphine, Robinul and Ativan for comfort measures.    Cardiac arrest with prolonged CPR.  - Patient likely has suffered some amount of anoxic neurological injury and has had seizures and fevers which is not a good prognostic indicator.  - Repeat CT of the head done on 9/21/2024 showed worsening of anoxic brain injury.  There is significant swelling in the cerebellum and she is at high risk for herniation.  - Exact etiology of cardiac arrest is uncertain but likely a respiratory event with aspiration pneumonitis, hypoxia leading to cardiac arrest.    Respiratory failure/aspiration pneumonia.  - Completed a course of antibiotics therapy.    Acute on chronic anemia.  - Status post 1 unit of blood transfusion.    Nutrition.  - Patient does have significant post residual volumes and we will discontinue tube feeds.  - Family is agreeable to discontinue NG tube.    I have discussed the patient's condition and treatment plan with her daughters and son who are at the bedside.  They are agreeable for comfort measures but somehow hesitant to make a decision to do compassionate extubation.    Discussed with nursing staff at the bedside.    I have reviewed the copied text and it is accurate as of 10/01/24    Patient's family at bedside. Plan to do terminal extubation this evening. Will sign out to night hospitalist.     DVT Prophylaxis: Comfort measures.  Code Status: Comfort measures  Diet: N.p.o.  Discharge Plan: Terminal Extubation     Pedro Altamirano DO  10/01/24  17:58 EDT    Dictated utilizing Dragon dictation.

## 2024-10-01 NOTE — PAYOR COMM NOTE
"To:  [facility]  From: Gwendolyn Limon RN  Phone: [phone]  Fax: [phone]  NPI: [id number]  TIN: [id number]  Member ID: [id number]   MRN: [id number]    Flores Hyman (84 y.o. Female)       Date of Birth   1940    Social Security Number       Address   [address] [place] [address]    Home Phone   [phone]    MRN   [id number]       Jewish   Yarsani    Marital Status                               Admission Date   24    Admission Type   Emergency    Admitting Provider   Hugo Mccormick DO    Attending Provider   Pedro Altamirano DO    Department, Room/Bed   [facility] INTENSIVE CARE, I03       Discharge Date       Discharge Disposition       Discharge Destination                                 Attending Provider: Pedro Altamirano DO    Allergies: No Known Allergies    Isolation: None   Infection: None   Code Status: No CPR    Ht: 139.7 cm (55\")   Wt: 51 kg (112 lb 7 oz)    Admission Cmt: None   Principal Problem: Cardiac arrest [I46.9]                   Active Insurance as of 2024       Primary Coverage       Payor Plan Insurance Group Employer/Plan Group    [facility] MEDICAID        Payor Plan Address Payor Plan Phone Number Payor Plan Fax Number Effective Dates    [address] [address] [phone]  2024 - None Entered    [facility] [id number]         Subscriber Name Subscriber Birth Date Member ID       FLORES HYMAN 1940 [id number]                     Emergency Contacts        (Rel.) Home Phone Work Phone Mobile Phone    HARRY ASHBY (Daughter) [phone] -- [phone]    Javier Hyman (Relative) -- -- [phone]    Kanwal Hyman (Daughter) -- -- [phone]                 History & Physical        Hugo Mccormick DO at 24 0115            [facility] HOSPITALIST   HISTORY AND PHYSICAL      Name:  Flores Hyman   Age:  84 y.o.  Sex:  female  :  1940  MRN:  [id number] "   Visit Number:  [id number]  Admission Date:  9/24/2024  Date Of Service:  09/25/24  Primary Care Physician:  Provider, No Known    Chief Complaint:     Cardiac arrest    History Of Presenting Illness:      84 female history provided by daughter and son. PMHx stroke, hypertension, Alzheimer's dementia, not walking for 2 years. Can move in a wheel chair if pushed by someone. Lives with another son who is not here. [place]. Was visiting today. Ate a strawberry got stuck in the throat. Leading to cardiac arrest. Was intubated and the strawberry was removed in the field. Had CPR initiated in the field.  Was continued on Matthew device when arrived to the ED received 1 epinephrine in the ED with ROSC.  Since admission she has been noted to have diarrhea and reduced rectal tone. Full code but would only say one more time to the CPR.    Pertinent findings: pH 7.362, lactic acid 14 to 8, UA mild bacteriuria, hematuria, CT abdomen shows stool in abdomen concern for stercoral colitis, CT chest rib fractures, right upper arm possible injury, CT head ok. Creatinine 1.46,  , WBC 12.31, Blood cultures pending    ED Medications:    Medications   sodium chloride 0.9 % flush 10 mL (has no administration in time range)   dexmedetomidine (PRECEDEX) 400 mcg in 100 mL NS infusion (0.5 mcg/kg/hr × 49 kg Intravenous Currently Infusing 9/24/24 2112)   sodium bicarbonate 8.4 % 150 mEq in sodium chloride 0.45 % 1,000 mL infusion (greater than 100 mEq) ( Intravenous Currently Infusing 9/24/24 2108)   norepinephrine (LEVOPHED) 8 mg in 250mL D5W infusion (0.04 mcg/kg/min × 49 kg Intravenous Rate/Dose Change 9/24/24 2106)   midazolam (VERSED) 100 mg in 100mL NS infusion (2 mg/hr Intravenous Currently Infusing 9/24/24 2110)   Midazolam HCl (PF) (VERSED) injection 2 mg (2 mg Intravenous Given 9/24/24 1814)   EPINEPHrine (ADRENALIN) injection (1 mg Intravenous Given 9/24/24 1748)   sodium bicarbonate injection 8.4% (50  "mEq Intravenous Given 9/24/24 1812)   calcium chloride injection (1 g Intravenous Given 9/24/24 1806)   magnesium sulfate injection (2 g Intravenous Given 9/24/24 1807)   Midazolam HCl (PF) (VERSED) injection (2 mg Intravenous Given 9/24/24 1812)   iopamidol (ISOVUE-300) 61 % injection 100 mL (100 mL Intravenous Given 9/24/24 2033)   Midazolam HCl (PF) (VERSED) injection 2 mg (2 mg Intravenous Given 9/24/24 1950)       Edited by: Hugo Mccormick DO at 9/25/2024 0115     Review Of Systems:    All systems were reviewed and negative except as mentioned in history of presenting illness, assessment and plan.    Past Medical History: Patient  has a past medical history of Dysphagia, Hypertension, and Stroke (2013).    Past Surgical History: Patient  has no past surgical history on file.    Social History: Patient  reports that she has an unknown smoking status. She has never used smokeless tobacco. She reports that she does not drink alcohol and does not use drugs.    Family History:  Patient's family history has been reviewed and found to be noncontributory.     Allergies:      Patient has no known allergies.    Home Medications:    Prior to Admission Medications       None              Vital Signs:  Temp:  [99.2 °F (37.3 °C)] 99.2 °F (37.3 °C)  Heart Rate:  [] 58  Resp:  [23-24] 23  BP: ()/(48-79) 132/67  FiO2 (%):  [50 %-100 %] 50 %        09/24/24 1808   Weight: 49 kg (108 lb)     Body mass index is 20.41 kg/m².    Physical Exam:     Most recent vital Signs: /67   Pulse 58   Temp 99.2 °F (37.3 °C) (Rectal)   Resp 23   Ht 154.9 cm (61\")   Wt 49 kg (108 lb)   SpO2 100%   BMI 20.41 kg/m²     Constitutional: sedated on vent, endotracheal tube in place.  Eyes: PERRLA, sclerae anicteric, no conjunctival injection  HENT: NCAT, mucous membranes moist  Neck: Supple, no thyromegaly, no lymphadenopathy, trachea midline  Respiratory: Clear to auscultation bilaterally, nonlabored respirations "   Cardiovascular: RRR, no murmurs, rubs, or gallops, palpable pedal pulses bilaterally  Gastrointestinal: Positive bowel sounds, soft, nontender, nondistended  Musculoskeletal: No bilateral ankle edema, no clubbing or cyanosis to extremities  Psychiatric: sedated on vent  Neurologic: sedated on vent  Skin: No rashes  Edited by: Hugo Mccormick DO at 9/24/2024 6762      Laboratory data:    I have reviewed the labs done in the emergency room.    Results from last 7 days   Lab Units 09/24/24  1828   SODIUM mmol/L 143   POTASSIUM mmol/L 5.3*   CHLORIDE mmol/L 104   CO2 mmol/L 13.8*   BUN mg/dL 28*   CREATININE mg/dL 1.46*   CALCIUM mg/dL 9.6   BILIRUBIN mg/dL 0.3   ALK PHOS U/L 135*   ALT (SGPT) U/L 152*   AST (SGOT) U/L 197*   GLUCOSE mg/dL 240*     Results from last 7 days   Lab Units 09/24/24  1828   WBC 10*3/mm3 12.31*   HEMOGLOBIN g/dL 9.6*   HEMATOCRIT % 33.3*   PLATELETS 10*3/mm3 250     Results from last 7 days   Lab Units 09/24/24  1828   INR  1.32*                     Results from last 7 days   Lab Units 09/24/24  1949   PH, ARTERIAL pH units 7.362   PO2 ART mm Hg 452.0*   PCO2, ARTERIAL mm Hg 32.8*   HCO3 ART mmol/L 18.6*     Results from last 7 days   Lab Units 09/24/24  2040   COLOR UA  Yellow   GLUCOSE UA  Negative   KETONES UA  Negative   BLOOD UA  Moderate (2+)*   LEUKOCYTES UA  Small (1+)*   PH, URINE  6.0   BILIRUBIN UA  Negative   UROBILINOGEN UA  0.2 E.U./dL   RBC UA /HPF 11-20*   WBC UA /HPF 6-10*       Pain Management Panel           No data to display                EKG:      Atrial fibrillation    Radiology:    CT Abdomen Pelvis With Contrast    Result Date: 9/24/2024  FINAL REPORT TECHNIQUE: null CLINICAL HISTORY: cardiac arrest COMPARISON: null FINDINGS: Exam: CT Abdomen and Pelvis with contrast Comparison: None Clinical history: Cardiac arrest Findings: NG tube tip in the stomach. Fluid in the distal esophagus may be reflective of gastroesophageal reflux. Stomach is distended with fluid.  Mild bibasilar atelectasis. Liver does not demonstrate any focal lesions. Gallbladder not identified and is likely surgically absent. Enlargement of the Intrahepatic biliary ducts, common hepatic duct, common bile duct and pancreatic duct. No choledocholithiasis identified. Findings may be due to pancreatic head lesion, ampullary stricture/lesion, sphincter dysfunction or post cholecystectomy status. No pancreatic head lesion identified on today's exam. Spleen has a normal appearance. No acute pancreatitis. Normal adrenal glands. No renal stones. Symmetric enhancement of the kidneys bilaterally. No CT evidence for pyelonephritis. No abdominal aortic aneurysm or dissection. Atherosclerotic vascular disease noted. . No small bowel obstruction or ileus. The cecum ascending colon and transverse colon are distended with gas, fluid and stool. Very large amount of stool in the rectum with rectum distended to 8.7 x 9 x 16.4 cm. Mild rectal wall thickening with perirectal inflammation reflective of a stercoral colitis. The urinary bladder and uterus are deviated ventrally secondary to the very large amount of stool in the rectum. Urinary bladder is decompressed with a Butler catheter.. Trace amount of free fluid in the deep pelvis. L4 compression fracture with 1/3 loss of vertebral body height. Mild L5 compression fracture with 25% loss of vertebral body height. Hemangioma at L2. Bilateral L5 pars defects with grade 1 anterior subluxation. Degenerative disc and endplate disease at L4-5 and L5-S1. Mild bilateral hip arthritis.     Impression: 1. Very large amount of stool in the rectum with rectum distended to 8.7 x 9 x 16.4 cm. Mild rectal wall thickening with perirectal inflammation reflective of a stercoral colitis. The cecum ascending colon and transverse colon are distended with gas, fluid and stool. Moderate stool in the left colon. 2. NG tube tip in the stomach. Fluid in the distal esophagus may be reflective of  gastroesophageal reflux. Stomach is distended with fluid. 3. Gallbladder not identified and is likely surgically absent. 4. Intra and extrahepatic biliary dilatation with no choledocholithiasis identified. Findings may be due to pancreatic head lesion, ampullary stricture/lesion, sphincter dysfunction or post cholecystectomy status. No pancreatic head lesion identified on today's exam. MR/MRCP could be considered for further evaluation. 5. Urinary bladder is decompressed with a Butler catheter.. 6. L4 and L5 compression fractures of indeterminate age. Please correlate with physical exam. Bilateral L5 pars defects. Degenerative disease of the lumbar spine. Authenticated and Electronically Signed by Sarah Peralta MD on 09/24/2024 09:29:37 PM    CT Angiogram Chest    Result Date: 9/24/2024  FINAL REPORT TECHNIQUE: null CLINICAL HISTORY: respiratory arrest COMPARISON: null FINDINGS: Exam: CTA Chest with IV contrast. Procedure: Coronal and sagittal MIP reformats were performed Comparison: None Clinical history: Cardiac arrest Findings: No pulmonary emboli. No thoracic aortic aneurysm or dissection. No hilar or mediastinal adenopathy. No pericardial fluid collection. No pleural fluid collection. Acute fractures of the left 5, 6 and 7 anterior ribs. Acute fractures of the right anterior 5 and 6 ribs. No pneumothorax. Mild bibasilar atelectasis. The right upper arm is only partially visualized. Low-density ill-defined fluid and air seen in the soft tissues of the right upper arm. T3 compression fracture with 10% loss of vertebral body height. Age of the fracture is indeterminate. T12 compression fracture with 25% loss of vertebral body height. Age of the fracture is indeterminate.     Impression: 1. No pulmonary emboli. 2. No thoracic aortic aneurysm or dissection 3. Acute fractures of the left 5th-7th anterior ribs and right anterior 6th and 7th ribs. No pneumothorax. Mild bibasilar atelectasis. 4. The right upper arm is  only partially visualized. Low-density ill-defined fluid and air seen in the soft tissues of the right upper arm. Please correlate clinically. 5. T3 and T12 compression fractures of indeterminate age. Please correlate with physical exam. MRI could be considered for further evaluation. Authenticated and Electronically Signed by Sarah Peralta MD on 09/24/2024 09:21:12 PM    CT Head Without Contrast    Result Date: 9/24/2024  FINAL REPORT TECHNIQUE: null CLINICAL HISTORY: post code COMPARISON: null FINDINGS: CT HEAD WITHOUT CONTRAST Comparison: None Findings: There is motion artifact. No acute intracranial hemorrhage, extra-axial fluid collection, hydrocephalus or midline shift. Age appropriate generalized parenchymal atrophy. There are periventricular and subcortical white matter hypodensities which are nonspecific but most likely related to microangiopathic gliosis. Intracranial arteriosclerosis. There is no sinus or mastoid fluid. Visualized orbits: No acute abnormalities. There is no acute fracture.     IMPRESSION: 1. No acute intracranial process. Authenticated and Electronically Signed by Becki Franklin DO on 09/24/2024 09:01:33 PM    XR Chest 1 View    Result Date: 9/24/2024  FINAL REPORT TECHNIQUE: null CLINICAL HISTORY: Severe Sepsis triage protocol ET and central line placement COMPARISON: null FINDINGS: 1 view chest x-ray Comparison: None Findings: Endotracheal tube distal tip is positioned 2.5 cm superior to the claude. A left central line crosses midline with distal tip terminating at the level of the right atrium. A percutaneous pacer overlies the superior lateral right hemithorax. Linear scarring or atelectasis involving the left lung base. A large granuloma of the periphery of the mid left lung measures 1.4 cm. No focal consolidation or large pleural effusion. No pneumothorax. The heart size is normal. Retrocardiac atelectasis. No acute fractures. The stomach lumen is gas-filled and dilated.      IMPRESSION: 1. Endotracheal tube terminates 2.5 cm superior to the claude. Left central line crosses midline with distal tip overlying the expected location of the right atrium. 2. Likely scarring or atelectasis of the left lung base including the retrocardiac region without acute process. Authenticated and Electronically Signed by Anthony Egan DO on 2024 07:13:52 PM     Assessment/Plan:      Cardiac arrest    ANDERS (acute kidney injury)    Transaminitis    Bacterial colitis      -- cardiac arrest due to airway obstruction resolved. Concern for stercoral colitis now with diarrhea.  Intubated, G-tube, FMS, Butler 2024.  -- Active Treatments: zosyn, vent management, sedation  -- Pending Results: Neurology, pulmonology, Palliative, am labs      DVT Prophylaxis: lovenox  Code Status: Full  Diet:  NPO  Risk assessment: high anticipate greater than 2 days, poor prognosis expressed to family          Edited by: Hugo Mccormick DO at 2024 1525       Advance Care Planning  ACP discussion was held with the patient during this visit. Patient does not have an advance directive, declines further assistance.           Hugo Mccormick DO  24  01:15 EDT    Dictated utilizing Dragon dictation.      Electronically signed by Hugo Mccormick DO at 24 0118          Emergency Department Notes        Jeffrey Yan MD at 24        Procedure Orders    1. Critical Care [[id number]] ordered by Jeffrey Yan MD    2. Central Line At Bedside [[id number]] ordered by Jeffrey Yan MD    3. Central Line At Bedside [[id number]] ordered by Jeffrey Yan MD                  EMERGENCY DEPARTMENT ENCOUNTER    Pt Name: Annabel Hyman  MRN: [id number]  Pt :   1940  Room Number:    Date of encounter:  2024  PCP: Provider, No Known  ED Provider: Jeffrey Yan MD    Historian: Patient      HPI:  Chief Complaint   Patient presents  with    Cardiac Arrest          Context: Annabel Hyman is a 84 y.o. female who presents to the ED c/o cardiac arrest, the patient was apparently eating dinner, choked on a strawberry and lost pulses.  EMS arrived, intubated the patient, started ACLS and transfer the patient hospital, on arrival the patient remains pulseless, with a ET tube in place and Matthew providing chest compressions.  Patient and providing history.  Quick downtime on arrival, 1 epi so far.      PAST MEDICAL HISTORY  No past medical history on file.      PAST SURGICAL HISTORY  No past surgical history on file.      FAMILY HISTORY  No family history on file.      SOCIAL HISTORY  Social History     Socioeconomic History    Marital status:          ALLERGIES  Patient has no known allergies.        REVIEW OF SYSTEMS  Review of Systems   Unable to perform ROS: Patient unresponsive        All systems reviewed and negative except for those discussed in HPI.       PHYSICAL EXAM    I have reviewed the triage vital signs and nursing notes.    ED Triage Vitals   Temp Heart Rate Resp BP SpO2   09/24/24 1755 09/24/24 1755 09/24/24 1909 09/24/24 1755 09/24/24 1759   99.2 °F (37.3 °C) (!) 121 24 138/56 (!) 89 %      Temp src Heart Rate Source Patient Position BP Location FiO2 (%)   09/24/24 1755 09/24/24 1755 09/24/24 1755 09/24/24 1755 09/24/24 1805   Rectal Monitor Lying Left arm 100 %       Physical Exam  Constitutional:       Comments: Unresponsive elderly female, being bagged, with Matthew in place   HENT:      Head: Normocephalic and atraumatic.      Right Ear: External ear normal.      Left Ear: External ear normal.      Nose: Nose normal.      Mouth/Throat:      Mouth: Mucous membranes are moist.      Pharynx: Oropharynx is clear.   Eyes:      Comments: Pupils are fixed and dilated bilaterally   Cardiovascular:      Comments: No heart sounds present, no pulses  Pulmonary:      Comments: Transmitted ventilatory sounds with bagging  Abdominal:       General: Abdomen is flat.      Palpations: Abdomen is soft.   Musculoskeletal:      Cervical back: Normal range of motion and neck supple.      Comments: No acute deformity   Skin:     General: Skin is warm.      Comments: Mottled bilaterally   Neurological:      Comments: Patient is unresponsive, does not complete neurologic exam.            LAB RESULTS  Recent Results (from the past 24 hour(s))   Blood Gas, Arterial With Co-Ox    Collection Time: 09/24/24  5:59 PM    Specimen: Arterial Blood   Result Value Ref Range    Site IVC     Prasanth's Test N/A     pH, Arterial <6.767 (C) 7.300 - 7.500 pH units    pCO2, Arterial 70.5 (C) 35.0 - 45.0 mm Hg    pO2, Arterial 113.0 (H) 75.0 - 100.0 mm Hg    HCO3, Arterial 9.6 (L) 22.0 - 28.0 mmol/L    Base Excess, Arterial -25.3 (L) 0.0 - 2.0 mmol/L    O2 Saturation, Arterial 89.6 (L) 94.0 - 100.0 %    Hematocrit, Blood Gas 29.6 %    Oxyhemoglobin 88.2 (L) 94 - 99 %    Methemoglobin 1.20 0.00 - 1.50 %    Carboxyhemoglobin 0.3 0 - 2 %    A-a DO2 499.2 mmHg    Barometric Pressure for Blood Gas 729 mmHg    Modality Ambu     FIO2 100 %    Ventilator Mode NA     Notified Who MD     Notified By [id number]     Notified Time 09/24/2024 18:03     Collected by LESLYE     pH, Temp Corrected      pCO2, Temperature Corrected      pO2, Temperature Corrected     POC Glucose Once    Collection Time: 09/24/24  5:59 PM    Specimen: Blood   Result Value Ref Range    Glucose 243 (H) 70 - 130 mg/dL   Comprehensive Metabolic Panel    Collection Time: 09/24/24  6:28 PM    Specimen: Blood   Result Value Ref Range    Glucose 240 (H) 65 - 99 mg/dL    BUN 28 (H) 8 - 23 mg/dL    Creatinine 1.46 (H) 0.57 - 1.00 mg/dL    Sodium 143 136 - 145 mmol/L    Potassium 5.3 (H) 3.5 - 5.2 mmol/L    Chloride 104 98 - 107 mmol/L    CO2 13.8 (L) 22.0 - 29.0 mmol/L    Calcium 9.6 8.6 - 10.5 mg/dL    Total Protein 7.0 6.0 - 8.5 g/dL    Albumin 3.6 3.5 - 5.2 g/dL    ALT (SGPT) 152 (H) 1 - 33 U/L    AST (SGOT) 197 (H) 1 - 32 U/L     Alkaline Phosphatase 135 (H) 39 - 117 U/L    Total Bilirubin 0.3 0.0 - 1.2 mg/dL    Globulin 3.4 gm/dL    A/G Ratio 1.1 g/dL    BUN/Creatinine Ratio 19.2 7.0 - 25.0    Anion Gap 25.2 (H) 5.0 - 15.0 mmol/L    eGFR 34.9 (L) >60.0 mL/min/1.73   Protime-INR    Collection Time: 09/24/24  6:28 PM    Specimen: Blood   Result Value Ref Range    Protime 16.9 (H) 12.3 - 15.1 Seconds    INR 1.32 (H) 0.90 - 1.10   aPTT    Collection Time: 09/24/24  6:28 PM    Specimen: Blood   Result Value Ref Range    PTT 38.3 (H) 23.0 - 35.0 seconds   Magnesium    Collection Time: 09/24/24  6:28 PM    Specimen: Blood   Result Value Ref Range    Magnesium 2.2 1.6 - 2.4 mg/dL   Phosphorus    Collection Time: 09/24/24  6:28 PM    Specimen: Blood   Result Value Ref Range    Phosphorus 8.0 (H) 2.5 - 4.5 mg/dL   Lactic Acid, Plasma    Collection Time: 09/24/24  6:28 PM    Specimen: Blood   Result Value Ref Range    Lactate 14.1 (C) 0.5 - 2.0 mmol/L   C-reactive Protein    Collection Time: 09/24/24  6:28 PM    Specimen: Blood   Result Value Ref Range    C-Reactive Protein 0.45 0.00 - 0.50 mg/dL   CBC Auto Differential    Collection Time: 09/24/24  6:28 PM    Specimen: Blood   Result Value Ref Range    WBC 12.31 (H) 3.40 - 10.80 10*3/mm3    RBC 3.53 (L) 3.77 - 5.28 10*6/mm3    Hemoglobin 9.6 (L) 12.0 - 15.9 g/dL    Hematocrit 33.3 (L) 34.0 - 46.6 %    MCV 94.3 79.0 - 97.0 fL    MCH 27.2 26.6 - 33.0 pg    MCHC 28.8 (L) 31.5 - 35.7 g/dL    RDW 17.9 (H) 12.3 - 15.4 %    RDW-SD 62.4 (H) 37.0 - 54.0 fl    MPV 11.8 6.0 - 12.0 fL    Platelets 250 140 - 450 10*3/mm3   Scan Slide    Collection Time: 09/24/24  6:28 PM    Specimen: Blood   Result Value Ref Range    Scan Slide     Manual Differential    Collection Time: 09/24/24  6:28 PM    Specimen: Blood   Result Value Ref Range    Neutrophil % 33.0 (L) 42.7 - 76.0 %    Lymphocyte % 40.0 19.6 - 45.3 %    Monocyte % 6.0 5.0 - 12.0 %    Eosinophil % 5.0 0.3 - 6.2 %    Bands %  1.0 0.0 - 5.0 %    Atypical  Lymphocyte % 15.0 (H) 0.0 - 5.0 %    Neutrophils Absolute 4.19 1.70 - 7.00 10*3/mm3    Lymphocytes Absolute 6.77 (H) 0.70 - 3.10 10*3/mm3    Monocytes Absolute 0.74 0.10 - 0.90 10*3/mm3    Eosinophils Absolute 0.62 (H) 0.00 - 0.40 10*3/mm3    Acanthocytes Slight/1+ None Seen    Ventura Cells Mod/2+ None Seen    Hypochromia Slight/1+ None Seen    WBC Morphology Normal Normal    Platelet Estimate Adequate Normal   Green Top (Gel)    Collection Time: 09/24/24  6:29 PM   Result Value Ref Range    Extra Tube Hold for add-ons.    Lavender Top    Collection Time: 09/24/24  6:29 PM   Result Value Ref Range    Extra Tube hold for add-on    Gold Top - SST    Collection Time: 09/24/24  6:29 PM   Result Value Ref Range    Extra Tube Hold for add-ons.    Light Blue Top    Collection Time: 09/24/24  6:29 PM   Result Value Ref Range    Extra Tube Hold for add-ons.    Blood Gas, Arterial With Co-Ox    Collection Time: 09/24/24  7:49 PM    Specimen: Arterial Blood   Result Value Ref Range    Site Right Radial     Prasanth's Test Positive     pH, Arterial 7.362 7.300 - 7.500 pH units    pCO2, Arterial 32.8 (L) 35.0 - 45.0 mm Hg    pO2, Arterial 452.0 (H) 75.0 - 100.0 mm Hg    HCO3, Arterial 18.6 (L) 22.0 - 28.0 mmol/L    Base Excess, Arterial -6.1 (L) 0.0 - 2.0 mmol/L    O2 Saturation, Arterial 98.5 94.0 - 100.0 %    Hematocrit, Blood Gas 30.0 %    Oxyhemoglobin 98.1 94 - 99 %    Methemoglobin 0.80 0.00 - 1.50 %    Carboxyhemoglobin <0.0 (L) 0 - 2 %    A-a DO2 198.0 mmHg    Barometric Pressure for Blood Gas 730 mmHg    Modality Ventilator     FIO2 100 %    Ventilator Mode AC     Set Tidal Volume 320     Set Mech Resp Rate 20.0     PEEP 5.0     Collected by SJ     pH, Temp Corrected      pCO2, Temperature Corrected      pO2, Temperature Corrected     Urinalysis With Microscopic If Indicated (No Culture) - Urine, Catheter    Collection Time: 09/24/24  8:40 PM    Specimen: Urine, Catheter   Result Value Ref Range    Color, UA Yellow Yellow,  Straw    Appearance, UA Cloudy (A) Clear    pH, UA 6.0 5.0 - 8.0    Specific Gravity, UA 1.019 1.005 - 1.030    Glucose, UA Negative Negative    Ketones, UA Negative Negative    Bilirubin, UA Negative Negative    Blood, UA Moderate (2+) (A) Negative    Protein,  mg/dL (2+) (A) Negative    Leuk Esterase, UA Small (1+) (A) Negative    Nitrite, UA Negative Negative    Urobilinogen, UA 0.2 E.U./dL 0.2 - 1.0 E.U./dL   Urinalysis, Microscopic Only - Urine, Catheter    Collection Time: 09/24/24  8:40 PM    Specimen: Urine, Catheter   Result Value Ref Range    RBC, UA 11-20 (A) None Seen, 0-2 /HPF    WBC, UA 6-10 (A) None Seen, 0-2 /HPF    Bacteria, UA 1+ (A) None Seen /HPF    Squamous Epithelial Cells, UA 7-12 (A) None Seen, 0-2 /HPF    Hyaline Casts, UA None Seen None Seen /LPF    Amorphous Crystals, UA Small/1+ None Seen /HPF    Methodology Manual Light Microscopy        If labs were ordered, I independently reviewed the results and considered them in treating the patient.        RADIOLOGY  CT Abdomen Pelvis With Contrast    Result Date: 9/24/2024  FINAL REPORT TECHNIQUE: null CLINICAL HISTORY: cardiac arrest COMPARISON: null FINDINGS: Exam: CT Abdomen and Pelvis with contrast Comparison: None Clinical history: Cardiac arrest Findings: NG tube tip in the stomach. Fluid in the distal esophagus may be reflective of gastroesophageal reflux. Stomach is distended with fluid. Mild bibasilar atelectasis. Liver does not demonstrate any focal lesions. Gallbladder not identified and is likely surgically absent. Enlargement of the Intrahepatic biliary ducts, common hepatic duct, common bile duct and pancreatic duct. No choledocholithiasis identified. Findings may be due to pancreatic head lesion, ampullary stricture/lesion, sphincter dysfunction or post cholecystectomy status. No pancreatic head lesion identified on today's exam. Spleen has a normal appearance. No acute pancreatitis. Normal adrenal glands. No renal stones.  Symmetric enhancement of the kidneys bilaterally. No CT evidence for pyelonephritis. No abdominal aortic aneurysm or dissection. Atherosclerotic vascular disease noted. . No small bowel obstruction or ileus. The cecum ascending colon and transverse colon are distended with gas, fluid and stool. Very large amount of stool in the rectum with rectum distended to 8.7 x 9 x 16.4 cm. Mild rectal wall thickening with perirectal inflammation reflective of a stercoral colitis. The urinary bladder and uterus are deviated ventrally secondary to the very large amount of stool in the rectum. Urinary bladder is decompressed with a Butler catheter.. Trace amount of free fluid in the deep pelvis. L4 compression fracture with 1/3 loss of vertebral body height. Mild L5 compression fracture with 25% loss of vertebral body height. Hemangioma at L2. Bilateral L5 pars defects with grade 1 anterior subluxation. Degenerative disc and endplate disease at L4-5 and L5-S1. Mild bilateral hip arthritis.     Impression: 1. Very large amount of stool in the rectum with rectum distended to 8.7 x 9 x 16.4 cm. Mild rectal wall thickening with perirectal inflammation reflective of a stercoral colitis. The cecum ascending colon and transverse colon are distended with gas, fluid and stool. Moderate stool in the left colon. 2. NG tube tip in the stomach. Fluid in the distal esophagus may be reflective of gastroesophageal reflux. Stomach is distended with fluid. 3. Gallbladder not identified and is likely surgically absent. 4. Intra and extrahepatic biliary dilatation with no choledocholithiasis identified. Findings may be due to pancreatic head lesion, ampullary stricture/lesion, sphincter dysfunction or post cholecystectomy status. No pancreatic head lesion identified on today's exam. MR/MRCP could be considered for further evaluation. 5. Urinary bladder is decompressed with a Butler catheter.. 6. L4 and L5 compression fractures of indeterminate age.  Please correlate with physical exam. Bilateral L5 pars defects. Degenerative disease of the lumbar spine. Authenticated and Electronically Signed by Sarah Peralta MD on 09/24/2024 09:29:37 PM    CT Angiogram Chest    Result Date: 9/24/2024  FINAL REPORT TECHNIQUE: null CLINICAL HISTORY: respiratory arrest COMPARISON: null FINDINGS: Exam: CTA Chest with IV contrast. Procedure: Coronal and sagittal MIP reformats were performed Comparison: None Clinical history: Cardiac arrest Findings: No pulmonary emboli. No thoracic aortic aneurysm or dissection. No hilar or mediastinal adenopathy. No pericardial fluid collection. No pleural fluid collection. Acute fractures of the left 5, 6 and 7 anterior ribs. Acute fractures of the right anterior 5 and 6 ribs. No pneumothorax. Mild bibasilar atelectasis. The right upper arm is only partially visualized. Low-density ill-defined fluid and air seen in the soft tissues of the right upper arm. T3 compression fracture with 10% loss of vertebral body height. Age of the fracture is indeterminate. T12 compression fracture with 25% loss of vertebral body height. Age of the fracture is indeterminate.     Impression: 1. No pulmonary emboli. 2. No thoracic aortic aneurysm or dissection 3. Acute fractures of the left 5th-7th anterior ribs and right anterior 6th and 7th ribs. No pneumothorax. Mild bibasilar atelectasis. 4. The right upper arm is only partially visualized. Low-density ill-defined fluid and air seen in the soft tissues of the right upper arm. Please correlate clinically. 5. T3 and T12 compression fractures of indeterminate age. Please correlate with physical exam. MRI could be considered for further evaluation. Authenticated and Electronically Signed by Sarah Peralta MD on 09/24/2024 09:21:12 PM    CT Head Without Contrast    Result Date: 9/24/2024  FINAL REPORT TECHNIQUE: null CLINICAL HISTORY: post code COMPARISON: null FINDINGS: CT HEAD WITHOUT CONTRAST Comparison: None  Findings: There is motion artifact. No acute intracranial hemorrhage, extra-axial fluid collection, hydrocephalus or midline shift. Age appropriate generalized parenchymal atrophy. There are periventricular and subcortical white matter hypodensities which are nonspecific but most likely related to microangiopathic gliosis. Intracranial arteriosclerosis. There is no sinus or mastoid fluid. Visualized orbits: No acute abnormalities. There is no acute fracture.     IMPRESSION: 1. No acute intracranial process. Authenticated and Electronically Signed by Becki Franklin DO on 09/24/2024 09:01:33 PM    XR Chest 1 View    Result Date: 9/24/2024  FINAL REPORT TECHNIQUE: null CLINICAL HISTORY: Severe Sepsis triage protocol ET and central line placement COMPARISON: null FINDINGS: 1 view chest x-ray Comparison: None Findings: Endotracheal tube distal tip is positioned 2.5 cm superior to the claude. A left central line crosses midline with distal tip terminating at the level of the right atrium. A percutaneous pacer overlies the superior lateral right hemithorax. Linear scarring or atelectasis involving the left lung base. A large granuloma of the periphery of the mid left lung measures 1.4 cm. No focal consolidation or large pleural effusion. No pneumothorax. The heart size is normal. Retrocardiac atelectasis. No acute fractures. The stomach lumen is gas-filled and dilated.     IMPRESSION: 1. Endotracheal tube terminates 2.5 cm superior to the claude. Left central line crosses midline with distal tip overlying the expected location of the right atrium. 2. Likely scarring or atelectasis of the left lung base including the retrocardiac region without acute process. Authenticated and Electronically Signed by Anthony Egan DO on 09/24/2024 07:13:52 PM         PROCEDURES    Critical Care    Performed by: Jeffrey Yan MD  Authorized by: Jeffrey Yan MD    Critical care provider statement:     Critical care  time (minutes):  96    Critical care time was exclusive of:  Separately billable procedures and treating other patients    Critical care was necessary to treat or prevent imminent or life-threatening deterioration of the following conditions:  Shock, cardiac failure, circulatory failure and respiratory failure    Critical care was time spent personally by me on the following activities:  Ordering and performing treatments and interventions, ordering and review of laboratory studies, ordering and review of radiographic studies, blood draw for specimens, development of treatment plan with patient or surrogate, pulse oximetry, re-evaluation of patient's condition, review of old charts, discussions with consultants, examination of patient and ventilator management    I assumed direction of critical care for this patient from another provider in my specialty: no      Care discussed with: admitting provider    Central Line At Bedside    Date/Time: 9/24/2024 8:43 PM    Performed by: Jeffrey Yan MD  Authorized by: Jeffrey Yan MD    Consent:     Consent obtained:  Emergent situation    Risks, benefits, and alternatives were discussed: yes    Pre-procedure details:     Indication(s): central venous access and insufficient peripheral access      Hand hygiene: Hand hygiene performed prior to insertion      Sterile barrier technique: All elements of maximal sterile technique followed      Skin preparation:  Chlorhexidine    Skin preparation agent: Skin preparation agent completely dried prior to procedure    Sedation:     Sedation type:  None  Anesthesia:     Anesthesia method:  Local infiltration    Local anesthetic:  Lidocaine 1% w/o epi  Procedure details:     Location:  L subclavian    Site selection rationale:  Groin soiled, IJ not accessible    Patient position:  Supine    Procedural supplies:  Triple lumen    Catheter size:  7 Fr    Landmarks identified: yes      Ultrasound guidance: no       Number of attempts:  1    Successful placement: no    Post-procedure details:     Post-procedure:  Dressing applied    Procedure completion:  Procedure terminated electively by provider    Complications:  Arterial puncture  Central Line At Bedside    Date/Time: 9/24/2024 8:44 PM    Performed by: Jeffrey Yan MD  Authorized by: Jeffrey Yan MD    Consent:     Consent obtained:  Emergent situation  Pre-procedure details:     Indication(s): central venous access and insufficient peripheral access      Hand hygiene: Hand hygiene performed prior to insertion      Sterile barrier technique: All elements of maximal sterile technique followed      Skin preparation:  Chlorhexidine  Sedation:     Sedation type:  None  Anesthesia:     Anesthesia method:  Local infiltration    Local anesthetic:  Lidocaine 1% w/o epi  Procedure details:     Location:  L internal jugular    Patient position:  Supine    Procedural supplies:  Triple lumen    Catheter size:  7 Fr    Landmarks identified: yes      Ultrasound guidance: yes      Ultrasound guidance timing: real time      Sterile ultrasound techniques: Sterile gel and sterile probe covers were used      Number of attempts:  1    Successful placement: yes    Post-procedure details:     Post-procedure:  Dressing applied    Assessment:  Blood return through all ports, free fluid flow, placement verified by x-ray and no pneumothorax on x-ray    Procedure completion:  Tolerated well, no immediate complications      Interpretations    O2 Sat: The patients oxygen saturation was 100% on Ventilator .  This was independently interpreted by me as Normal    EKG: I reviewed and independently interpreted the EKG as sinus tach rate of 120, normal axis, normal intervals, no ST elevation, no T wave inversions    Cardiac Monitoring: I reviewed and independently interpreted the Rhythm Strip as Sinus Tachycardia rate of 120 (following ROSC)    Radiology: I ordered and  independently reviewed the above noted radiographic studies.  I viewed images of CT Head which showed No intracranial hemorrhage per my independent interpretation. See radiologist's dictation for official interpretation.     Radiology: I ordered and independently reviewed the above noted radiographic studies.  I viewed images of CT Abdomen/Pelvis and CT Chest which showed rib fractures, constipation per my independent interpretation. See radiologist's dictation for official interpretation        MEDICATIONS GIVEN IN ER    Medications   sodium chloride 0.9 % flush 10 mL (has no administration in time range)   dexmedetomidine (PRECEDEX) 400 mcg in 100 mL NS infusion (0.5 mcg/kg/hr × 49 kg Intravenous Currently Infusing 9/24/24 2112)   sodium bicarbonate 8.4 % 150 mEq in sodium chloride 0.45 % 1,000 mL infusion (greater than 100 mEq) ( Intravenous Currently Infusing 9/24/24 2108)   norepinephrine (LEVOPHED) 8 mg in 250mL D5W infusion (0.04 mcg/kg/min × 49 kg Intravenous Rate/Dose Change 9/24/24 2106)   midazolam (VERSED) 100 mg in 100mL NS infusion (2 mg/hr Intravenous Currently Infusing 9/24/24 2110)   Midazolam HCl (PF) (VERSED) injection 2 mg (2 mg Intravenous Given 9/24/24 1814)   EPINEPHrine (ADRENALIN) injection (1 mg Intravenous Given 9/24/24 1748)   sodium bicarbonate injection 8.4% (50 mEq Intravenous Given 9/24/24 1812)   calcium chloride injection (1 g Intravenous Given 9/24/24 1806)   magnesium sulfate injection (2 g Intravenous Given 9/24/24 1807)   Midazolam HCl (PF) (VERSED) injection (2 mg Intravenous Given 9/24/24 1812)   iopamidol (ISOVUE-300) 61 % injection 100 mL (100 mL Intravenous Given 9/24/24 2033)   Midazolam HCl (PF) (VERSED) injection 2 mg (2 mg Intravenous Given 9/24/24 1950)         MEDICAL DECISION MAKING, PROGRESS, and CONSULTS    All labs, if obtained, have been independently reviewed by me.  All radiology studies, if obtained, have been reviewed by me and the radiologist dictating the  report.  All EKG's, if obtained, have been independently viewed and interpreted by me      Discussion below represents my analysis of pertinent findings related to patient's condition, differential diagnosis, treatment plan and final disposition.      Differential diagnosis:    84-year-old female presents to the ED as a full cardiopulmonary arrest, the patient is intubated, receiving compressions upon arrival.  Sounds to be pure respiratory arrest secondary to choking.  1 round of epinephrine given, patient continued on Matthew, until end-tidal CO2 idalmis above 30 at which point we held compressions, bedside ultrasound showed cardiac activity with good valvular opening, patient palpable pulse.  Immediate ABG showed severe acidosis, bicarb given, bicarb drip started.  Central line placed, will obtain CT head, chest and abdomen.  Obtain broad laboratory workup.    Additional Sources:  External (non-ED) record review:  Not available for review       Orders placed during this visit:  Orders Placed This Encounter   Procedures    Critical Care    Insert Central Line At Bedside    Insert Central Line At Bedside    Blood Culture - Blood,    Blood Culture - Blood,    XR Chest 1 View    CT Head Without Contrast    CT Angiogram Chest    XR Abdomen KUB    CT Abdomen Pelvis With Contrast    Blood Gas, Arterial With Co-Ox    [place] Draw    Comprehensive Metabolic Panel    Protime-INR    aPTT    Magnesium    Phosphorus    Lactic Acid, Plasma    C-reactive Protein    Blood Gas, Arterial -With Co-Ox Panel: Yes    Urinalysis With Microscopic If Indicated (No Culture) - Urine, Catheter    CBC Auto Differential    Scan Slide    STAT Lactic Acid, Reflex    Manual Differential    Blood Gas, Arterial With Co-Ox    Urinalysis, Microscopic Only - Urine, Clean Catch    Undress & Gown    Continuous Pulse Oximetry    Measure Blood Pressure    Vital Signs    Target Arousal Level RASS -1 to -2    Oxygen Therapy- Nasal Cannula; Titrate 1-6 LPM Per  SpO2; 90 - 95%    Ventilator - Vent Mode: AC/VC+; FiO2: Titrate Per SpO2; Titrate Oxygen for SpO2: 90% or Greater    POC Glucose Once    ECG 12 Lead Other; Sepsis    Insert Large Bore Peripheral IV    Insert 2nd Large Bore Peripheral IV    Inpatient Admission    ED Bed Request    CBC & Differential    Green Top (Gel)    Lavender Top    Gold Top - SST    Light Blue Top         Additional orders considered but not ordered:  None    ED Course:    Consultants:  Hospitalist    ED Course as of 09/24/24 2158   Tue Sep 24, 2024   1817 Patient's initial ABG showed severe acidosis, multiple pushes of bicarb ordered, bicarb drip started [CS]   1828 Lactic Acid, Plasma(!!)  Lactic was significantly elevated, patient is receiving IV fluids for this [CS]   1840 XR Chest 1 View  Chest x-ray shows ET tube in central line in good position, good to use [CS]   2017 Blood Gas, Arterial With Co-Ox(!)  pH is significantly improved [CS]   2018 CBC & Differential(!)  Hemoglobin decreased, no baseline available [CS]   2110 Urinalysis With Microscopic If Indicated (No Culture) - Urine, Catheter(!)  Urine appears likely infected will treat with antibiotics [CS]   2144 Patient's labs are improving, she is sedated, but overbreathing the vent, her CT scans do not show intracranial injury, she has some rib fractures as you might expect, intra-abdominal findings were significant for fecal impaction, will contact the hospitalist for admission at this juncture [CS]   2153 Spoke directly to Dr. Mccormick, he agrees to evaluate the patient for admission [CS]      ED Course User Index  [CS] Jeffrey Yan MD           After my consideration of clinical presentation and any laboratory/radiology studies obtained, I discussed the findings with the patient/patient representative who is in agreement with the treatment plan and the final disposition. Risks and benefits of admission was discussed     AS OF 21:58 EDT VITALS:    BP - 117/54  HR - 70  TEMP  - 99.2 °F (37.3 °C) (Rectal)  O2 SATS - 96%    I reviewed the patients prescription monitoring report if available prior to discharge    DIAGNOSIS  Final diagnoses:   Respiratory arrest   Choking, initial encounter         DISPOSITION  ED Disposition       ED Disposition   Decision to Admit    Condition   --    Comment   Level of Care: Critical Care [6]   Diagnosis: Cardiac arrest [427.5.ICD-9-CM]                     Please note that portions of this document were completed with voice recognition software.        Jeffrey Yan MD  09/24/24 2204      Electronically signed by Jeffrey Yan MD at 09/24/24 2204       Vital Signs (last day)       Date/Time Temp Temp src Pulse Resp BP Patient Position SpO2    10/01/24 0854 -- -- -- 18 -- -- --    10/01/24 0727 -- -- -- 16 -- -- --    10/01/24 0600 -- -- 97 -- 174/75 Lying 96    10/01/24 0400 98.1 (36.7) Oral 88 -- 161/77 Lying 96    10/01/24 0200 -- -- 89 -- 169/73 -- 96    10/01/24 0000 97.4 (36.3) Oral 89 -- 155/73 -- 97    09/30/24 2200 -- -- 103 -- 165/80 Lying 96    09/30/24 2000 -- -- 103 -- 193/80 Lying 97    09/30/24 1900 97.7 (36.5) Oral 90 -- -- -- 96    09/30/24 1800 -- -- 118 -- 189/93 -- 97    09/30/24 1600 -- -- 100 22 185/90 Lying 98    09/30/24 1500 97.2 (36.2) Axillary -- -- -- -- --    09/30/24 1300 97.7 (36.5) Oral -- -- -- -- --    09/30/24 1200 -- -- 90 -- 184/76 -- 98    09/30/24 1000 -- -- 90 -- 180/81 -- 97    09/30/24 0800 -- -- 89 22 177/79 Lying 97    09/30/24 0700 97.5 (36.4) Oral -- -- -- -- --    09/30/24 0647 -- -- 90 -- -- -- 97    09/30/24 0600 -- -- 90 -- 172/77 -- 97    09/30/24 0400 97.6 (36.4) Oral 92 -- 160/69 Lying 97    09/30/24 0200 -- -- 95 -- 150/72 -- 96    09/30/24 0000 98.7 (37.1) Oral 101 -- 195/84 Lying 95          Current Facility-Administered Medications   Medication Dose Route Frequency Provider Last Rate Last Admin    chlorhexidine (PERIDEX) 0.12 % solution 15 mL  15 mL Mouth/Throat Q12H Anny,  Hugo Bernal, DO   15 mL at 09/30/24 1025    Glycopyrrolate PF injection 0.1 mg  0.1 mg Intravenous Q4H PRN Sharath Monae MD   0.1 mg at 09/30/24 1657    LORazepam (ATIVAN) injection 1 mg  1 mg Intravenous Q4H PRN Sharath Monea MD        Morphine sulfate (PF) injection 1 mg  1 mg Intravenous Q2H PRN Sharath Monae MD   1 mg at 09/30/24 1657    sodium chloride 0.9 % flush 10 mL  10 mL Intravenous Q12H Hugo Mccormick DO   10 mL at 10/01/24 0936    sodium chloride 0.9 % flush 10 mL  10 mL Intravenous PRN Hugo Mccormick DO         Lab Results (last 24 hours)       ** No results found for the last 24 hours. **          Imaging Results (Last 24 Hours)       ** No results found for the last 24 hours. **          Orders (last 24 hrs)        Start     Ordered    10/01/24 0936  Vital Signs  Every Shift       10/01/24 0935    10/01/24 0935  NPO Diet NPO Type: Strict NPO  Diet Effective Now         10/01/24 0934    09/30/24 1632  Please hold tube feeds if residual is more than 450 mL every 4-6 hourly  Nursing Communication  Once        Comments: Please hold tube feeds if residual is more than 450 mL every 4-6 hourly    09/30/24 1631    09/30/24 1631  Please hold tube feeds if residual is more than 250 mL every 4-6 hourly  Nursing Communication  Once,   Status:  Canceled        Comments: Please hold tube feeds if residual is more than 250 mL every 4-6 hourly    09/30/24 1631    09/29/24 0946  Glycopyrrolate PF injection 0.1 mg  Every 4 Hours PRN         09/29/24 0946    09/29/24 0945  LORazepam (ATIVAN) injection 1 mg  Every 4 Hours PRN         09/29/24 0945    09/28/24 1655  Morphine sulfate (PF) injection 1 mg  Every 2 Hours PRN         09/28/24 1656    09/27/24 2100  levETIRAcetam in NaCl 0.82% (KEPPRA) IVPB 500 mg  Every 12 Hours Scheduled,   Status:  Discontinued         09/27/24 0941    09/26/24 1700  fosphenytoin (Cerebyx) 100 mg PE in sodium chloride 0.9 % 50 mL IVPB  Every 8 Hours,   Status:  Discontinued     "     09/26/24 0829    09/26/24 1626  acetaminophen (TYLENOL) tablet 650 mg  Every 6 Hours PRN,   Status:  Discontinued         09/26/24 1626    09/26/24 1403  Tube Feeding Assessment and I/O  Every Shift,   Status:  Canceled       09/26/24 1403    09/26/24 1402  Flush Feeding Tube With 30-50mL Water As Needed  As Needed,   Status:  Canceled       09/26/24 1403    09/26/24 1402  Isidro & Document Feeding Tube Depth (in cm)  As Needed,   Status:  Canceled       09/26/24 1403    09/26/24 1402  Verify Feeding Tube Placement Upon Insertion & As Needed  As Needed,   Status:  Canceled       09/26/24 1403    09/26/24 0200  Spontaneous Awakening Trial  Daily - SAT,   Status:  Canceled       09/25/24 0004    09/26/24 0200  Spontaneous Breathing Trial  Daily - SBT,   Status:  Canceled       09/25/24 0004    09/25/24 1010  Urinary Catheter Care  Every Shift      Placed in \"And\" Linked Group    09/25/24 1009    09/25/24 0900  pantoprazole (PROTONIX) injection 40 mg  Every 24 Hours Scheduled,   Status:  Discontinued         09/25/24 0004    09/25/24 0400  Oral Care & Teeth Brushing - Intubated Patient  Every 4 Hours      Comments: [place] Teeth at Least 2x/day    09/25/24 0004    09/25/24 0400  Oral Care & Teeth Brushing - Intubated Patient  Every 4 Hours,   Status:  Canceled      Comments: [place] Teeth at Least 2x/day    09/25/24 0004    09/25/24 0100  chlorhexidine (PERIDEX) 0.12 % solution 15 mL  Every 12 Hours Scheduled         09/25/24 0004    09/25/24 0100  Vital Signs Every Hour and Per Hospital Policy Based on Patient Condition  Every Hour,   Status:  Canceled       09/25/24 0004    09/25/24 0100  Intake & Output  Every Hour       09/25/24 0004    09/25/24 0100  sodium chloride 0.9 % flush 10 mL  Every 12 Hours Scheduled         09/25/24 0004    09/25/24 0001  Oral Care - Patient Not on NPPV & Not Intubated  Every Shift,   Status:  Canceled       09/25/24 0004    09/25/24 0000  nitroglycerin (NITROSTAT) SL tablet 0.4 mg  Every " 5 Minutes PRN,   Status:  Discontinued         24 0004    24 0000  sodium chloride 0.9 % flush 10 mL  As Needed         24 0004    24 0000  sodium chloride 0.9 % infusion 40 mL  As Needed,   Status:  Discontinued         24 0004    Unscheduled  Oxygen Therapy- Nasal Cannula; Titrate 1-6 LPM Per SpO2; 90 - 95%  Continuous PRN       24 180    Unscheduled  Wound Care  As Needed       24    --  QUEtiapine (SEROquel) 25 MG tablet  Nightly         24    --  amLODIPine (NORVASC) 2.5 MG tablet  Daily         24    --  losartan (COZAAR) 25 MG tablet  Daily         24    --  aspirin 81 MG chewable tablet  Daily         24    --  atorvastatin (LIPITOR) 40 MG tablet  Daily         24 1257                     Physician Progress Notes (most recent note)        Sharath Monae MD at 24 1521                [facility]    PROGRESS NOTE    Name:  Annabel Hyman   Age:  84 y.o.  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    Subjective:    Annabel Hyman was seen and examined this morning.  Patient has had no change in clinical status.  She does seem to have quasi purposeful movements around the mouth, eyes and decerebrate posturing at times.  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  Family has decided to hold off on tube feedings and we will discontinue the NG tube.  Family is now coming on board with comfort measures but they still do not want to make a decision about terminal extubation.    Hospital Course:    Annabel Hyman is an 84-year-old female, chronically  wheelchair-bound, history of CVA, hypertension, Alzheimer dementia was brought to the emergency room by EMS after cardiac arrest at home.  Patient apparently was sitting up and eating a cake while a strawberry got stuck in her throat.  She subsequently became unresponsive and turned blue.  Family started CPR and apparently took about 15 minutes for EMS arrival who continued the CPR with the Matthew device.  Family states that the patient did not turn cyanotic prior to EMS arrival.  Patient was transported to the ER with ongoing CPR and after she was in the ER and another dose of epinephrine, ROSC was obtained.  Patient was intubated and a left IJ central line was placed.  She was initiated on Levophed and was admitted to the ICU.    Initial vitals: Temperature 99.2, pulse 121, blood pressure 138/56 with subsequent drop to 83/49.  Pulse oxygen saturation 89% on arrival.  Initial ABG pH less than 6.7, pCO2 71, p.o. 213, bicarb 9.6 on 100% Ambu bag.  CMP showed a potassium of 5.3, BUN 28, creatinine 1.46 (unknown baseline), glucose 240, , .  Lactic acid was 14.  C-reactive protein 0.45.  INR 1.32.  WBC 12.3, hemoglobin 9.6.  Chest x-ray showed likely scarring or atelectasis of the left lung base.  Noncontrast CT of the head showed no acute intracranial process.  CT angiogram of the chest was negative for pulmonary embolism or aortic aneurysm.  Showed acute fractures of the left 5-7th anterior ribs and right anterior sixth and seventh ribs.  No pneumothorax.  T3 and T12 compression fractures of indeterminate age noted.  CT of the abdomen and pelvis with contrast showed very large amount of stool in the rectum with mild rectal wall thickening.  Stomach is distended with fluid.  Gallbladder not identified.  Intra and extrahepatic biliary dilatation without choledocholithiasis noted.  L4 and L5 compression fractures of indeterminate age.  Patient was given IV Zosyn and was placed on Precedex, Levophed and was  subsequently admitted to the medical ICU.    Patient was seen by Dr. Castro from pulmonology for ventilatory management.  She was also seen by Dr. Kiran for tonic-clonic seizures while on mechanical ventilation.  He recommended Keppra and fosphenytoin.  EEG was ordered.  Patient's blood pressure improved and Levophed was discontinued.  Due to high suspicion of anoxic encephalopathy, palliative care services were consulted.    Patient was continued on Zosyn for aspiration pneumonia.  She continued to have fevers despite being on antibiotics therapy and started dropping her blood pressures.  She was initiated on vancomycin as well as Levophed.  Repeat CT scan of the head without contrast done on 9/27/2024 unfortunately showed evidence of edema, more pronounced in the cerebellum and cerebrum compatible with anoxic brain injury.  After discussion with family and palliative care services, patient's CODE STATUS was changed to DNR.  Patient continued to be unresponsive and eventually developed pinpoint pupils that were not reactive likely from posterior brain edema and herniation.  After discussion with family, patient's CODE STATUS was changed to comfort measures.      Review of Systems:     All systems were reviewed and negative except as mentioned in subjective, assessment and plan.    Vital Signs:    Temp:  [97.5 °F (36.4 °C)-99.2 °F (37.3 °C)] 97.7 °F (36.5 °C)  Heart Rate:  [] 90  Resp:  [22-23] 22  BP: (144-195)/(62-95) 184/76  FiO2 (%):  [30 %] 30 %    Intake and output:    I/O last 3 completed shifts:  In: 3626 [I.V.:2758; Other:495; NG/GT:373]  Out: 2975 [Urine:2975]  No intake/output data recorded.    Physical Examination:    General Appearance:  Unresponsive on mechanical ventilation.     Head:  Atraumatic and normocephalic.   Eyes: Conjunctivae and sclerae normal, no icterus.  Looks pale.  Pupils bilaterally pinpoint and nonreactive.  Left eye is deviated medially and inferiorly.   Throat: No oral  lesions, no thrush, oral mucosa moist.  Endotracheal tube is in place.  NG tube is in place.   Neck: Supple, trachea midline, no thyromegaly.  Left internal jugular central venous line noted.   Lungs:   Breath sounds heard bilaterally equally.  No wheezing or crackles. No Pleural rub or bronchial breathing.   Heart:  Normal S1 and S2, no murmur, no gallop, no rub. No JVD.   Abdomen:   Normal bowel sounds, no masses, no organomegaly. Soft, nontender, nondistended, no rebound tenderness.  Butler catheter is in place.   Extremities: Supple, no edema, no cyanosis, no clubbing.  Poor muscle mass.   Skin: No bleeding or rash.   Neurologic: Unresponsive.  Currently on mechanical ventilation.    Laboratory results:    Results from last 7 days   Lab Units 09/29/24  0401 09/28/24  0422 09/27/24  0716 09/26/24  0506   SODIUM mmol/L 129* 130* 133* 145   POTASSIUM mmol/L 3.5 3.4* 3.3* 3.1*   CHLORIDE mmol/L 98 101 98 103   CO2 mmol/L 20.5* 22.4 24.1 30.3*   BUN mg/dL 12 16 23 30*   CREATININE mg/dL 0.80 0.97 1.16* 1.33*   CALCIUM mg/dL 7.3* 7.4* 7.7* 8.0*   BILIRUBIN mg/dL  --  0.2 0.3 0.4   ALK PHOS U/L  --  86 115 155*   ALT (SGPT) U/L  --  71* 94* 150*   AST (SGOT) U/L  --  132* 179* 202*   GLUCOSE mg/dL 182* 190* 158* 155*     Results from last 7 days   Lab Units 09/29/24  0401 09/28/24  0422 09/27/24  0716   WBC 10*3/mm3 6.28 6.31 10.82*   HEMOGLOBIN g/dL 8.7* 6.8* 7.2*   HEMATOCRIT % 26.9* 21.5* 23.0*   PLATELETS 10*3/mm3 169 148 176     Results from last 7 days   Lab Units 09/24/24  1828   INR  1.32*     Results from last 7 days   Lab Units 09/24/24  2040 09/24/24  1852   BLOODCX   --  No growth at 5 days  No growth at 5 days   URINECX  >100,000 CFU/mL Klebsiella pneumoniae ssp pneumoniae*  --      Results from last 7 days   Lab Units 09/28/24  0746   PH, ARTERIAL pH units 7.432   PO2 ART mm Hg 109.0*   PCO2, ARTERIAL mm Hg 36.8   HCO3 ART mmol/L 24.5     I have reviewed the patient's laboratory results.    Radiology  results:    CT Head Without Contrast    Result Date: 9/29/2024  PROCEDURE: CT HEAD WO CONTRAST-  HISTORY: To look for cerebral edema from anoxic brain injury; R09.2-Respiratory arrest; T17.308A-Unspecified foreign body in larynx causing other injury, initial encounter  COMPARISON: 2 days prior.  TECHNIQUE: Multiple axial CT images were performed from the foramen magnum to the vertex. Individualized dose reduction techniques using automated exposure control or adjustment of mA and/or kV according to the patient size were employed.  FINDINGS: There is mild, age-appropriate, stable generalized cerebral atrophy. The ventricles are enlarged. There is decreased attenuation in a significant portion of the cerebellum with loss of some sulci suggesting edema, similar to the prior exam. There is decreased attenuation in the caudate nuclei bilaterally, particularly compared to 09/24/2024 as well as decreased attenuation in the external capsules bilaterally. Lateral ventricles are mildly decreased in size, particularly compared to 09/24/2024. There is some loss of cerebral sulci compared to 09/27/2024. No masses are identified. No extra-axial fluid is seen. The paranasal sinuses are unremarkable.      Impression: Evidence of cerebral edema involving the cerebrum and the cerebellum as described slightly worsened compared to 09/27/2024.     CTDI: 32.17 mGy DLP:504.09 mGy.cm  This report was signed and finalized on 9/29/2024 10:25 AM by Ana Pacheco MD.     I have reviewed the patient's radiology reports.    Medication Review:     I have reviewed the patient's active and prn medications.     Problem List:      Cardiac arrest    Acute respiratory failure with hypoxia and hypercapnia    Aspiration pneumonitis    Metabolic acidosis    ANDERS (acute kidney injury)    Ischemic hepatitis    Stercoral colitis    Assessment:    Cardiac arrest at home with prolonged CPR, POA.  Severe anoxic encephalopathy with seizures, POA.  Acute hypoxic  and hypercapnic respiratory failure likely secondary to #2.  Aspiration pneumonitis, POA.  Acute renal failure, POA.  Septic shock, not POA.  Acute ischemic hepatitis (shock liver), POA.  Constipation with stercoral colitis noted on CT scan.  Severe metabolic acidosis and lactic acidosis secondary to #1, POA.  Hypernatremia, POA.  History of stroke with functional quadriplegia.    Plan:    Anoxic encephalopathy.  - Patient does have severe anoxic encephalopathy with edema of the cerebellum and high risk for herniation.  She is already developing paralytic strabismus.  - Patient is currently having pinpoint pupils likely from worsening brain edema and possible herniation.  - Since the family has decided to make her comfort care we will discontinue all medications and keep her only on comfort measures.  - EEG showed moderate degree of diffuse cerebral dysfunction without any seizures a few days ago.  - Patient's prognosis is extremely poor and I have recommended terminal weaning.  Family is currently agreeable for comfort measures but did not want to make a decision regarding extubation.  - Repeat CT of the head shows worsening of cerebral edema.  - Continue morphine, Robinul and Ativan for comfort measures.    Cardiac arrest with prolonged CPR.  - Patient likely has suffered some amount of anoxic neurological injury and has had seizures and fevers which is not a good prognostic indicator.  - Repeat CT of the head done on 9/21/2024 showed worsening of anoxic brain injury.  There is significant swelling in the cerebellum and she is at high risk for herniation.  - Exact etiology of cardiac arrest is uncertain but likely a respiratory event with aspiration pneumonitis, hypoxia leading to cardiac arrest.    Respiratory failure/aspiration pneumonia.  - Completed a course of antibiotics therapy.    Acute on chronic anemia.  - Status post 1 unit of blood transfusion.    Nutrition.  - Patient does have significant post  residual volumes and we will discontinue tube feeds.  - Family is agreeable to discontinue NG tube.    I have discussed the patient's condition and treatment plan with her daughters and son who are at the bedside.  They are agreeable for comfort measures but somehow hesitant to make a decision to do compassionate extubation.    Discussed with nursing staff at the bedside.    I have reviewed the copied text and it is accurate as of 24    DVT Prophylaxis: Comfort measures.  Code Status: Comfort measures  Diet: N.p.o.  Discharge Plan: Pending.    Sharath Monae MD  24  15:21 EDT    Dictated utilizing Dragon dictation.      Electronically signed by Sharath Monae MD at 24 1530          Consult Notes (most recent note)        Kelsie Kang PA-C at 24 0947        Consult Orders    1. Inpatient Palliative Care Team Consult [[id number]] ordered by Hugo Mccormick DO at 24 [phone]              Attestation signed by Linden Blount DO at 24 1352    I have reviewed this documentation and agree.                      [facility]    INPATIENT PALLIATIVE CARE CONSULT      Name:  Annabel Hyman   Age:  84 y.o.  Sex:  female  :  1940  MRN:  [id number]   Visit Number:  [id number]  Date of Service:  24  Date of Admission:  2024  Primary Care Physician:  Provider, No Known    Consulting Physician:  Dr. Mccormick    Reason For Consult:  Introduction to Palliative services, Goals of care discussions , and Support during serious illness    History of Present Illness:  Annabel Hyman is a 84 y.o. female with past medical history of impaired mobility and debility who is largely wheelchair dependent, history of CVA, Alzheimer dementia, HTN.  Patient presented to [facility] on 2024 secondary to cardiac arrest at home.  Per chart review, patient sitting upright, eating, when a strawberry led to airway obstruction.  Patient became unresponsive, cyanotic.   CPR initiated by family for approximately 15 minutes prior to EMS arrival.  Patient transported to ED with ongoing ACLS interventions.  Upon ED evaluation, ROSC obtained.  Initial vitals noting temp 99.2, pulse 121, /56 with subsequent drop to 83 systolic.  She was initiated on pressor support.  Laboratory evaluation noting ABG pH <6.7, pCO2 71, pO2 13, bicarb 9.6 on 100%.  CMP with K 5.3, BUN 28, creatinine 1.46, glucose 240, , , lactic acid 14.  CRP 0.45, INR 1.32, WBC 12.3, hgb 9.6.  Chest x-ray showed likely scarring or atelectasis of the left lung base.  Noncontrast CT of the head showed no acute intracranial process.  CT angiogram of the chest was negative for pulmonary embolism or aortic aneurysm.  Showed acute fractures of the left 5-7th anterior ribs and right anterior sixth and seventh ribs.  No pneumothorax.  T3 and T12 compression fractures of indeterminate age noted.  CT of the abdomen and pelvis with contrast showed very large amount of stool in the rectum with mild rectal wall thickening.  Stomach is distended with fluid.  Gallbladder not identified.  Intra and extrahepatic biliary dilatation without choledocholithiasis noted.  L4 and L5 compression fractures of indeterminate age.  She was subsequently admitted to the primary medicine service requiring ICU level of admission.  Pulmonology consulted in addition to neurology for tonic clonic like seizures.  Subsequently patient initiated on Keppra and Fosphenytoin, EEG recommended.  Ultimately concerns for anoxic encephalopathy.  Palliative care consulted to further review GOC in the setting of complex medical decision making.      After speaking with the primary medicine service, palliative team me with family at bedside.  Patient continues on mechanical ventilation.  Propofol discontinued overnight.  Pressor support and versed has also been discontinued.  She is on  precedex drip at 0.2 mcg/hr.  Daughter and granddaughter are at  bedside.  Initially, GOC discussions scheduled for today, however patient's daughter has been ill and unable to be present today.  Detailed GOC discussions requested to be deferred until her children can be present together.      I spoke with daughter and granddaughter regarding patient's functional status prior to acute event.  They describe largely total care with all ADLs.  She is non-ambulatory, requiring significant assistance with transfers.  She is able to sit in a wheelchair and continues to enjoy going for car rides.  Verbal responses are limited.  She requires assistance with meals, daughter noting some slow weight loss over the past few months.  She is incontinent of bowel/bladder at baseline.  Patient is , spouse passed 22 years prior.  She has 11 children, 9 of which have been visiting at bedside.  She lives with her family, transitioning from one household to another every few months.  They note that patient had functional decline since stroke, however reflect on the progression of her underlying dementia.  They are agreeable to ongoing palliative follow and discussions regarding GOC.        Review of Systems   Unable to perform ROS: Intubated        Medical History:  Past Medical History:   Diagnosis Date    Dysphagia     Hypertension     Stroke 2013      History reviewed. No pertinent surgical history.  History reviewed. No pertinent family history.  Allergies: Patient has no known allergies.    Hospital Scheduled Medications:    Current Facility-Administered Medications:     sennosides-docusate (PERICOLACE) 8.6-50 MG per tablet 2 tablet, 2 tablet, Nasogastric, BID **AND** polyethylene glycol (MIRALAX) packet 17 g, 17 g, Nasogastric, Daily PRN **AND** [DISCONTINUED] bisacodyl (DULCOLAX) EC tablet 5 mg, 5 mg, Oral, Daily PRN **AND** bisacodyl (DULCOLAX) suppository 10 mg, 10 mg, Rectal, Daily PRN, Sharath Monae MD    chlorhexidine (PERIDEX) 0.12 % solution 15 mL, 15 mL, Mouth/Throat, Q12H,  Hugo Mccormick DO, 15 mL at 09/26/24 0834    dexmedetomidine (PRECEDEX) 400 mcg in 100 mL NS infusion, 0.2 mcg/kg/hr, Intravenous, Titrated, Cheikh Kiran MD    dextrose 5 % with KCl 20 mEq/L infusion, 100 mL/hr, Intravenous, Continuous, Sharath Monae MD, Last Rate: 100 mL/hr at 09/26/24 0525, 100 mL/hr at 09/26/24 0525    Enoxaparin Sodium (LOVENOX) syringe 30 mg, 30 mg, Subcutaneous, Q24H, Hugo Mccormick DO, 30 mg at 09/26/24 0404    fosphenytoin (Cerebyx) 100 mg PE in sodium chloride 0.9 % 50 mL IVPB, 100 mg PE, Intravenous, Q8H, Cheikh Kiran MD    fosphenytoin (Cerebyx) 720 mg PE in sodium chloride 0.9 % 100 mL IVPB, 720 mg PE, Intravenous, Once, Cheikh Kiran MD    levETIRAcetam in NaCl 0.75% (KEPPRA) IVPB 1,000 mg, 1,000 mg, Intravenous, Q12H, Cheikh Kiran MD, 1,000 mg at 09/26/24 0853    nitroglycerin (NITROSTAT) SL tablet 0.4 mg, 0.4 mg, Sublingual, Q5 Min PRN, Hugo Mccormick DO    pantoprazole (PROTONIX) injection 40 mg, 40 mg, Intravenous, Q24H, Hugo Mccormick DO, 40 mg at 09/26/24 0834    Pharmacy to Dose enoxaparin (LOVENOX), , Does not apply, Continuous PRN, Hugo Mccormick DO    Pharmacy to Dose Zosyn, , Does not apply, Continuous PRN, Hugo Mccormick DO    piperacillin-tazobactam (ZOSYN) IVPB 4.5 g IVPB in 100 mL NS (VTB), 4.5 g, Intravenous, Q8H, Sharath Monae MD, 4.5 g at 09/26/24 0833    sodium chloride 0.9 % flush 10 mL, 10 mL, Intravenous, Q12H, Hugo Mccormick DO, 10 mL at 09/26/24 0834    sodium chloride 0.9 % flush 10 mL, 10 mL, Intravenous, PRN, Hugo Mccormick,     sodium chloride 0.9 % infusion 40 mL, 40 mL, Intravenous, PRN, Hugo Mccormick,   I have utilized all immediate resources to obtain, update, or review the patient's current medications (including all prescription, over-the-counter products, herbals, and/or nutritional supplements).      Vitals: /65   Pulse 86   Temp 98.9 °F (37.2 °C) (Oral)    "Resp 22   Ht 139.7 cm (55\")   Wt 48.1 kg (106 lb 0.7 oz)   SpO2 100%   BMI 24.65 kg/m²     Intake/Output Summary (Last 24 hours) at 9/26/2024 0947  Last data filed at 9/26/2024 0930  Gross per 24 hour   Intake 3963.39 ml   Output 2415 ml   Net 1548.39 ml       Physical Exam  Vitals and nursing note reviewed.   Constitutional:       General: She is not in acute distress.     Appearance: She is well-developed. She is ill-appearing. She is not diaphoretic.      Interventions: She is sedated and intubated.   HENT:      Head: Normocephalic and atraumatic.      Mouth/Throat:      Mouth: Mucous membranes are moist.      Pharynx: Oropharynx is clear.      Comments: ET tube in place  Eyes:      Conjunctiva/sclera: Conjunctivae normal.      Pupils: Pupils are equal, round, and reactive to light.   Cardiovascular:      Rate and Rhythm: Normal rate and regular rhythm.   Pulmonary:      Effort: She is intubated.      Breath sounds: Normal breath sounds.      Comments: MV  Abdominal:      General: There is no distension.      Palpations: Abdomen is soft.   Musculoskeletal:         General: No swelling.      Cervical back: Neck supple.   Skin:     General: Skin is warm and dry.      Capillary Refill: Capillary refill takes less than 2 seconds.   Neurological:      Comments: Unable to fully assess    Psychiatric:      Comments: Calm affect, does not appear agitated or restless           Diagnostics Review:  Laboratory Data:  Results from last 7 days   Lab Units 09/26/24  0506 09/25/24  0412 09/24/24  1828   SODIUM mmol/L 145 150* 143   POTASSIUM mmol/L 3.1* 3.1* 5.3*   CHLORIDE mmol/L 103 104 104   CO2 mmol/L 30.3* 30.8* 13.8*   BUN mg/dL 30* 39* 28*   CREATININE mg/dL 1.33* 1.23* 1.46*   CALCIUM mg/dL 8.0* 9.4 9.6   ALBUMIN g/dL 3.0* 3.6 3.6   BILIRUBIN mg/dL 0.4 0.4 0.3   ALK PHOS U/L 155* 231* 135*   ALT (SGPT) U/L 150* 235* 152*   AST (SGOT) U/L 202* 339* 197*   GLUCOSE mg/dL 155* 215* 240*     Results from last 7 days "   Lab Units 09/26/24  0506 09/25/24  0412 09/24/24  1828   WBC 10*3/mm3 15.26* 18.45* 12.31*   HEMOGLOBIN g/dL 7.8* 9.5* 9.6*   HEMATOCRIT % 24.7* 29.5* 33.3*   PLATELETS 10*3/mm3 243 302 250     Results from last 7 days   Lab Units 09/24/24  1828   INR  1.32*     Results from last 7 days   Lab Units 09/26/24  0724   PH, ARTERIAL pH units 7.516*   PO2 ART mm Hg 113.0*   PCO2, ARTERIAL mm Hg 39.3   HCO3 ART mmol/L 31.8*     Results from last 7 days   Lab Units 09/24/24  2040   COLOR UA  Yellow   GLUCOSE UA  Negative   KETONES UA  Negative   BLOOD UA  Moderate (2+)*   LEUKOCYTES UA  Small (1+)*   PH, URINE  6.0   BILIRUBIN UA  Negative   UROBILINOGEN UA  0.2 E.U./dL     Pain Management Panel           No data to display              Results from last 7 days   Lab Units 09/24/24  1852   BLOODCX  No growth at 24 hours  No growth at 24 hours     Nutritional Status:   Results from last 7 days   Lab Units 09/26/24  0506 09/25/24  0412 09/24/24  1828   ALBUMIN g/dL 3.0* 3.6 3.6     Body mass index is 24.65 kg/m².  Documented weights    09/24/24 1808 09/24/24 2315 09/25/24 0600 09/26/24 0545   Weight: 49 kg (108 lb) 47.4 kg (104 lb 8 oz) 47.4 kg (104 lb 8 oz) 48.1 kg (106 lb 0.7 oz)        Radiology:  XR Chest 1 View    Result Date: 9/26/2024   PROCEDURE: XR CHEST 1 VW-  HISTORY: Resp Failure.; R09.2-Respiratory arrest; T17.308A-Unspecified foreign body in larynx causing other injury, initial encounter  COMPARISON: 2 days prior.  FINDINGS: The heart is normal in size. The mediastinum is unremarkable. Decreased inspiratory effort noted with crowding of the lung markings in both lung bases. No area of consolidation is seen. There is mild interstitial disease similar to prior. Left internal jugular catheter and ET tube are in stable position. NG tube placed since the prior exam. Tip and sideport are located in the fundus of the stomach. Previously described gaseous distention of the stomach has resolved. There is no  pneumothorax.  There are no acute osseous abnormalities.      Interval placement of NG tube with resolved gaseous distention of the stomach..  Stable ET tube and left internal jugular catheter from prior..  No acute infiltrate seen.    This report was signed and finalized on 9/26/2024 7:21 AM by Ana Pacheco MD.      XR Abdomen KUB    Result Date: 9/25/2024  PROCEDURE: XR ABDOMEN KUB-  INDICATION:  NG tube insertion verification; R09.2-Respiratory arrest; T17.308A-Unspecified foreign body in larynx causing other injury, initial encounter  COMPARISON: 1 day prior.  FINDINGS:  A supine view of the abdomen was obtained.  The bowel gas pattern is normal.  There are no pathologic calcifications.  No acute osseous abnormality is identified. NG tube has been advanced since the prior exam. Tip and the sideport are now located in the fundus of the stomach. Previously described gaseous distention of the stomach has resolved. Moderate amount of stool identified in the rectosigmoid, fecal impaction not excluded.      Interval advancement NG tube now in good position with resolution of previous gaseous distention of the stomach..     This report was signed and finalized on 9/25/2024 11:02 AM by Ana Pacheco MD.      XR Abdomen KUB    Result Date: 9/25/2024  PROCEDURE: XR ABDOMEN KUB-  INDICATION:  NG placement; R09.2-Respiratory arrest; T17.308A-Unspecified foreign body in larynx causing other injury, initial encounter  COMPARISON:  None.  FINDINGS:  A supine view of the abdomen was obtained. There is gaseous distention of the stomach as seen on CT earlier the same day. As seen on prior CT there is a NG tube with the tip in the fundus of the stomach. The sideport is located in the distal esophagus; recommend advancement by 4 to 5 cm..  There are no pathologic calcifications.  No acute osseous abnormality is identified. There is evidence of old calcified granulomatous disease. Left internal jugular catheter identified with tip in  the right atrium.      NG tube tip present in the fundus of the stomach with sideport in the distal esophagus; recommend advancement of NG tube by 4 to 5 cm..  Partially visualized left internal jugular catheter.     This report was signed and finalized on 9/25/2024 9:38 AM by Ana Pacheco MD.      CT Abdomen Pelvis With Contrast    Result Date: 9/24/2024  FINAL REPORT TECHNIQUE: null CLINICAL HISTORY: cardiac arrest COMPARISON: null FINDINGS: Exam: CT Abdomen and Pelvis with contrast Comparison: None Clinical history: Cardiac arrest Findings: NG tube tip in the stomach. Fluid in the distal esophagus may be reflective of gastroesophageal reflux. Stomach is distended with fluid. Mild bibasilar atelectasis. Liver does not demonstrate any focal lesions. Gallbladder not identified and is likely surgically absent. Enlargement of the Intrahepatic biliary ducts, common hepatic duct, common bile duct and pancreatic duct. No choledocholithiasis identified. Findings may be due to pancreatic head lesion, ampullary stricture/lesion, sphincter dysfunction or post cholecystectomy status. No pancreatic head lesion identified on today's exam. Spleen has a normal appearance. No acute pancreatitis. Normal adrenal glands. No renal stones. Symmetric enhancement of the kidneys bilaterally. No CT evidence for pyelonephritis. No abdominal aortic aneurysm or dissection. Atherosclerotic vascular disease noted. . No small bowel obstruction or ileus. The cecum ascending colon and transverse colon are distended with gas, fluid and stool. Very large amount of stool in the rectum with rectum distended to 8.7 x 9 x 16.4 cm. Mild rectal wall thickening with perirectal inflammation reflective of a stercoral colitis. The urinary bladder and uterus are deviated ventrally secondary to the very large amount of stool in the rectum. Urinary bladder is decompressed with a Butler catheter.. Trace amount of free fluid in the deep pelvis. L4 compression  fracture with 1/3 loss of vertebral body height. Mild L5 compression fracture with 25% loss of vertebral body height. Hemangioma at L2. Bilateral L5 pars defects with grade 1 anterior subluxation. Degenerative disc and endplate disease at L4-5 and L5-S1. Mild bilateral hip arthritis.     Impression: 1. Very large amount of stool in the rectum with rectum distended to 8.7 x 9 x 16.4 cm. Mild rectal wall thickening with perirectal inflammation reflective of a stercoral colitis. The cecum ascending colon and transverse colon are distended with gas, fluid and stool. Moderate stool in the left colon. 2. NG tube tip in the stomach. Fluid in the distal esophagus may be reflective of gastroesophageal reflux. Stomach is distended with fluid. 3. Gallbladder not identified and is likely surgically absent. 4. Intra and extrahepatic biliary dilatation with no choledocholithiasis identified. Findings may be due to pancreatic head lesion, ampullary stricture/lesion, sphincter dysfunction or post cholecystectomy status. No pancreatic head lesion identified on today's exam. MR/MRCP could be considered for further evaluation. 5. Urinary bladder is decompressed with a Butler catheter.. 6. L4 and L5 compression fractures of indeterminate age. Please correlate with physical exam. Bilateral L5 pars defects. Degenerative disease of the lumbar spine. Authenticated and Electronically Signed by Sarah Peralta MD on 09/24/2024 09:29:37 PM    CT Angiogram Chest    Result Date: 9/24/2024  FINAL REPORT TECHNIQUE: null CLINICAL HISTORY: respiratory arrest COMPARISON: null FINDINGS: Exam: CTA Chest with IV contrast. Procedure: Coronal and sagittal MIP reformats were performed Comparison: None Clinical history: Cardiac arrest Findings: No pulmonary emboli. No thoracic aortic aneurysm or dissection. No hilar or mediastinal adenopathy. No pericardial fluid collection. No pleural fluid collection. Acute fractures of the left 5, 6 and 7 anterior ribs.  Acute fractures of the right anterior 5 and 6 ribs. No pneumothorax. Mild bibasilar atelectasis. The right upper arm is only partially visualized. Low-density ill-defined fluid and air seen in the soft tissues of the right upper arm. T3 compression fracture with 10% loss of vertebral body height. Age of the fracture is indeterminate. T12 compression fracture with 25% loss of vertebral body height. Age of the fracture is indeterminate.     Impression: 1. No pulmonary emboli. 2. No thoracic aortic aneurysm or dissection 3. Acute fractures of the left 5th-7th anterior ribs and right anterior 6th and 7th ribs. No pneumothorax. Mild bibasilar atelectasis. 4. The right upper arm is only partially visualized. Low-density ill-defined fluid and air seen in the soft tissues of the right upper arm. Please correlate clinically. 5. T3 and T12 compression fractures of indeterminate age. Please correlate with physical exam. MRI could be considered for further evaluation. Authenticated and Electronically Signed by Sarah Peralta MD on 09/24/2024 09:21:12 PM    CT Head Without Contrast    Result Date: 9/24/2024  FINAL REPORT TECHNIQUE: null CLINICAL HISTORY: post code COMPARISON: null FINDINGS: CT HEAD WITHOUT CONTRAST Comparison: None Findings: There is motion artifact. No acute intracranial hemorrhage, extra-axial fluid collection, hydrocephalus or midline shift. Age appropriate generalized parenchymal atrophy. There are periventricular and subcortical white matter hypodensities which are nonspecific but most likely related to microangiopathic gliosis. Intracranial arteriosclerosis. There is no sinus or mastoid fluid. Visualized orbits: No acute abnormalities. There is no acute fracture.     IMPRESSION: 1. No acute intracranial process. Authenticated and Electronically Signed by Becki Franklin DO on 09/24/2024 09:01:33 PM    XR Chest 1 View    Result Date: 9/24/2024  FINAL REPORT TECHNIQUE: null CLINICAL HISTORY: Severe Sepsis  triage protocol ET and central line placement COMPARISON: null FINDINGS: 1 view chest x-ray Comparison: None Findings: Endotracheal tube distal tip is positioned 2.5 cm superior to the claude. A left central line crosses midline with distal tip terminating at the level of the right atrium. A percutaneous pacer overlies the superior lateral right hemithorax. Linear scarring or atelectasis involving the left lung base. A large granuloma of the periphery of the mid left lung measures 1.4 cm. No focal consolidation or large pleural effusion. No pneumothorax. The heart size is normal. Retrocardiac atelectasis. No acute fractures. The stomach lumen is gas-filled and dilated.     IMPRESSION: 1. Endotracheal tube terminates 2.5 cm superior to the claude. Left central line crosses midline with distal tip overlying the expected location of the right atrium. 2. Likely scarring or atelectasis of the left lung base including the retrocardiac region without acute process. 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  Acute hypoxic and hypercapnic respiratory failure   Aspiration pneumonitis  Acute renal failure  Acute ischemic hepatitis  Stercoral colitis  Severe metabolic acidosis and lactic acidsos  Hypernatremia  Alzheimer's dementia  History of CVA  Impaired mobility and ADLs    Recommendations/Plan:  - CODE STATUS reviewed: FULL CODE   - Family have requested to defer GOC discussions until all her children are able to be present; will follow up with them tomorrow  - Neurology following, EEG noting no ongoing seizure activity  - Appears that at baseline patient with advanced dementia, family describing at least FAST 7a with PPS 40%  - Difficult to prognosticate, however patient remains critically ill with high risk for acute decompensation   - Palliative care will continue to  follow/support patient and family        I appreciate the opportunity to participate in the care of Ms. Annabel Hyman.  Please do not hesitate to contact me with any questions or concerns.      I spent 45 total minutes conducting chart review for relevant information, face to face assessment, counseling patient and/or family, and coordination of care.    Part of this note may be an electronic transcription/translation of spoken language to printed text using the Dragon Dictation System.       Kelsie Kang PA-C  09/26/24  09:47 EDT        Electronically signed by Linden Blount DO at 09/27/24 2146

## 2024-10-01 NOTE — PROGRESS NOTES
"  CC: Acute Respiratory Failure.  Status post cardiac arrest    S: Intubated.  No response noted to loud verbal stimulus.  Some thick secretions noted in the endotracheal tube.    ROS: Could not be obtained as the patient is on mechanical ventilator.    O:Vital signs reviewed.    /75 (BP Location: Left arm, Patient Position: Lying)   Pulse 97   Temp 98.1 °F (36.7 °C) (Oral)   Resp 18   Ht 139.7 cm (55\")   Wt 51 kg (112 lb 7 oz)   SpO2 96%   BMI 26.13 kg/m²     Temp (24hrs), Av.6 °F (36.4 °C), Min:97.2 °F (36.2 °C), Max:98.1 °F (36.7 °C)    Vent Day # 7  FiO2: 30-35 %.   PEEP: 5  Peak Pressure: 14    CVP Line. Day # 7.   Butler catheter present.     I & Os reviewed.   Intake/Output         24 0700 - 10/01/24 0659 10/01/24 0700 - 10/02/24 0659    Intake (ml) -- --    Output (ml) 845 50    Net (ml) -845 -50    Last Weight 51 kg (112 lb 7 oz) --            Net IO Since Admission: 8,491.83 mL [10/01/24 09]    General/Constitutional: Intubated.  Sedated  Eyes: PERRL. Eyes were closed  Neck:  Supple with out obvious JVD. No obvious masses noted.   Cardiovascular: S1 + S2.  Regular  Lungs/Respiratory: Transmitted Breath sounds noted.  No wheezing heard.  Minimal scattered crackles noted  GI/Abdomen: Soft. Bowel sounds positive.  No obvious organomegaly noted.  Musculoskeletal/Extremities: No edema noted. Gait could not be assessed at this time, as the patient was laying in bed.   Neurologic: Sedated. Intubated.       Labs: Reviewed.   Results from last 7 days   Lab Units 24  0401 24  0422 24  0716 24  0506 24  0412 24  1828   WBC 10*3/mm3 6.28 6.31 10.82* 15.26* 18.45* 12.31*   HEMOGLOBIN g/dL 8.7* 6.8* 7.2* 7.8* 9.5* 9.6*   HEMATOCRIT % 26.9* 21.5* 23.0* 24.7* 29.5* 33.3*   PLATELETS 10*3/mm3 169 148 176 243 302 250   NEUTROPHIL % %  --   --  79.9* 83.4*  --   --    NEUTROS ABS 10*3/mm3  --   --  8.65* 12.74*  --  4.19   EOSINOPHIL % %  --   --  0.5 0.2*  --   --  " "  EOS ABS 10*3/mm3  --   --  0.05 0.03  --  0.62*   LYMPHOCYTE % %  --   --  14.5* 11.1*  --   --    LYMPHS ABS 10*3/mm3  --   --  1.57 1.69  --   --        No results found for: \"PROCALCITO\"    Lab Results   Component Value Date    CRP 0.45 09/24/2024       No results found for: \"SEDRATE\"    No results found for: \"PROBNP\"    Results from last 7 days   Lab Units 09/29/24  0401 09/28/24  0422 09/27/24  0716 09/26/24  0506   SODIUM mmol/L 129* 130* 133* 145   POTASSIUM mmol/L 3.5 3.4* 3.3* 3.1*   CHLORIDE mmol/L 98 101 98 103   CO2 mmol/L 20.5* 22.4 24.1 30.3*   BUN mg/dL 12 16 23 30*   CREATININE mg/dL 0.80 0.97 1.16* 1.33*   CALCIUM mg/dL 7.3* 7.4* 7.7* 8.0*   ANION GAP mmol/L 10.5 6.6 10.9 11.7   BILIRUBIN mg/dL  --  0.2 0.3 0.4   ALK PHOS U/L  --  86 115 155*   ALT (SGPT) U/L  --  71* 94* 150*   AST (SGOT) U/L  --  132* 179* 202*   GLUCOSE mg/dL 182* 190* 158* 155*   TOTAL PROTEIN g/dL  --  4.7* 5.4* 5.9*   ALBUMIN g/dL  --  2.4* 2.6* 3.0*       Results from last 7 days   Lab Units 09/29/24  0401 09/25/24  0412 09/24/24  1828   MAGNESIUM mg/dL 1.7 3.0* 2.2   PHOSPHORUS mg/dL 1.7* 3.3 8.0*       No results found for: \"TSH\"    No results found for: \"FREET4\"    Results from last 7 days   Lab Units 09/24/24  1828   INR  1.32*       No results found for: \"CKTOTAL\"    No components found for: \"HSTROPT\"    No results found for: \"TROPONINT\"    No results found for: \"DDIMER\"    No results found for: \"LIPASE\"    Brief Urine Lab Results  (Last result in the past 365 days)        Color   Clarity   Blood   Leuk Est   Nitrite   Protein   CREAT   Urine HCG        09/24/24 2040 Yellow   Cloudy   Moderate (2+)   Small (1+)   Negative   100 mg/dL (2+)                     Micro: As of October 1, 2024   No results found for: \"RESPCX\"  No results found for: \"BCIDPCR\"  Lab Results   Component Value Date    BLOODCX No growth at 5 days 09/24/2024    BLOODCX No growth at 5 days 09/24/2024     Lab Results   Component Value Date    URINECX " "(A) 09/24/2024     >100,000 CFU/mL Klebsiella pneumoniae ssp pneumoniae     No results found for: \"MRSACX\"  Lab Results   Component Value Date    MRSAPCR No MRSA Detected 09/27/2024     No results found for: \"URCX\"  No components found for: \"LOWRESPCF\"  No results found for: \"THROATCX\"  No results found for: \"CULTURES\"  No components found for: \"STREPBCX\"  No results found for: \"STREPPNEUAG\"  No results found for: \"LEGIONELLA\"  No results found for: \"LEGANTIGENUR\"  No results found for: \"MYCOPLASCX\"  No results found for: \"GCCX\"  No results found for: \"WOUNDCX\"  No results found for: \"BODYFLDCX\"    No results found for: \"FLU\"    No results found for: \"ADENOVIRUS\"  No results found for: \"QP407F\"  No results found for: \"CVHKU1\"  No results found for: \"CVNL63\"  No results found for: \"CVOC43\"  No results found for: \"HUMETPNEVS\"  No results found for: \"HURVEV\"  No results found for: \"FLUBPCR\"  No results found for: \"PARAINFLUE\"  No results found for: \"PARAFLUV2\"  No results found for: \"PARAFLUV3\"  No results found for: \"PARAFLUV4\"  No results found for: \"BPERTPCR\"  No results found for: \"[id number]\"  No results found for: \"CPNEUPCR\"  No results found for: \"MPNEUMO\"  No results found for: \"FLUAPCR\"  No results found for: \"FLUAH3\"  No results found for: \"FLUAH1\"  No results found for: \"RSV\"  No results found for: \"BPARAPCR\"    COVID 19:  No results found for: \"COVID19\"        ABG:   Recent Labs     09/24/24  1949 09/26/24  0724 09/28/24  0746   PHART 7.362 7.516* 7.432   EUG0ECI 32.8* 39.3 36.8   PO2ART 452.0* 113.0* 109.0*   YPI9ZDC 18.6* 31.8* 24.5   BASEEXCESS -6.1* 8.2* 0.3       Lab Results   Component Value Date    LACTATE 3.1 (C) 09/25/2024    LACTATE 4.8 (C) 09/25/2024    LACTATE 8.9 (C) 09/24/2024         Echo:                Imaging: Latest imaging study was reviewed personally.    Imaging Results (Last 24 Hours)       ** No results found for the last 24 hours. **              Assessment & Recommendations/Plan: " "  1.  Acute Respiratory Failure  Not stable for extubation today, due to continued encephalopathy, likelihood of anoxic brain injury and worsened CT head findings.  Chest x-ray and ABG as clinically indicated.  Since the patient was on comfort measures, I would not suggest adding nebulized treatments.  I actually feel that the patient would need to be considered for terminal extubation to adequately institute comfort measures and this was discussed with the patient's family at the bedside    2.  Status post cardiac arrest  Unknown amount of time.     3.  Anoxic brain injury  Neurology following.  Latest CT of the head showed changes compatible with worsening cerebral edema compared to 2024.      4.  Metabolic acidosis/shock liver  Metabolic acidosis seems to have improved  Liver enzymes are slowly decreasing  Will continue clinical follow-up, as indicated    5.  Colitis/infection  On antibiotics per hospitalist service  Temp (24hrs), Av.6 °F (36.4 °C), Min:97.2 °F (36.2 °C), Max:98.1 °F (36.7 °C)  Temp (72hrs), Av.5 °F (36.9 °C), Min:96.1 °F (35.6 °C), Max:100 °F (37.8 °C)    No results found for: \"PROCALCITO\"  Lab Results   Component Value Date    WBC 6.28 2024    WBC 6.31 2024    WBC 10.82 (H) 2024     6.  Hematologic/anemia  Currently receiving PRBC.  Multifactorial.  We will continue to monitor closely  Lab Results   Component Value Date    HGB 8.7 (L) 2024    HGB 6.8 (C) 2024    HGB 7.2 (L) 2024     Lab Results   Component Value Date     2024     2024     2024     Lab Results   Component Value Date    INR 1.32 (H) 2024     7.  Renal/Fluid status/Electrolytes  Will follow hyponatremia as clinically indicated.  Not clinically significant at this time  Likely will have no bearing on clinical outcome, even if correction is attempted.  Lab Results   Component Value Date     (L) 2024     (L) 2024    " " (L) 09/27/2024    K 3.5 09/29/2024    K 3.4 (L) 09/28/2024    K 3.3 (L) 09/27/2024     Lab Results   Component Value Date    BUN 12 09/29/2024    BUN 16 09/28/2024    BUN 23 09/27/2024    CREATININE 0.80 09/29/2024    CREATININE 0.97 09/28/2024    CREATININE 1.16 (H) 09/27/2024     Net IO Since Admission: 8,491.83 mL [10/01/24 0923]  No results found for: \"PROBNP\"    8.  GI/nutrition  Nutrition per Dietician.   GI prophylaxis per admitting attending.    9.  DVT prophylaxis  Per admitting attending.    10.  Miscellaneous  Had a long discussion with the patient daughter and granddaughter at the bedside.  They are aware of the poor prognosis and are willing to consider terminal extubation but are waiting on one of the family members to make up their mind regarding going to proceed with that.    I told the patient's family members that CT findings and neurological examination/follow-up by neurology suggests probable anoxic brain injury  All the above features are suggestive of extremely poor prognosis and the chance of meaningful recovery is extremely low in this patient.  I also told the patient's family members that it goes against comfort that NG tube had been removed for that reason, but she still has an ET tube.  The patient's family seems to be mostly in agreement and will discuss this further with the family member who seems to be slightly unsure.  I told the patient's family members that the hospitalist are usually present 24/7 and unless they are extremely busy, it is possible for them to come up and explain the situation to the family member, whenever he is at the bedside.    The patient remains at significant risk for organ dysfunction and damage, requiring multiple measures to support and sustain organ function.  Condition remains critical.    Critical Care time spent in direct patient care: 32 minutes including high complexity decision making to assess, manipulate, and support vital organ system " failure in this individual who has impairment of one or more vital organ systems such that there is a high probability of imminent or life threatening deterioration in the patient’s condition.  This time includes multiple reassessments throughout the day, if needed and as appropriate.  This time excludes other billable procedures. Time does include preparation of documents, review of old records, coordinating care with other providers and direct bedside care.    I have discussed patient's overall clinical status with Pedro Altamirano,  especially with regards to my discussion with the family regarding overall very poor prognosis and some conflicting actions with regards to comfort care.  Dr. Altamirano mostly agrees and is willing to discuss this further with the family member, who is unsure, when he arrives at the bedside.    I also discussed with the nursing staff on multiple occasions, with regards to comfort care and other questions.    Plan was also discussed with nursing staff, as necessary.     This document was electronically signed by Vincent Castro MD on 10/01/24 at 09:23 EDT      Dictated utilizing Dragon dictation.

## 2024-10-01 NOTE — PROGRESS NOTES
"    [facility]     PALLIATIVE CARE FOLLOW UP NOTE    Name:  Annabel Hyman   Age:  84 y.o.  Sex:  female  :  1940  MRN:  [id number]   Visit Number:  [id number]  Date Of Service:  10/01/24  Primary Care Physician:  Provider, No Known    Chief Complaint: cardiac arrest and suspected anoxic encephalopathy    Interval History:  Patient seen and evaluated this morning, card discussed directly with primary attending and ICU nursing.  Patient continues under comfort measures, NG discontinued yesterday.  UOP significantly decreased, 645mL over the past 24 hours.  No sedation, remains intubated.  Met with family at bedside, who have come to the agreement to proceed with compassionate extubation this evening.        Review of Systems   Unable to perform ROS: Intubated          Pain Assessment  CPOT Facial Expression: 0-->relaxed, neutral  CPOT Body Movements: 0-->absence of movements  CPOT Muscle Tension: 0-->relaxed  Ventilator Compliance/Vocalization: 0-->tolerating ventilator or movement  CPOT Score: 0  Vitals: BP (!) 182/78   Pulse 101   Temp 98.2 °F (36.8 °C) (Axillary)   Resp 18   Ht 139.7 cm (55\")   Wt 51 kg (112 lb 7 oz)   SpO2 98%   BMI 26.13 kg/m²     Physical Exam  Vitals and nursing note reviewed.   Constitutional:       General: She is not in acute distress.     Appearance: She is ill-appearing. She is not diaphoretic.      Interventions: She is sedated and intubated.      Comments: Frail appearing, elderly female lying in bed   HENT:      Head: Normocephalic and atraumatic.      Nose:      Comments: NG noted     Mouth/Throat:      Comments: ETT noted  Cardiovascular:      Rate and Rhythm: Tachycardia present.   Pulmonary:      Effort: Pulmonary effort is normal. No respiratory distress. She is intubated.      Comments: MV  Musculoskeletal:      Cervical back: Neck supple.   Skin:     General: Skin is warm and dry.      Capillary Refill: Capillary refill takes less than 2 seconds. "   Neurological:      Comments: Unable to fully assess, occasionally will flutter eyes, no purposeful movements observed    Psychiatric:      Comments: Calm affect, does not appear agitated or restless          Results Reviewed:    Intake/Output Summary (Last 24 hours) at 10/1/2024 1629  Last data filed at 10/1/2024 0700  Gross per 24 hour   Intake --   Output 470 ml   Net -470 ml     Results from last 7 days   Lab Units 09/29/24  0401 09/28/24  0422   SODIUM mmol/L 129* 130*   POTASSIUM mmol/L 3.5 3.4*   CHLORIDE mmol/L 98 101   CO2 mmol/L 20.5* 22.4   BUN mg/dL 12 16   CREATININE mg/dL 0.80 0.97   CALCIUM mg/dL 7.3* 7.4*   BILIRUBIN mg/dL  --  0.2   ALK PHOS U/L  --  86   ALT (SGPT) U/L  --  71*   AST (SGOT) U/L  --  132*   GLUCOSE mg/dL 182* 190*     Results from last 7 days   Lab Units 09/29/24  0401   WBC 10*3/mm3 6.28   HEMOGLOBIN g/dL 8.7*   HEMATOCRIT % 26.9*   PLATELETS 10*3/mm3 169       Medication Review:   I have reviewed the patients active and prn medications.     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  Acute hypoxic and hypercapnic respiratory failure   Aspiration pneumonitis  Acute renal failure  Acute ischemic hepatitis  Stercoral colitis  Severe metabolic acidosis and lactic acidsos  Hypernatremia  Alzheimer's dementia  History of CVA  Impaired mobility and ADLs    Recommendations/Plan:  - GOC discussions yield desire to pursue comfort focused care, electing COMFORT MEASURES  - Anticipate compassionate extubation this evening, spoke with family directly regarding the procedure, including use of medications for symptom management  - Appears that at baseline patient with advanced dementia, family describing at least FAST 7a with PPS 40%  - Although no one person can predict when death will occur, prognosticate transitioning to EOL following compassionate extubation; spoke with family directly regarding this, who have expressed understanding  -  Patient likely qualifies for hospice services, however at the present too unstable for transport; defer discussions at this time  - Palliative care will continue to follow/support patient and family    Recommendations:  - Continue to encourage visitation at bedside, as compassionate extubation anticipated this evening  - Recommend increasing morphine to 2mg Q 15 minutes as needed for pain/dyspnea, following extubation  - Continue ativan as needed  - Utilize robinul prior to extubation to minimize secretions       CODE STATUS:   Code Status and Medical Interventions: No CPR (Do Not Attempt to Resuscitate); Comfort Measures   Ordered at: 09/29/24 0945     Level Of Support Discussed With:    Next of Kin (If No Surrogate)    Health Care Surrogate     Code Status (Patient has no pulse and is not breathing):    No CPR (Do Not Attempt to Resuscitate)     Medical Interventions (Patient has pulse or is breathing):    Comfort Measures         I spent 40 total minutes, including face to face assessment, record review, coordination of care with staff, and counseling patient and/or family  Part of this note may be an electronic transcription/translation of spoken language to printed text using the Dragon Dictation System.    Kelsie Kang PA-C  10/01/24  16:29 EDT

## 2024-10-01 NOTE — PLAN OF CARE
Goal Outcome Evaluation:  Plan of Care Reviewed With: patient        Progress: no change   Kelsie with palliative and Dr. Altamirano met with pt and family this morning. Family agreeable to terminal extubation, but are waiting for family to come and see her before doing so. Prn meds given for pain and excess secretions.

## 2024-10-01 NOTE — PROGRESS NOTES
"Dietitian Follow-up    Patient Name: Annabel Hyman  YOB: 1940  MRN: [id number]  Admission date: 9/24/2024    Comment:      Clinical Nutrition Follow-up   Encounter Information        Trending Narrative     10/1: Pt remains in the ICU intubated without sedation and is on comfort measures. The family has decided to hold off on tube feedings and NG was d/c.  mmHg. RD will remain available PRN if GOC change and artifical nutrition is needed.    9/30: Patient now comfort care - family requests to continue tube feeding. Patient on low dose TF currently running @ 20mL/hr x 22hrs. Per conversation w/ RN - patient not tolerating well. MAP 105mmHg. Phosphorus low.     9/28: MAP score improved to 77mmHg - and TF resumed to 20mL/hr per intake chart. Patient's family to meet with palliative today to discuss goals of care d/t recent CT results. Will continue to recommend patient remains on low dose tube feeds of 20mL/hr. Provider reports TF stopped at one point yesterday due to high residuals. RD to continue to follow-up and monitor.      9/27: Patient w/ 500mL NG-tube aspirate - will continue low dose TF. MAP score is 58mmHg - recommend holding TF regimen until MAP score is >65mmHg.      9/26: Patient remains NPO/intubated/sedated. RD consulted to provide enteral nutrition recommendations.      9/25: Patient with MST score of 2 - unsure of recent weight loss d/t chart review. Patient currently NPO w/ NG-tube - intubated and sedated. Propofol ordered 1.42-14.22mL/hr providing 38-375kcals/day. MAP is 121mmHg and lactate 3.1mmol/L. Will continue to follow-up and monitor.     Anthropometrics        Current Height, Weight Height: 139.7 cm (55\")  Weight: 51 kg (112 lb 7 oz) (10/01/24 0400)       Trending Weight Hx     This admission:              PTA:     Wt Readings from Last 30 Encounters:   10/01/24 0400 51 kg (112 lb 7 oz)   09/30/24 0500 51.4 kg (113 lb 5.1 oz)   09/29/24 0400 48.9 kg (107 lb 12.9 oz) "   09/28/24 0400 48.5 kg (106 lb 14.8 oz)   09/27/24 0400 47.6 kg (104 lb 15 oz)   09/26/24 0545 48.1 kg (106 lb 0.7 oz)   09/25/24 0600 47.4 kg (104 lb 8 oz)   09/24/24 2315 47.4 kg (104 lb 8 oz)   09/24/24 1808 49 kg (108 lb)      BMI kg/m2 Body mass index is 26.13 kg/m².     Labs        Pertinent Labs Results from last 7 days   Lab Units 09/29/24  0401 09/28/24  0422 09/27/24  0716 09/26/24  0506   SODIUM mmol/L 129* 130* 133* 145   POTASSIUM mmol/L 3.5 3.4* 3.3* 3.1*   CHLORIDE mmol/L 98 101 98 103   CO2 mmol/L 20.5* 22.4 24.1 30.3*   BUN mg/dL 12 16 23 30*   CREATININE mg/dL 0.80 0.97 1.16* 1.33*   CALCIUM mg/dL 7.3* 7.4* 7.7* 8.0*   BILIRUBIN mg/dL  --  0.2 0.3 0.4   ALK PHOS U/L  --  86 115 155*   ALT (SGPT) U/L  --  71* 94* 150*   AST (SGOT) U/L  --  132* 179* 202*   GLUCOSE mg/dL 182* 190* 158* 155*     Results from last 7 days   Lab Units 09/29/24  0401 09/26/24  0506 09/25/24  0412 09/24/24  1828   MAGNESIUM mg/dL 1.7  --  3.0* 2.2   PHOSPHORUS mg/dL 1.7*  --  3.3 8.0*   HEMOGLOBIN g/dL 8.7*   < > 9.5* 9.6*   HEMATOCRIT % 26.9*   < > 29.5* 33.3*    < > = values in this interval not displayed.         Medications    Scheduled Medications chlorhexidine, 15 mL, Mouth/Throat, Q12H  sodium chloride, 10 mL, Intravenous, Q12H        Infusions      PRN Medications   Glycopyrrolate PF    LORazepam    Morphine    sodium chloride     Physical Findings        Trending Physical   Appearance, NFPE    --  Edema  None noted     Bowel Function LBM: 9/30     Tubes CVC     Chewing/Swallowing NPO d/t intubated     Skin WNL     --  Current Nutrition Orders & Evaluation of Intake       Oral Nutrition     Food Allergies NKFA   Current PO Diet NPO Diet NPO Type: Strict NPO   Supplement    PO Evaluation     Trending % PO Intake      Nutrition Diagnosis         Nutrition Dx Problem 1 Inadequate oral intake r/t intubated AEB NPO status/pt is comfort measures.      Nutrition Dx Problem 2        Intervention Goal         Intervention  Goal(s) No significant wt changes  Provide nutrition if GOC change     Nutrition Intervention        RD Action No action      Nutrition Prescription          Diet Prescription NPO   Supplement Prescription    Enteral Nutrition Prescription     TPN Prescription       Monitor/Evaluation        Monitor Per protocol, I&O, PO intake, Pertinent labs, Weight, Skin status, GI status, Symptoms, POC/GOC, Swallow function, Hemodynamic stability     RD to f/up PRN    Electronically signed by:  Donna Reyes RD  10/01/24 10:00 EDT

## 2024-10-01 NOTE — PROGRESS NOTES
RT EQUIPMENT DEVICE RELATED - SKIN ASSESSMENT    Blake Score:  Blake Score: 8     RT Medical Equipment/Device:     ETT Gates/Anchorfast    Skin Assessment:      Cheek:  Intact    Device Skin Pressure Protection:  Positioning supports utilized    Nurse Notification:  Zhane Urias, CRT

## 2024-10-02 NOTE — PLAN OF CARE
Goal Outcome Evaluation:    Palliative care team continuing to follow and assist with comfort measures. Emotional support provided for patient and family, patient appears to be comfortable at this time. Comfort medication recommendations made as needed.

## 2024-10-02 NOTE — PROGRESS NOTES
[facility]    Name:  Annabel Hyman   Age:  84 y.o.  Sex:  female  :  1940  MRN:  [id number]   Visit Number:  [id number]  Admission Date:  2024  Date Of Service:  10/02/24  Primary Care Physician:  Provider, No Known     LOS: 8 days :    Chief Complaint:      Follow-up of cardiac arrest and suspected anoxic encephalopathy.    Subjective:    Terminal extubation performed overnight.  Family at bedside.  Continue with comfort measures.    Hospital Course:    Annabel Hyman is an 84-year-old female, chronically wheelchair-bound, history of CVA, hypertension, Alzheimer dementia was brought to the emergency room by EMS after cardiac arrest at home.  Patient apparently was sitting up and eating a cake while a strawberry got stuck in her throat.  She subsequently became unresponsive and turned blue.  Family started CPR and apparently took about 15 minutes for EMS arrival who continued the CPR with the Matthew device.  Family states that the patient did not turn cyanotic prior to EMS arrival.  Patient was transported to the ER with ongoing CPR and after she was in the ER and another dose of epinephrine, ROSC was obtained.  Patient was intubated and a left IJ central line was placed.  She was initiated on Levophed and was admitted to the ICU.    Initial vitals: Temperature 99.2, pulse 121, blood pressure 138/56 with subsequent drop to 83/49.  Pulse oxygen saturation 89% on arrival.  Initial ABG pH less than 6.7, pCO2 71, p.o. 213, bicarb 9.6 on 100% Ambu bag.  CMP showed a potassium of 5.3, BUN 28, creatinine 1.46 (unknown baseline), glucose 240, , .  Lactic acid was 14.  C-reactive protein 0.45.  INR 1.32.  WBC 12.3, hemoglobin 9.6.  Chest x-ray showed likely scarring or atelectasis of the left lung base.  Noncontrast CT of the head showed no acute intracranial process.  CT angiogram of the chest was negative for pulmonary embolism or aortic  aneurysm.  Showed acute fractures of the left 5-7th anterior ribs and right anterior sixth and seventh ribs.  No pneumothorax.  T3 and T12 compression fractures of indeterminate age noted.  CT of the abdomen and pelvis with contrast showed very large amount of stool in the rectum with mild rectal wall thickening.  Stomach is distended with fluid.  Gallbladder not identified.  Intra and extrahepatic biliary dilatation without choledocholithiasis noted.  L4 and L5 compression fractures of indeterminate age.  Patient was given IV Zosyn and was placed on Precedex, Levophed and was subsequently admitted to the medical ICU.    Patient was seen by Dr. Castro from pulmonology for ventilatory management.  She was also seen by Dr. Kiran for tonic-clonic seizures while on mechanical ventilation.  He recommended Keppra and fosphenytoin.  EEG was ordered.  Patient's blood pressure improved and Levophed was discontinued.  Due to high suspicion of anoxic encephalopathy, palliative care services were consulted.    Patient was continued on Zosyn for aspiration pneumonia.  She continued to have fevers despite being on antibiotics therapy and started dropping her blood pressures.  She was initiated on vancomycin as well as Levophed.  Repeat CT scan of the head without contrast done on 9/27/2024 unfortunately showed evidence of edema, more pronounced in the cerebellum and cerebrum compatible with anoxic brain injury.  After discussion with family and palliative care services, patient's CODE STATUS was changed to DNR.  Patient continued to be unresponsive and eventually developed pinpoint pupils that were not reactive likely from posterior brain edema and herniation.  After discussion with family, patient's CODE STATUS was changed to comfort measures.      Review of Systems:     All systems were reviewed and negative except as mentioned in subjective, assessment and plan.    Vital Signs:    Temp:  [97.3 °F (36.3 °C)-99 °F (37.2 °C)]  97.3 °F (36.3 °C)  Heart Rate:  [110-170] 128  Resp:  [30-34] 30  BP: (133-152)/(66-73) 152/73    Intake and output:    I/O last 3 completed shifts:  In: 0   Out: 1275 [Urine:1275]  I/O this shift:  In: -   Out: 150 [Urine:150]    Physical Examination:     General Appearance:  Unresponsive    Head:  Atraumatic and normocephalic.   Eyes: Conjunctivae and sclerae normal, no icterus.  Looks pale.  Pupils bilaterally pinpoint and nonreactive.     Throat: No oral lesions, no thrush, oral mucosa moist.     Neck: Supple, trachea midline, no thyromegaly.    Lungs:   Breath sounds heard bilaterally equally.  No wheezing or crackles.    Heart:  Normal S1 and S2, no murmur, No JVD.   Abdomen:   Normal bowel sounds, no masses, no organomegaly. Soft, nontender, nondistended, no rebound tenderness.  Butler catheter is in place.   Extremities: Supple, no edema, no cyanosis, no clubbing.  Poor muscle mass.   Skin: No bleeding or rash.   Neurologic: Unresponsive.     Laboratory results:    Results from last 7 days   Lab Units 09/29/24  0401 09/28/24  0422 09/27/24  0716 09/26/24  0506   SODIUM mmol/L 129* 130* 133* 145   POTASSIUM mmol/L 3.5 3.4* 3.3* 3.1*   CHLORIDE mmol/L 98 101 98 103   CO2 mmol/L 20.5* 22.4 24.1 30.3*   BUN mg/dL 12 16 23 30*   CREATININE mg/dL 0.80 0.97 1.16* 1.33*   CALCIUM mg/dL 7.3* 7.4* 7.7* 8.0*   BILIRUBIN mg/dL  --  0.2 0.3 0.4   ALK PHOS U/L  --  86 115 155*   ALT (SGPT) U/L  --  71* 94* 150*   AST (SGOT) U/L  --  132* 179* 202*   GLUCOSE mg/dL 182* 190* 158* 155*     Results from last 7 days   Lab Units 09/29/24  0401 09/28/24  0422 09/27/24  0716   WBC 10*3/mm3 6.28 6.31 10.82*   HEMOGLOBIN g/dL 8.7* 6.8* 7.2*   HEMATOCRIT % 26.9* 21.5* 23.0*   PLATELETS 10*3/mm3 169 148 176                 Results from last 7 days   Lab Units 09/28/24  0746   PH, ARTERIAL pH units 7.432   PO2 ART mm Hg 109.0*   PCO2, ARTERIAL mm Hg 36.8   HCO3 ART mmol/L 24.5     I have reviewed the patient's laboratory  results.    Radiology results:    No radiology results from the last 24 hrs  I have reviewed the patient's radiology reports.    Medication Review:     I have reviewed the patient's active and prn medications.     Problem List:      Cardiac arrest    ANDERS (acute kidney injury)    Acute respiratory failure with hypoxia and hypercapnia    Ischemic hepatitis    Stercoral colitis    Aspiration pneumonitis    Metabolic acidosis    Assessment:    Cardiac arrest at home with prolonged CPR, POA.  Severe anoxic encephalopathy with seizures, POA.  Acute hypoxic and hypercapnic respiratory failure likely secondary to #2.  Aspiration pneumonitis, POA.  Acute renal failure, POA.  Septic shock, not POA.  Acute ischemic hepatitis (shock liver), POA.  Constipation with stercoral colitis noted on CT scan.  Severe metabolic acidosis and lactic acidosis secondary to #1, POA.  Hypernatremia, POA.  History of stroke with functional quadriplegia.    Plan:    Anoxic encephalopathy.  - Patient does have severe anoxic encephalopathy with edema of the cerebellum and high risk for herniation.  She is already developing paralytic strabismus.  - Patient is currently having pinpoint pupils likely from worsening brain edema and possible herniation.  - Since the family has decided to make her comfort care we will discontinue all medications and keep her only on comfort measures.  - EEG showed moderate degree of diffuse cerebral dysfunction without any seizures a few days ago.  - Patient's prognosis is extremely poor and I have recommended terminal weaning.  Family is currently agreeable for comfort measures but did not want to make a decision regarding extubation.  - Repeat CT of the head shows worsening of cerebral edema.  - Continue morphine, Robinul and Ativan for comfort measures.    Cardiac arrest with prolonged CPR.  - Patient likely has suffered some amount of anoxic neurological injury and has had seizures and fevers which is not a good  prognostic indicator.  - Repeat CT of the head done on 9/21/2024 showed worsening of anoxic brain injury.  There is significant swelling in the cerebellum and she is at high risk for herniation.  - Exact etiology of cardiac arrest is uncertain but likely a respiratory event with aspiration pneumonitis, hypoxia leading to cardiac arrest.    Respiratory failure/aspiration pneumonia.  - Completed a course of antibiotics therapy.    Acute on chronic anemia.  - Status post 1 unit of blood transfusion.    Nutrition.  - Patient does have significant post residual volumes and we will discontinue tube feeds.  - Family is agreeable to discontinue NG tube.    I have discussed the patient's condition and treatment plan with her daughters and son who are at the bedside.  They are agreeable for comfort measures but somehow hesitant to make a decision to do compassionate extubation.    Discussed with nursing staff at the bedside.    I have reviewed the copied text and it is accurate as of 10/02/24    Patient's family at bedside. Compassionate terminal extubation performed 10/1.  Continue comfort measures and family support.     DVT Prophylaxis: Comfort measures.  Code Status: Comfort measures  Diet: N.p.o.  Discharge Plan: Terminal Extubation     Pedro Altamirano DO  10/02/24  16:05 EDT    Dictated utilizing Dragon dictation.

## 2024-10-02 NOTE — CASE MANAGEMENT/SOCIAL WORK
DCP; Pending course of recovery. Pt is on comfort care. She was compassionately extubated last night, once her brother from out of town arrived. Palliative team involved. No immediate needs. CM continues to follow.

## 2024-10-02 NOTE — PROGRESS NOTES
[facility]   FOLLOW UP NOTE    Name:  Annabel Hyman   Age:  84 y.o.  Sex:  female  :  1940  MRN:  [id number]   Visit Number:  [id number]  Admission Date:  2024  Date Of Service:  10/02/24  Primary Care Physician:  Provider, No Known    Patient was seen and examined. Pertinent laboratory and radiology data were reviewed.    Vital signs:    Vital Signs (last 24 hours)         10/01 0700  10/02 0659 10/02 0700  10/02 1904   Most Recent      Temp (°F)   98.2    97.3 -  100.8     100.8 (38.2) 10/02 1700    Heart Rate 90 -  170    120 -  137     134 10/02 1600    Resp 16 -  34      30     30 10/02 0742    /66 -  182/83      152/73     152/73 10/02 0742    SpO2 (%) 94 -  98    61 -  78     61 10/02 1600    Oxygen Concentration (%)   30       30 10/01 2000            Notified by nursing staff patient was unresponsive, appeared .  She had been under comfort measures since terminal extubation yesterday per chart review.  Upon my assessment, I did pronounce patient  at 1832 on 10/2/2024 with multiple family members at bedside.   available, prior given.  House supervisor notified.  Primary attending to do discharge summary.    General: Unresponsive  Cardiac: No heart sounds auscultated after 1 minute  Pulmonary: No breath sounds auscultated after 1 minute  Peripheral: No palpable carotid pulses  Neurological: Pupils fixed, nonreactive, no withdrawal to verbal/tactile/painful stimuli    Camila Arias DO  10/02/24  19:04 EDT    Dictated utilizing Dragon dictation.

## 2024-10-02 NOTE — NURSING NOTE
Patients daughter came up to this RN and stated they were ready for her mom to have her PRN medications and to take her ETT out. This RN called respiratory, House and physician to update. RT to bedside for extubation, PRN medications administered. Family is at bedside currently supporting patient and each other. No complaints at this time.

## 2024-10-02 NOTE — PLAN OF CARE
Goal Outcome Evaluation:     Pt  1832 per Dr. Arias                                         Problem: Skin Injury Risk Increased  Goal: Skin Health and Integrity  Outcome: Unable to Meet     Problem: Fall Injury Risk  Goal: Absence of Fall and Fall-Related Injury  Outcome: Unable to Meet     Problem: Adult Inpatient Plan of Care  Goal: Plan of Care Review  Outcome: Unable to Meet  Goal: Patient-Specific Goal (Individualized)  Outcome: Unable to Meet  Goal: Absence of Hospital-Acquired Illness or Injury  Outcome: Unable to Meet  Goal: Optimal Comfort and Wellbeing  Outcome: Unable to Meet  Goal: Readiness for Transition of Care  Outcome: Unable to Meet     Problem: Communication Impairment (Mechanical Ventilation, Invasive)  Goal: Effective Communication  Outcome: Unable to Meet     Problem: Device-Related Complication Risk (Mechanical Ventilation, Invasive)  Goal: Optimal Device Function  Outcome: Unable to Meet     Problem: Inability to Wean (Mechanical Ventilation, Invasive)  Goal: Mechanical Ventilation Liberation  Outcome: Unable to Meet     Problem: Nutrition Impairment (Mechanical Ventilation, Invasive)  Goal: Optimal Nutrition Delivery  Outcome: Unable to Meet     Problem: Skin and Tissue Injury (Mechanical Ventilation, Invasive)  Goal: Absence of Device-Related Skin and Tissue Injury  Outcome: Unable to Meet     Problem: Ventilator-Induced Lung Injury (Mechanical Ventilation, Invasive)  Goal: Absence of Ventilator-Induced Lung Injury  Outcome: Unable to Meet     Problem: Hypertension Comorbidity  Goal: Blood Pressure in Desired Range  Outcome: Unable to Meet     Problem: Noninvasive Ventilation Acute  Goal: Effective Unassisted Ventilation and Oxygenation  Outcome: Unable to Meet

## 2024-10-02 NOTE — PLAN OF CARE
Goal Outcome Evaluation:  Plan of Care Reviewed With: family, daughter        Progress: no change  Outcome Evaluation: Patient terminally extubated around 2200 this shift. Patient remains unresponsive. She is comfort care. PRNs administered per family request. She is on room air, hypoxic, sinus tachycardia. Family requests no more poking or unnecessary stimuli for patient comfort. No complaints at this time.

## 2024-10-03 NOTE — PAYOR COMM NOTE
"To:  [employer]  From: Gwendolyn Limon RN  Phone: [phone]  Fax: [phone]  NPI: [id number]  TIN: [id number]  Member ID: [id number]   MRN: [id number]    Flores Hyman (Dcsd. Female)       Date of Birth   1940    Social Security Number       Address   [address] [place] [address]    Home Phone   [phone]    MRN   [id number]       Adventism   Hoahaoism    Marital Status                               Admission Date   24    Admission Type   Emergency    Admitting Provider   Hugo Mccormick DO    Attending Provider       Department, Room/Bed   [facility] INTENSIVE CARE, I03/       Discharge Date   10/2/2024    Discharge Disposition       Discharge Destination                                 Attending Provider: (none)   Allergies: No Known Allergies    Isolation: None   Infection: None   Code Status: Prior    Ht: 139.7 cm (55\")   Wt: 51 kg (112 lb 7 oz)    Admission Cmt: None   Principal Problem: Cardiac arrest [I46.9]                   Active Insurance as of 2024       Primary Coverage       Payor Plan Insurance Group Employer/Plan Group    [employer] [place] [employer]        Payor Plan Address Payor Plan Phone Number Payor Plan Fax Number Effective Dates    [address] [address] [phone]  2024 - None Entered    [facility] [id number]         Subscriber Name Subscriber Birth Date Member ID       FLORES HYMAN 1940 [id number]                     Emergency Contacts        (Rel.) Home Phone Work Phone Mobile Phone    HARRY ASHBY (Daughter) [phone] -- [phone]    Javier Hyman (Relative) -- -- [phone]    Kanwal Hyman (Daughter) -- -- [phone]                 Physician Progress Notes (most recent note)        Camila Arias DO at 10/02/24 1904              [facility] HOSPITALIST   FOLLOW UP NOTE    Name:  Flores Hyman   Age:  84 y.o.  Sex:  female  :  1940  MRN:  [id number] "   Visit Number:  [id number]  Admission Date:  2024  Date Of Service:  10/02/24  Primary Care Physician:  Provider, No Known    Patient was seen and examined. Pertinent laboratory and radiology data were reviewed.    Vital signs:    Vital Signs (last 24 hours)         10/01 0700  10/02 0659 10/02 0700  10/02 190   Most Recent      Temp (°F)   98.2    97.3 -  100.8     100.8 (38.2) 10/02 1700    Heart Rate 90 -  170    120 -  137     134 10/02 1600    Resp 16 -  34      30     30 10/02 0742    /66 -  182/83      152/73     152/73 10/02 0742    SpO2 (%) 94 -  98    61 -  78     61 10/02 1600    Oxygen Concentration (%)   30       30 10/01 2000            Notified by nursing staff patient was unresponsive, appeared .  She had been under comfort measures since terminal extubation yesterday per chart review.  Upon my assessment, I did pronounce patient  at 1832 on 10/2/2024 with multiple family members at bedside.   available, prior given.  House supervisor notified.  Primary attending to do discharge summary.    General: Unresponsive  Cardiac: No heart sounds auscultated after 1 minute  Pulmonary: No breath sounds auscultated after 1 minute  Peripheral: No palpable carotid pulses  Neurological: Pupils fixed, nonreactive, no withdrawal to verbal/tactile/painful stimuli    Camila Arias DO  10/02/24  19:04 EDT    Dictated utilizing Dragon dictation.    Electronically signed by Camila Arias DO at 10/02/24 2777

## 2024-10-03 NOTE — NURSING NOTE
Patient picked up by [facility]  home with . House supervisor notified. All belongings returned to family except for a necklace and prayer card the family requested to be transported with patient.

## 2024-10-03 NOTE — CASE MANAGEMENT/SOCIAL WORK
Case Management Discharge Note      Final Note: Pt  10/2/24 at 18:32.         Selected Continued Care - Discharged on 10/2/2024 Admission date: 2024 - Discharge disposition:       Destination    No services have been selected for the patient.                Durable Medical Equipment    No services have been selected for the patient.                Dialysis/Infusion    No services have been selected for the patient.                Home Medical Care    No services have been selected for the patient.                Therapy    No services have been selected for the patient.                Community Resources    No services have been selected for the patient.                Community & DME    No services have been selected for the patient.                         Final Discharge Disposition Code: 20 -

## 2024-10-04 NOTE — PROGRESS NOTES
"Enter Query Response Below      Query Response:   Cardiac arrest due to acute respiratory failure with hypoxia and hypercapnia            If applicable, please update the problem list.   Patient: Annabel Hyman        : 1940  Account: [id number]           Admit Date: 2024        How to Respond to this query:       a. Click New Note     b. Answer query within the yellow box.                c. Update the Problem List, if applicable.    If you have any questions about this query contact me at: [email or web address]     Dr. Altamirano,     85 yo female admitted per H&P dated 24 for \"cardiac arrest due to airway obstruction, ANDERS, transaminitis, and bacterial colitis.\"  Also noted is \"ate a strawberry got stuck in the throat leading to cardiac arrest.\"  Risk Factors:  PMH includes prior CVA, HTN, and Alzheimer's dementia.  Clinical indicators: ED note \"Sounds to be pure respiratory arrest secondary to choking.\"  Progress notes -10/1/24 \"acute respiratory failure with hypoxia and hypercapnia. Exact etiology of cardiac arrest is uncertain but likely a respiratory event with aspiration pneumonitis, hypoxia leading to cardiac arrest.\"  Treatment: intubation and mechanical ventilation, IV Zosyn, Pulmonary and Neurology consulted. Transitioned to comfort measures.    Patient noted to have Cardiac arrest and choking/airway obstruction, acute respiratory failure with hypoxia and hypercapnia, and aspiration pneumonitis. Please further specify the cause for the patient’s cardiac arrest as:    Cardiac arrest due to choking/airway obstruction  Cardiac arrest due to acute respiratory failure with hypoxia and hypercapnia  Cardiac arrest due to aspiration pneumonitis  Other- specify__________    By submitting this query, we are merely seeking further clarification of documentation to accurately reflect all conditions that you are monitoring, evaluating, treating or that extend the hospitalization or utilize " additional resources of care. Please utilize your independent clinical judgment when addressing the question(s) above.     This query and your response, once completed, will be entered into the legal medical record.    Sincerely,  Ana DE LA CRUZ RN CCDS  System Director Clinical Documentation Integrity Program

## 2024-10-05 NOTE — DISCHARGE SUMMARY
[facility]   DEATH SUMMARY      Name:  Annabel Hyman   Age:  84 y.o.  Sex:  female  :  1940  MRN:  [id number]   Visit Number:  [id number]    Admission Date:  2024  Date and Time of Death:  Date and Time of Death: 10/2/2024 at 1832  Primary Care Physician:  Provider, No Known    Final Diagnoses:     Cardiac arrest at home with prolonged CPR, POA.  Severe anoxic encephalopathy with seizures, POA.  Acute hypoxic and hypercapnic respiratory failure likely secondary to #2.  Aspiration pneumonitis, POA.  Acute renal failure, POA.  Septic shock, not POA.  Acute ischemic hepatitis (shock liver), POA.  Constipation with stercoral colitis noted on CT scan.  Severe metabolic acidosis and lactic acidosis secondary to #1, POA.  Hypernatremia, POA.  History of stroke with functional quadriplegia.    Problem List:     Active Hospital Problems    Diagnosis  POA    **Cardiac arrest [I46.9]  Yes    Acute respiratory failure with hypoxia and hypercapnia [J96.01, J96.02]  Yes    Ischemic hepatitis [K72.00]  Yes    Stercoral colitis [K52.89]  Yes    Aspiration pneumonitis [J69.0]  Yes    Metabolic acidosis [E87.20]  Yes    ANDERS (acute kidney injury) [N17.9]  Yes      Resolved Hospital Problems   No resolved problems to display.     Presenting Problem:    Chief Complaint   Patient presents with    Cardiac Arrest      Consults:     Consulting Physician(s)         Provider   Role Specialty     Cheikh Kiran MD      Consulting Physician Neurology     Vincent Castro MD      Consulting Physician Pulmonary Disease          Procedures Performed:        History of presenting illness/Hospital Course:    Annabel Hyman is an 84-year-old female, chronically wheelchair-bound, history of CVA, hypertension, Alzheimer dementia was brought to the emergency room by EMS after cardiac arrest at home.  Patient apparently was sitting up and eating a cake while a strawberry got stuck in her throat.   She subsequently became unresponsive and turned blue.  Family started CPR and apparently took about 15 minutes for EMS arrival who continued the CPR with the Matthew device.  Family states that the patient did not turn cyanotic prior to EMS arrival.  Patient was transported to the ER with ongoing CPR and after she was in the ER and another dose of epinephrine, ROSC was obtained.  Patient was intubated and a left IJ central line was placed.  She was initiated on Levophed and was admitted to the ICU.     Initial vitals: Temperature 99.2, pulse 121, blood pressure 138/56 with subsequent drop to 83/49.  Pulse oxygen saturation 89% on arrival.  Initial ABG pH less than 6.7, pCO2 71, p.o. 213, bicarb 9.6 on 100% Ambu bag.  CMP showed a potassium of 5.3, BUN 28, creatinine 1.46 (unknown baseline), glucose 240, , .  Lactic acid was 14.  C-reactive protein 0.45.  INR 1.32.  WBC 12.3, hemoglobin 9.6.  Chest x-ray showed likely scarring or atelectasis of the left lung base.  Noncontrast CT of the head showed no acute intracranial process.  CT angiogram of the chest was negative for pulmonary embolism or aortic aneurysm.  Showed acute fractures of the left 5-7th anterior ribs and right anterior sixth and seventh ribs.  No pneumothorax.  T3 and T12 compression fractures of indeterminate age noted.  CT of the abdomen and pelvis with contrast showed very large amount of stool in the rectum with mild rectal wall thickening.  Stomach is distended with fluid.  Gallbladder not identified.  Intra and extrahepatic biliary dilatation without choledocholithiasis noted.  L4 and L5 compression fractures of indeterminate age.  Patient was given IV Zosyn and was placed on Precedex, Levophed and was subsequently admitted to the medical ICU.     Patient was seen by Dr. Castro from pulmonology for ventilatory management.  She was also seen by Dr. Kiran for tonic-clonic seizures while on mechanical ventilation.  He recommended  Keppra and fosphenytoin.  EEG was ordered.  Patient's blood pressure improved and Levophed was discontinued.  Due to high suspicion of anoxic encephalopathy, palliative care services were consulted.     Patient was continued on Zosyn for aspiration pneumonia.  She continued to have fevers despite being on antibiotics therapy and started dropping her blood pressures.  She was initiated on vancomycin as well as Levophed.  Repeat CT scan of the head without contrast done on 9/27/2024 unfortunately showed evidence of edema, more pronounced in the cerebellum and cerebrum compatible with anoxic brain injury.  After discussion with family and palliative care services, patient's CODE STATUS was changed to DNR.  Patient continued to be unresponsive and eventually developed pinpoint pupils that were not reactive likely from posterior brain edema and herniation.  After discussion with family, patient's CODE STATUS was changed to comfort measures.  Patient's family elected to pursue terminal extubation.       Physical Exam:    Please see death pronouncement note for further detail    Pertinent Lab Results:     Results from last 7 days   Lab Units 09/29/24  0401   SODIUM mmol/L 129*   POTASSIUM mmol/L 3.5   CHLORIDE mmol/L 98   CO2 mmol/L 20.5*   BUN mg/dL 12   CREATININE mg/dL 0.80   CALCIUM mg/dL 7.3*   GLUCOSE mg/dL 182*     Results from last 7 days   Lab Units 09/29/24  0401   WBC 10*3/mm3 6.28   HEMOGLOBIN g/dL 8.7*   HEMATOCRIT % 26.9*   PLATELETS 10*3/mm3 169                                   Pertinent Radiology Results:    Imaging Results (All)       Procedure Component Value Units Date/Time    CT Head Without Contrast [[id number]] Collected: 09/29/24 1023     Updated: 09/29/24 1027    Narrative:      PROCEDURE: CT HEAD WO CONTRAST-     HISTORY: To look for cerebral edema from anoxic brain injury;  R09.2-Respiratory arrest; T17.308A-Unspecified foreign body in larynx  causing other injury, initial encounter      COMPARISON: 2 days prior.     TECHNIQUE: Multiple axial CT images were performed from the foramen  magnum to the vertex. Individualized dose reduction techniques using  automated exposure control or adjustment of mA and/or kV according to  the patient size were employed.     FINDINGS: There is mild, age-appropriate, stable generalized cerebral  atrophy. The ventricles are enlarged. There is decreased attenuation in  a significant portion of the cerebellum with loss of some sulci  suggesting edema, similar to the prior exam. There is decreased  attenuation in the caudate nuclei bilaterally, particularly compared to  09/24/2024 as well as decreased attenuation in the external capsules  bilaterally. Lateral ventricles are mildly decreased in size,  particularly compared to 09/24/2024. There is some loss of cerebral  sulci compared to 09/27/2024. No masses are identified. No extra-axial  fluid is seen. The paranasal sinuses are unremarkable.       Impression:      Evidence of cerebral edema involving the cerebrum and the  cerebellum as described slightly worsened compared to 09/27/2024.              CTDI: 32.17 mGy  DLP:504.09 mGy.cm     This report was signed and finalized on 9/29/2024 10:25 AM by Ana Pacheco MD.       CT Head Without Contrast [[id number]] Collected: 09/27/24 1315     Updated: 09/27/24 1344    Narrative:      PROCEDURE: CT HEAD WO CONTRAST-     HISTORY: anoxic brain injury; R09.2-Respiratory arrest;  T17.308A-Unspecified foreign body in larynx causing other injury,  initial encounter     COMPARISON: September 24, 2024.     TECHNIQUE: Multiple axial CT images were performed from the foramen  magnum to the vertex without enhancement.     FINDINGS: There is decrease in size of sulci. There is mild dilatation  of the ventricles which has decreased. Gray-white matter differentiation  is still seen. Findings may represent mild edema with anoxic brain  injury in the differential diagnosis. There is fairly  diffuse decreased  attenuation in the cerebellum compatible with edema. Decreased sulci are  also seen in the cerebellum, more prominent than in cerebrum.       Impression:      Evidence of edema, more pronounced in the cerebellum than  cerebrum, compatible with anoxic brain injury.        DLP: 520.06 mGy.cm  CTDI: 28.77 mGy        This study was performed with techniques to keep radiation doses as low  as reasonably achievable (ALARA). Individualized dose reduction  techniques using automated exposure control or adjustment of mA and/or  kV according to the patient size were employed.              Images were reviewed, interpreted, and dictated by Dr. Ana Pacheco MD  Transcribed by Sarah Bruno PA-C.     This report was signed and finalized on 9/27/2024 1:42 PM by Ana Pacheco MD.       XR Chest 1 View [[id number]] Collected: 09/27/24 0734     Updated: 09/27/24 0738    Narrative:      PROCEDURE: XR CHEST 1 VW-     HISTORY: Resp Failure.; R09.2-Respiratory arrest; T17.308A-Unspecified  foreign body in larynx causing other injury, initial encounter     COMPARISON: 1 day prior.     FINDINGS: The heart is normal in size. The lungs are clear. The  mediastinum is unremarkable. There is no pneumothorax.  There are no  acute osseous abnormalities. There is evidence of old calcified  granulomatous disease.  ET tube present with the tip 2.4 cm above the claude. NG tube is stable  in position with the side port and tip in the fundus of the stomach.  Left IJ catheter is in stable position.       Impression:      Stable chest with stable position of support lines and  catheters..                 This report was signed and finalized on 9/27/2024 7:36 AM by Ana Pacheco MD.       XR Chest 1 View [[id number]] Collected: 09/26/24 0719     Updated: 09/26/24 0723    Narrative:       PROCEDURE: XR CHEST 1 VW-     HISTORY: Resp Failure.; R09.2-Respiratory arrest; T17.308A-Unspecified  foreign body in larynx causing other injury,  initial encounter     COMPARISON: 2 days prior.     FINDINGS: The heart is normal in size. The mediastinum is unremarkable.  Decreased inspiratory effort noted with crowding of the lung markings in  both lung bases. No area of consolidation is seen. There is mild  interstitial disease similar to prior. Left internal jugular catheter  and ET tube are in stable position. NG tube placed since the prior exam.  Tip and sideport are located in the fundus of the stomach. Previously  described gaseous distention of the stomach has resolved. There is no  pneumothorax.  There are no acute osseous abnormalities.       Impression:      Interval placement of NG tube with resolved gaseous  distention of the stomach..     Stable ET tube and left internal jugular catheter from prior..     No acute infiltrate seen.           This report was signed and finalized on 9/26/2024 7:21 AM by Ana Pacheco MD.       XR Abdomen KUB [[id number]] Collected: 09/25/24 1100     Updated: 09/25/24 1104    Narrative:      PROCEDURE: XR ABDOMEN KUB-     INDICATION:  NG tube insertion verification; R09.2-Respiratory arrest;  T17.308A-Unspecified foreign body in larynx causing other injury,  initial encounter     COMPARISON: 1 day prior.     FINDINGS:  A supine view of the abdomen was obtained.  The bowel gas  pattern is normal.  There are no pathologic calcifications.  No acute  osseous abnormality is identified. NG tube has been advanced since the  prior exam. Tip and the sideport are now located in the fundus of the  stomach. Previously described gaseous distention of the stomach has  resolved. Moderate amount of stool identified in the rectosigmoid, fecal  impaction not excluded.       Impression:      Interval advancement NG tube now in good position with  resolution of previous gaseous distention of the stomach..              This report was signed and finalized on 9/25/2024 11:02 AM by Ana Pacheco MD.       XR Abdomen KUB [[id number]] Collected:  09/25/24 0936     Updated: 09/25/24 0940    Narrative:      PROCEDURE: XR ABDOMEN KUB-     INDICATION:  NG placement; R09.2-Respiratory arrest;  T17.308A-Unspecified foreign body in larynx causing other injury,  initial encounter     COMPARISON:  None.     FINDINGS:  A supine view of the abdomen was obtained. There is gaseous  distention of the stomach as seen on CT earlier the same day. As seen on  prior CT there is a NG tube with the tip in the fundus of the stomach.  The sideport is located in the distal esophagus; recommend advancement  by 4 to 5 cm..  There are no pathologic calcifications.  No acute  osseous abnormality is identified. There is evidence of old calcified  granulomatous disease. Left internal jugular catheter identified with  tip in the right atrium.       Impression:      NG tube tip present in the fundus of the stomach with  sideport in the distal esophagus; recommend advancement of NG tube by 4  to 5 cm..     Partially visualized left internal jugular catheter.              This report was signed and finalized on 9/25/2024 9:38 AM by Ana Pacheco MD.       CT Abdomen Pelvis With Contrast [[id number]] Collected: 09/24/24 2129     Updated: 09/24/24 2130    Narrative:      FINAL REPORT    TECHNIQUE:  null    CLINICAL HISTORY:  cardiac arrest    COMPARISON:  null    FINDINGS:  Exam: CT Abdomen and Pelvis with contrast    Comparison: None    Clinical history: Cardiac arrest    Findings:    NG tube tip in the stomach. Fluid in the distal esophagus may be reflective of gastroesophageal reflux.    Stomach is distended with fluid.    Mild bibasilar atelectasis.    Liver does not demonstrate any focal lesions.    Gallbladder not identified and is likely surgically absent.    Enlargement of the Intrahepatic biliary ducts, common hepatic duct, common bile duct and pancreatic duct. No choledocholithiasis identified. Findings may be due to pancreatic head lesion, ampullary stricture/lesion, sphincter  dysfunction or post   cholecystectomy status. No pancreatic head lesion identified on today's exam.    Spleen has a normal appearance.    No acute pancreatitis.    Normal adrenal glands.    No renal stones. Symmetric enhancement of the kidneys bilaterally. No CT evidence for pyelonephritis.    No abdominal aortic aneurysm or dissection. Atherosclerotic vascular disease noted. .    No small bowel obstruction or ileus.    The cecum ascending colon and transverse colon are distended with gas, fluid and stool.    Very large amount of stool in the rectum with rectum distended to 8.7 x 9 x 16.4 cm. Mild rectal wall thickening with perirectal inflammation reflective of a stercoral colitis.    The urinary bladder and uterus are deviated ventrally secondary to the very large amount of stool in the rectum.    Urinary bladder is decompressed with a Butler catheter..    Trace amount of free fluid in the deep pelvis.    L4 compression fracture with 1/3 loss of vertebral body height.    Mild L5 compression fracture with 25% loss of vertebral body height.    Hemangioma at L2.    Bilateral L5 pars defects with grade 1 anterior subluxation. Degenerative disc and endplate disease at L4-5 and L5-S1.    Mild bilateral hip arthritis.      Impression:      Impression:    1. Very large amount of stool in the rectum with rectum distended to 8.7 x 9 x 16.4 cm. Mild rectal wall thickening with perirectal inflammation reflective of a stercoral colitis. The cecum ascending colon and transverse colon are distended with gas,   fluid and stool. Moderate stool in the left colon.    2. NG tube tip in the stomach. Fluid in the distal esophagus may be reflective of gastroesophageal reflux. Stomach is distended with fluid.    3. Gallbladder not identified and is likely surgically absent.    4. Intra and extrahepatic biliary dilatation with no choledocholithiasis identified. Findings may be due to pancreatic head lesion, ampullary stricture/lesion,  sphincter dysfunction or post cholecystectomy status. No pancreatic head lesion identified on   today's exam. MR/MRCP could be considered for further evaluation.    5. Urinary bladder is decompressed with a Butler catheter..    6. L4 and L5 compression fractures of indeterminate age. Please correlate with physical exam. Bilateral L5 pars defects. Degenerative disease of the lumbar spine.    Authenticated and Electronically Signed by Sarah Peralta MD  on 09/24/2024 09:29:37 PM    CT Angiogram Chest [[id number]] Collected: 09/24/24 2121     Updated: 09/24/24 2122    Narrative:      FINAL REPORT    TECHNIQUE:  null    CLINICAL HISTORY:  respiratory arrest    COMPARISON:  null    FINDINGS:  Exam: CTA Chest with IV contrast.    Procedure: Coronal and sagittal MIP reformats were performed    Comparison: None    Clinical history: Cardiac arrest    Findings:    No pulmonary emboli.    No thoracic aortic aneurysm or dissection.    No hilar or mediastinal adenopathy.    No pericardial fluid collection.    No pleural fluid collection.    Acute fractures of the left 5, 6 and 7 anterior ribs.    Acute fractures of the right anterior 5 and 6 ribs.    No pneumothorax.    Mild bibasilar atelectasis.    The right upper arm is only partially visualized. Low-density ill-defined fluid and air seen in the soft tissues of the right upper arm.    T3 compression fracture with 10% loss of vertebral body height. Age of the fracture is indeterminate.    T12 compression fracture with 25% loss of vertebral body height. Age of the fracture is indeterminate.      Impression:      Impression:    1. No pulmonary emboli.    2. No thoracic aortic aneurysm or dissection    3. Acute fractures of the left 5th-7th anterior ribs and right anterior 6th and 7th ribs. No pneumothorax. Mild bibasilar atelectasis.    4. The right upper arm is only partially visualized. Low-density ill-defined fluid and air seen in the soft tissues of the right upper arm.  Please correlate clinically.    5. T3 and T12 compression fractures of indeterminate age. Please correlate with physical exam. MRI could be considered for further evaluation.    Authenticated and Electronically Signed by Sarah Peralta MD  on 09/24/2024 09:21:12 PM    CT Head Without Contrast [[id number]] Collected: 09/24/24 2101     Updated: 09/24/24 2103    Narrative:      FINAL REPORT    TECHNIQUE:  null    CLINICAL HISTORY:  post code    COMPARISON:  null    FINDINGS:  CT HEAD WITHOUT CONTRAST    Comparison: None    Findings:    There is motion artifact.    No acute intracranial hemorrhage, extra-axial fluid collection, hydrocephalus or midline shift.    Age appropriate generalized parenchymal atrophy.    There are periventricular and subcortical white matter hypodensities which are nonspecific but most likely related to microangiopathic gliosis.    Intracranial arteriosclerosis.    There is no sinus or mastoid fluid.    Visualized orbits: No acute abnormalities.    There is no acute fracture.      Impression:      IMPRESSION:    1. No acute intracranial process.    Authenticated and Electronically Signed by Becki Franklin DO on  09/24/2024 09:01:33 PM    XR Chest 1 View [[id number]] Collected: 09/24/24 1913     Updated: 09/24/24 1915    Narrative:      FINAL REPORT    TECHNIQUE:  null    CLINICAL HISTORY:  Severe Sepsis triage protocol    ET and central line placement    COMPARISON:  null    FINDINGS:  1 view chest x-ray    Comparison: None    Findings:    Endotracheal tube distal tip is positioned 2.5 cm superior to the claude. A left central line crosses midline with distal tip terminating at the level of the right atrium. A percutaneous pacer overlies the superior lateral right hemithorax.    Linear scarring or atelectasis involving the left lung base. A large granuloma of the periphery of the mid left lung measures 1.4 cm. No focal consolidation or large pleural effusion. No pneumothorax. The heart size is  normal. Retrocardiac atelectasis.    No acute fractures. The stomach lumen is gas-filled and dilated.      Impression:      IMPRESSION:    1. Endotracheal tube terminates 2.5 cm superior to the claude. Left central line crosses midline with distal tip overlying the expected location of the right atrium.    2. Likely scarring or atelectasis of the left lung base including the retrocardiac region without acute process.    Authenticated and Electronically Signed by Anthony Egan DO on  2024 07:13:52 PM            Echo:        Code status during the hospital stay:    Code Status and Medical Interventions: No CPR (Do Not Attempt to Resuscitate); Comfort Measures   Ordered at: 24 0945     Level Of Support Discussed With:    Next of Kin (If No Surrogate)    Health Care Surrogate     Code Status (Patient has no pulse and is not breathing):    No CPR (Do Not Attempt to Resuscitate)     Medical Interventions (Patient has pulse or is breathing):    Comfort Measures       Discharge Disposition:        Test Results Pending at Discharge:           Pedro Altamirano DO  10/05/24  13:13 EDT    Time: I spent >30 minutes on this discharge activity which included: face-to-face encounter with the patient and/or family, reviewing the data in the system, coordination of the care with the nursing staff as well as consultants, documentation, and entering orders.     Dictated utilizing Dragon dictation.

## 2025-05-23 NOTE — PROGRESS NOTES
"    [facility]     PALLIATIVE CARE FOLLOW UP NOTE    Name:  Annabel Hyman   Age:  84 y.o.  Sex:  female  :  1940  MRN:  [id number]   Visit Number:  [id number]  Date Of Service:  10/02/24  Primary Care Physician:  Provider, No Known    Chief Complaint: cardiac arrest and suspected anoxic encephalopathy    Interval History:  Patient seen and evaluated this morning, care discussed directly with primary attending and ICU nursing.  Patient continues under comfort measures, compassionate extubation preformed last evening.   Per chart review, utilized morphine and ativan several times overnight.  UOP is minimal, 150mL documented.  Upon assessment, she appears tachyepnic, audible secretions.  She is unresponsive, does not appear agitated/restless.  Multiple family members at bedside, questions/concerns addressed.        Review of Systems   Unable to perform ROS: Patient unresponsive          Pain Assessment  CPOT Facial Expression: 0-->relaxed, neutral  CPOT Body Movements: 0-->absence of movements  CPOT Muscle Tension: 0-->relaxed  Ventilator Compliance/Vocalization: 0-->tolerating ventilator or movement  CPOT Score: 0  Vitals: /73 (BP Location: Left arm, Patient Position: Lying)   Pulse (!) 128   Temp 97.3 °F (36.3 °C) (Axillary)   Resp (!) 30   Ht 139.7 cm (55\")   Wt 51 kg (112 lb 7 oz)   SpO2 (!) 78%   BMI 26.13 kg/m²     Physical Exam  Vitals and nursing note reviewed.   Constitutional:       General: She is not in acute distress.     Appearance: She is ill-appearing. She is not diaphoretic.      Interventions: She is sedated and intubated.      Comments: Frail appearing, elderly female lying in bed   HENT:      Head: Normocephalic and atraumatic.   Cardiovascular:      Rate and Rhythm: Tachycardia present.   Pulmonary:      Effort: She is intubated.      Comments: Tachypneic  Musculoskeletal:      Cervical back: Neck supple.   Skin:     General: Skin is warm and dry.      " On supplemental vitamin-D - 5000 IU once weekly.  Check vitamin-D level   Capillary Refill: Capillary refill takes less than 2 seconds.      Coloration: Skin is pale.   Neurological:      Comments: Unresponsive   Psychiatric:      Comments: Calm affect, does not appear agitated or restless          Results Reviewed:    Intake/Output Summary (Last 24 hours) at 10/2/2024 1705  Last data filed at 10/2/2024 1100  Gross per 24 hour   Intake 0 ml   Output 1000 ml   Net -1000 ml     Results from last 7 days   Lab Units 09/29/24  0401 09/28/24  0422   SODIUM mmol/L 129* 130*   POTASSIUM mmol/L 3.5 3.4*   CHLORIDE mmol/L 98 101   CO2 mmol/L 20.5* 22.4   BUN mg/dL 12 16   CREATININE mg/dL 0.80 0.97   CALCIUM mg/dL 7.3* 7.4*   BILIRUBIN mg/dL  --  0.2   ALK PHOS U/L  --  86   ALT (SGPT) U/L  --  71*   AST (SGOT) U/L  --  132*   GLUCOSE mg/dL 182* 190*     Results from last 7 days   Lab Units 09/29/24  0401   WBC 10*3/mm3 6.28   HEMOGLOBIN g/dL 8.7*   HEMATOCRIT % 26.9*   PLATELETS 10*3/mm3 169       Medication Review:   I have reviewed the patients active and prn medications.     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  Acute hypoxic and hypercapnic respiratory failure   Aspiration pneumonitis  Acute renal failure  Acute ischemic hepatitis  Stercoral colitis  Severe metabolic acidosis and lactic acidsos  Hypernatremia  Alzheimer's dementia  History of CVA  Impaired mobility and ADLs    Recommendations/Plan:  - GOC discussions yield desire to pursue comfort focused care, electing COMFORT MEASURES  - Compassionate extubation 10/1  - Prognosticate likely hours; scant urine output, changes in breathing pattern, and mottling noted to BLE  - Appears that at baseline patient with advanced dementia, family describing at least FAST 7a with PPS 40%  - Patient likely qualifies for hospice services, however at the present too unstable for transport; defer discussions at this time  - Palliative care will continue to follow/support patient and  family    Recommendations:  - Morphine provided frequently, subsequently dose increased with minimal effect; discussed utilizing opiate drip to maximize control of dyspnea symptoms  - Continue to encourage visitation at bedside, death appearing eminent   - Continue ativan as needed  - Continue Robinul for secretions, scopolamine patch added     I returned to bedside along with ICU nursing staff this evening to provide ongoing education and support.  Patient appearing more comfortable in regards to air hunger.  I spoke with family at length regarding changes observed, noting death appearing eminent.  Continued emotional and spiritual support provided.  Family expressing all feel patient appears comfortable, pleased with care provided.  All questions/concerns addressed.         CODE STATUS:   Code Status and Medical Interventions: No CPR (Do Not Attempt to Resuscitate); Comfort Measures   Ordered at: 09/29/24 0945     Level Of Support Discussed With:    Next of Kin (If No Surrogate)    Health Care Surrogate     Code Status (Patient has no pulse and is not breathing):    No CPR (Do Not Attempt to Resuscitate)     Medical Interventions (Patient has pulse or is breathing):    Comfort Measures         I spent 65 total minutes, including face to face assessment, record review, coordination of care with staff, and counseling patient and/or family  Part of this note may be an electronic transcription/translation of spoken language to printed text using the Dragon Dictation System.    Kelsie Kang PA-C  10/02/24  17:05 EDT